# Patient Record
Sex: FEMALE | Race: WHITE | Employment: FULL TIME | URBAN - METROPOLITAN AREA
[De-identification: names, ages, dates, MRNs, and addresses within clinical notes are randomized per-mention and may not be internally consistent; named-entity substitution may affect disease eponyms.]

---

## 2017-01-13 ENCOUNTER — OFFICE VISIT (OUTPATIENT)
Dept: OBGYN CLINIC | Facility: CLINIC | Age: 32
End: 2017-01-13

## 2017-01-13 VITALS
SYSTOLIC BLOOD PRESSURE: 113 MMHG | BODY MASS INDEX: 41 KG/M2 | DIASTOLIC BLOOD PRESSURE: 79 MMHG | HEART RATE: 87 BPM | WEIGHT: 277 LBS

## 2017-01-13 DIAGNOSIS — K60.0 ACUTE ANAL FISSURE: Primary | ICD-10-CM

## 2017-01-13 PROCEDURE — 99213 OFFICE O/P EST LOW 20 MIN: CPT | Performed by: OBSTETRICS & GYNECOLOGY

## 2017-01-13 RX ORDER — CEPHALEXIN 500 MG/1
500 CAPSULE ORAL 4 TIMES DAILY
Qty: 28 CAPSULE | Refills: 0 | Status: SHIPPED | OUTPATIENT
Start: 2017-01-13 | End: 2017-06-02 | Stop reason: ALTCHOICE

## 2017-01-13 NOTE — PROGRESS NOTES
HPI:    Patient ID: Russel Paget is a 27year old female. HPI  Patient had a very hard BM about a week ago and perineal body has been sore and progressively getting worse. H/O Bartholin abscess but symptoms are not the same per patient.     Review of S Meds This Visit:  Signed Prescriptions Disp Refills    cephALEXin (KEFLEX) 500 MG Oral Cap 28 capsule 0      Sig: Take 1 capsule (500 mg total) by mouth 4 (four) times daily.            Imaging & Referrals:  None       CT#[id number]

## 2017-01-16 ENCOUNTER — TELEPHONE (OUTPATIENT)
Dept: OBGYN CLINIC | Facility: CLINIC | Age: 32
End: 2017-01-16

## 2017-01-16 NOTE — TELEPHONE ENCOUNTER
Pt states that she was given note from [facility] for her employer but she will like it updated to say that she can return to work on Wednesday 01/18/17. Please advise.

## 2017-01-20 ENCOUNTER — NURSE ONLY (OUTPATIENT)
Dept: OBGYN CLINIC | Facility: CLINIC | Age: 32
End: 2017-01-20

## 2017-01-20 VITALS — SYSTOLIC BLOOD PRESSURE: 117 MMHG | HEART RATE: 84 BPM | DIASTOLIC BLOOD PRESSURE: 74 MMHG

## 2017-01-20 DIAGNOSIS — Z30.42 ENCOUNTER FOR DEPO-PROVERA CONTRACEPTION: Primary | ICD-10-CM

## 2017-01-20 PROCEDURE — 96372 THER/PROPH/DIAG INJ SC/IM: CPT | Performed by: OBSTETRICS & GYNECOLOGY

## 2017-01-20 RX ORDER — MEDROXYPROGESTERONE ACETATE 150 MG/ML
150 INJECTION, SUSPENSION INTRAMUSCULAR ONCE
Status: COMPLETED | OUTPATIENT
Start: 2017-01-20 | End: 2017-01-20

## 2017-01-20 RX ADMIN — MEDROXYPROGESTERONE ACETATE 150 MG: 150 INJECTION, SUSPENSION INTRAMUSCULAR at 11:42:00

## 2017-04-07 ENCOUNTER — NURSE ONLY (OUTPATIENT)
Dept: OBGYN CLINIC | Facility: CLINIC | Age: 32
End: 2017-04-07

## 2017-04-07 VITALS — SYSTOLIC BLOOD PRESSURE: 100 MMHG | DIASTOLIC BLOOD PRESSURE: 60 MMHG

## 2017-04-07 DIAGNOSIS — Z30.42 ENCOUNTER FOR SURVEILLANCE OF INJECTABLE CONTRACEPTIVE: Primary | ICD-10-CM

## 2017-04-07 PROCEDURE — 81025 URINE PREGNANCY TEST: CPT | Performed by: OBSTETRICS & GYNECOLOGY

## 2017-04-07 PROCEDURE — 96372 THER/PROPH/DIAG INJ SC/IM: CPT | Performed by: OBSTETRICS & GYNECOLOGY

## 2017-04-07 RX ORDER — MEDROXYPROGESTERONE ACETATE 150 MG/ML
150 INJECTION, SUSPENSION INTRAMUSCULAR ONCE
Status: COMPLETED | OUTPATIENT
Start: 2017-04-07 | End: 2017-04-07

## 2017-04-07 RX ADMIN — MEDROXYPROGESTERONE ACETATE 150 MG: 150 INJECTION, SUSPENSION INTRAMUSCULAR at 10:44:00

## 2017-06-02 ENCOUNTER — OFFICE VISIT (OUTPATIENT)
Dept: INTERNAL MEDICINE CLINIC | Facility: CLINIC | Age: 32
End: 2017-06-02

## 2017-06-02 VITALS
TEMPERATURE: 99 F | BODY MASS INDEX: 42.06 KG/M2 | WEIGHT: 284 LBS | HEART RATE: 76 BPM | DIASTOLIC BLOOD PRESSURE: 78 MMHG | SYSTOLIC BLOOD PRESSURE: 118 MMHG | HEIGHT: 69 IN

## 2017-06-02 DIAGNOSIS — L02.416 CUTANEOUS ABSCESS OF LEFT LOWER EXTREMITY: Primary | ICD-10-CM

## 2017-06-02 PROBLEM — L02.91 ABSCESS OF SKIN: Status: ACTIVE | Noted: 2017-06-02

## 2017-06-02 PROCEDURE — 99212 OFFICE O/P EST SF 10 MIN: CPT | Performed by: INTERNAL MEDICINE

## 2017-06-02 PROCEDURE — 99213 OFFICE O/P EST LOW 20 MIN: CPT | Performed by: INTERNAL MEDICINE

## 2017-06-02 RX ORDER — SULFAMETHOXAZOLE AND TRIMETHOPRIM 800; 160 MG/1; MG/1
1 TABLET ORAL 2 TIMES DAILY
Qty: 14 TABLET | Refills: 0 | Status: SHIPPED | OUTPATIENT
Start: 2017-06-02 | End: 2017-06-09

## 2017-06-02 NOTE — PATIENT INSTRUCTIONS
Cutaneous abscess of left lower extremity  (primary encounter diagnosis) she is refusing incision, will start her on abx , warm compresses, if not better or getting worse call us      Abscess (Antibiotic Treatment Only)  An abscess (sometimes called a “boi · Fever of 100.4ºF (38ºC) or higher, or as directed by your healthcare provider  · Pus or fluid coming from the abscess  · Boil returns after getting better  Date Last Reviewed: 9/1/2016  © 1933-9478 The [facility] Jax Ray

## 2017-06-02 NOTE — PROGRESS NOTES
HPI:    Patient ID: Marj Kirk is a 27year old female. HPI She is here today complaining of boil on her left inner thigh. According to her she usually gets this between thighs all the time but this one is bigger, tender to touch and very painfull . Dicyclomine HCl 20 MG Oral Tab Take 1 tablet (20 mg total) by mouth 4 (four) times daily before meals and nightly.  Prn abdominal pain Disp: 120 tablet Rfl: 5   metoprolol Tartrate 25 MG Oral Tab Take 25 mg by mouth as needed (Patient to take if she is fe Tympanic membrane is not erythematous. Left Ear: Tympanic membrane and external ear normal. No drainage or tenderness. No mastoid tenderness. Nose: Nose normal. Right sinus exhibits no maxillary sinus tenderness and no frontal sinus tenderness.  Left si around   Psychiatric: She has a normal mood and affect. Her behavior is normal.   Vitals reviewed.            ASSESSMENT/PLAN:   Cutaneous abscess of left lower extremity  (primary encounter diagnosis) she is refusing incision, will start her on abx, and ta

## 2017-06-23 ENCOUNTER — NURSE ONLY (OUTPATIENT)
Dept: OBGYN CLINIC | Facility: CLINIC | Age: 32
End: 2017-06-23

## 2017-06-23 VITALS — SYSTOLIC BLOOD PRESSURE: 118 MMHG | DIASTOLIC BLOOD PRESSURE: 72 MMHG

## 2017-06-23 DIAGNOSIS — Z30.42 ENCOUNTER FOR DEPO-PROVERA CONTRACEPTION: Primary | ICD-10-CM

## 2017-06-23 PROCEDURE — 96372 THER/PROPH/DIAG INJ SC/IM: CPT | Performed by: OBSTETRICS & GYNECOLOGY

## 2017-06-23 RX ORDER — MEDROXYPROGESTERONE ACETATE 150 MG/ML
150 INJECTION, SUSPENSION INTRAMUSCULAR ONCE
Status: COMPLETED | OUTPATIENT
Start: 2017-06-23 | End: 2017-09-08

## 2017-08-11 ENCOUNTER — MYAURORA ACCOUNT LINK (OUTPATIENT)
Dept: OTHER | Age: 32
End: 2017-08-11

## 2017-08-11 ENCOUNTER — PRIOR ORIGINAL RECORDS (OUTPATIENT)
Dept: OTHER | Age: 32
End: 2017-08-11

## 2017-08-23 ENCOUNTER — CHARTING TRANS (OUTPATIENT)
Dept: OTHER | Age: 32
End: 2017-08-23

## 2017-09-08 ENCOUNTER — NURSE ONLY (OUTPATIENT)
Dept: OBGYN CLINIC | Facility: CLINIC | Age: 32
End: 2017-09-08

## 2017-09-08 VITALS — DIASTOLIC BLOOD PRESSURE: 70 MMHG | SYSTOLIC BLOOD PRESSURE: 104 MMHG

## 2017-09-08 DIAGNOSIS — Z30.013 INITIATION OF DEPO PROVERA: Primary | ICD-10-CM

## 2017-09-08 LAB
CONTROL LINE PRESENT WITH A CLEAR BACKGROUND (YES/NO): YES YES/NO
KIT LOT #: NORMAL NUMERIC

## 2017-09-08 PROCEDURE — 81025 URINE PREGNANCY TEST: CPT | Performed by: OBSTETRICS & GYNECOLOGY

## 2017-09-08 PROCEDURE — 96372 THER/PROPH/DIAG INJ SC/IM: CPT | Performed by: OBSTETRICS & GYNECOLOGY

## 2017-09-08 RX ADMIN — MEDROXYPROGESTERONE ACETATE 150 MG: 150 INJECTION, SUSPENSION INTRAMUSCULAR at 10:18:00

## 2017-12-01 ENCOUNTER — NURSE ONLY (OUTPATIENT)
Dept: OBGYN CLINIC | Facility: CLINIC | Age: 32
End: 2017-12-01

## 2017-12-01 DIAGNOSIS — Z30.42 ENCOUNTER FOR SURVEILLANCE OF INJECTABLE CONTRACEPTIVE: Primary | ICD-10-CM

## 2017-12-01 PROCEDURE — 96372 THER/PROPH/DIAG INJ SC/IM: CPT | Performed by: OBSTETRICS & GYNECOLOGY

## 2017-12-01 RX ORDER — MEDROXYPROGESTERONE ACETATE 150 MG/ML
150 INJECTION, SUSPENSION INTRAMUSCULAR ONCE
Status: COMPLETED | OUTPATIENT
Start: 2017-12-01 | End: 2017-12-01

## 2017-12-01 RX ADMIN — MEDROXYPROGESTERONE ACETATE 150 MG: 150 INJECTION, SUSPENSION INTRAMUSCULAR at 11:20:00

## 2018-01-11 ENCOUNTER — OFFICE VISIT (OUTPATIENT)
Dept: INTERNAL MEDICINE CLINIC | Facility: CLINIC | Age: 33
End: 2018-01-11

## 2018-01-11 VITALS
BODY MASS INDEX: 41.92 KG/M2 | HEIGHT: 69 IN | SYSTOLIC BLOOD PRESSURE: 122 MMHG | HEART RATE: 76 BPM | TEMPERATURE: 99 F | WEIGHT: 283 LBS | DIASTOLIC BLOOD PRESSURE: 80 MMHG

## 2018-01-11 DIAGNOSIS — R68.89 FLU-LIKE SYMPTOMS: Primary | ICD-10-CM

## 2018-01-11 LAB
FLUAV + FLUBV RNA SPEC NAA+PROBE: NEGATIVE

## 2018-01-11 PROCEDURE — 99212 OFFICE O/P EST SF 10 MIN: CPT | Performed by: INTERNAL MEDICINE

## 2018-01-11 PROCEDURE — 99213 OFFICE O/P EST LOW 20 MIN: CPT | Performed by: INTERNAL MEDICINE

## 2018-01-11 RX ORDER — IVERMECTIN 10 MG/G
CREAM TOPICAL
COMMUNITY
Start: 2017-12-29 | End: 2020-09-29 | Stop reason: ALTCHOICE

## 2018-01-12 NOTE — PROGRESS NOTES
HPI:    Patient ID: Terrence Lackey is a 34year old female. HPI  Patient here with complaint of some fatigue body aches scratchy throat employees at work sick with flu patient did not get flu shot.     Review of Systems   Constitutional: Positive for fati this encounter       Imaging & Referrals:  None       LY#[id number]

## 2018-02-16 ENCOUNTER — OFFICE VISIT (OUTPATIENT)
Dept: OBGYN CLINIC | Facility: CLINIC | Age: 33
End: 2018-02-16

## 2018-02-16 VITALS
HEIGHT: 69 IN | BODY MASS INDEX: 42.36 KG/M2 | DIASTOLIC BLOOD PRESSURE: 76 MMHG | SYSTOLIC BLOOD PRESSURE: 116 MMHG | WEIGHT: 286 LBS

## 2018-02-16 DIAGNOSIS — Z01.419 ENCOUNTER FOR GYNECOLOGICAL EXAMINATION WITHOUT ABNORMAL FINDING: ICD-10-CM

## 2018-02-16 DIAGNOSIS — Z30.42 DEPO-PROVERA CONTRACEPTIVE STATUS: Primary | ICD-10-CM

## 2018-02-16 LAB
CONTROL LINE PRESENT WITH A CLEAR BACKGROUND (YES/NO): YES YES/NO
KIT LOT #: NORMAL NUMERIC

## 2018-02-16 PROCEDURE — 99395 PREV VISIT EST AGE 18-39: CPT | Performed by: OBSTETRICS & GYNECOLOGY

## 2018-02-16 PROCEDURE — 81025 URINE PREGNANCY TEST: CPT | Performed by: OBSTETRICS & GYNECOLOGY

## 2018-02-16 PROCEDURE — 96372 THER/PROPH/DIAG INJ SC/IM: CPT | Performed by: OBSTETRICS & GYNECOLOGY

## 2018-02-16 RX ORDER — MEDROXYPROGESTERONE ACETATE 150 MG/ML
150 INJECTION, SUSPENSION INTRAMUSCULAR
Status: DISCONTINUED | OUTPATIENT
Start: 2018-02-16 | End: 2020-02-21

## 2018-02-16 RX ADMIN — MEDROXYPROGESTERONE ACETATE 150 MG: 150 INJECTION, SUSPENSION INTRAMUSCULAR at 15:45:00

## 2018-02-16 NOTE — PROGRESS NOTES
HPI:    Patient ID: Derek Caba is a 34year old female. HPI  Patient here for routine exam.  No C/Os. Pap due next year. Needs Depo-Provera today. Review of Systems   Constitutional: Negative. Respiratory: Negative.     Cardiovascular: Negativ ASSESSMENT/PLAN:   Depo-provera contraceptive status  (primary encounter diagnosis)  Encounter for gynecological examination without abnormal finding   F/U Labs. Encouraged monthly SBE. Discussed diet and exercise. Depo-Provera today.     Orders Placed

## 2018-04-21 ENCOUNTER — HOSPITAL ENCOUNTER (EMERGENCY)
Facility: HOSPITAL | Age: 33
Discharge: HOME | End: 2018-04-21
Attending: EMERGENCY MEDICINE
Payer: COMMERCIAL

## 2018-04-21 VITALS
HEART RATE: 82 BPM | SYSTOLIC BLOOD PRESSURE: 126 MMHG | OXYGEN SATURATION: 97 % | DIASTOLIC BLOOD PRESSURE: 78 MMHG | WEIGHT: 285 LBS | HEIGHT: 69 IN | TEMPERATURE: 99 F | BODY MASS INDEX: 42.21 KG/M2 | RESPIRATION RATE: 18 BRPM

## 2018-04-21 DIAGNOSIS — M54.40 BACK PAIN OF LUMBAR REGION WITH SCIATICA: Primary | ICD-10-CM

## 2018-04-21 PROCEDURE — 81025 URINE PREGNANCY TEST: CPT

## 2018-04-21 PROCEDURE — 96372 THER/PROPH/DIAG INJ SC/IM: CPT

## 2018-04-21 PROCEDURE — 99284 EMERGENCY DEPT VISIT MOD MDM: CPT

## 2018-04-21 RX ORDER — KETOROLAC TROMETHAMINE 30 MG/ML
30 INJECTION, SOLUTION INTRAMUSCULAR; INTRAVENOUS ONCE
Status: COMPLETED | OUTPATIENT
Start: 2018-04-21 | End: 2018-04-21

## 2018-04-21 RX ORDER — DIAZEPAM 5 MG/ML
5 INJECTION, SOLUTION INTRAMUSCULAR; INTRAVENOUS ONCE
Status: COMPLETED | OUTPATIENT
Start: 2018-04-21 | End: 2018-04-21

## 2018-04-21 RX ORDER — NAPROXEN 500 MG/1
500 TABLET ORAL 2 TIMES DAILY PRN
Qty: 20 TABLET | Refills: 0 | Status: SHIPPED | OUTPATIENT
Start: 2018-04-21 | End: 2018-04-28

## 2018-04-21 RX ORDER — HYDROCODONE BITARTRATE AND ACETAMINOPHEN 5; 325 MG/1; MG/1
1-2 TABLET ORAL EVERY 4 HOURS PRN
Qty: 10 TABLET | Refills: 0 | Status: SHIPPED | OUTPATIENT
Start: 2018-04-21 | End: 2018-04-28

## 2018-04-21 RX ORDER — DIAZEPAM 5 MG/1
5 TABLET ORAL 3 TIMES DAILY PRN
Qty: 20 TABLET | Refills: 0 | Status: SHIPPED | OUTPATIENT
Start: 2018-04-21 | End: 2018-04-28

## 2018-04-22 NOTE — ED NOTES
Patient reports 5/10 left lower back pain after moving heavy boxes yesterday. She states that she felt the pain immediately when lifting one box. Patient reports history of L5-S1 bulge. There are no other complaints.

## 2018-04-22 NOTE — ED INITIAL ASSESSMENT (HPI)
Pt c/o sharp and throbbing pain to lower back to radiates down right buttocks, started last night. Pt states she was carrying something heavy last night.

## 2018-05-07 ENCOUNTER — NURSE ONLY (OUTPATIENT)
Dept: OBGYN CLINIC | Facility: CLINIC | Age: 33
End: 2018-05-07

## 2018-05-07 VITALS — DIASTOLIC BLOOD PRESSURE: 82 MMHG | SYSTOLIC BLOOD PRESSURE: 126 MMHG

## 2018-05-07 DIAGNOSIS — Z30.9 ENCOUNTER FOR CONTRACEPTIVE MANAGEMENT, UNSPECIFIED TYPE: Primary | ICD-10-CM

## 2018-05-07 PROCEDURE — 96372 THER/PROPH/DIAG INJ SC/IM: CPT | Performed by: OBSTETRICS & GYNECOLOGY

## 2018-05-07 RX ADMIN — MEDROXYPROGESTERONE ACETATE 150 MG: 150 INJECTION, SUSPENSION INTRAMUSCULAR at 11:22:00

## 2018-07-27 ENCOUNTER — NURSE ONLY (OUTPATIENT)
Dept: OBGYN CLINIC | Facility: CLINIC | Age: 33
End: 2018-07-27
Payer: COMMERCIAL

## 2018-07-27 VITALS — SYSTOLIC BLOOD PRESSURE: 117 MMHG | DIASTOLIC BLOOD PRESSURE: 72 MMHG

## 2018-07-27 DIAGNOSIS — Z30.42 ENCOUNTER FOR SURVEILLANCE OF INJECTABLE CONTRACEPTIVE: Primary | ICD-10-CM

## 2018-07-27 LAB
CONTROL LINE PRESENT WITH A CLEAR BACKGROUND (YES/NO): YES YES/NO
KIT LOT #: NORMAL NUMERIC

## 2018-07-27 PROCEDURE — 81025 URINE PREGNANCY TEST: CPT | Performed by: OBSTETRICS & GYNECOLOGY

## 2018-07-27 PROCEDURE — 96372 THER/PROPH/DIAG INJ SC/IM: CPT | Performed by: OBSTETRICS & GYNECOLOGY

## 2018-07-27 RX ADMIN — MEDROXYPROGESTERONE ACETATE 150 MG: 150 INJECTION, SUSPENSION INTRAMUSCULAR at 11:16:00

## 2018-07-27 NOTE — PROGRESS NOTES
Pt reports to office for Depo-Provera 150 mg injection. Urine hcg negative. Pt tolerates injection well. Pt counseled to return to office 10/12-10/26 for next injection. Pt verbalizes understanding and agrees with plan.

## 2018-09-24 ENCOUNTER — HOSPITAL ENCOUNTER (OUTPATIENT)
Dept: GENERAL RADIOLOGY | Age: 33
Discharge: HOME | End: 2018-09-24
Attending: INTERNAL MEDICINE
Payer: COMMERCIAL

## 2018-09-24 ENCOUNTER — OFFICE VISIT (OUTPATIENT)
Dept: INTERNAL MEDICINE CLINIC | Facility: CLINIC | Age: 33
End: 2018-09-24
Payer: COMMERCIAL

## 2018-09-24 VITALS
TEMPERATURE: 99 F | DIASTOLIC BLOOD PRESSURE: 81 MMHG | RESPIRATION RATE: 18 BRPM | WEIGHT: 286 LBS | OXYGEN SATURATION: 97 % | BODY MASS INDEX: 42.36 KG/M2 | SYSTOLIC BLOOD PRESSURE: 114 MMHG | HEART RATE: 71 BPM | HEIGHT: 69 IN

## 2018-09-24 DIAGNOSIS — M25.561 CHRONIC PAIN OF RIGHT KNEE: ICD-10-CM

## 2018-09-24 DIAGNOSIS — Z00.00 ADULT GENERAL MEDICAL EXAM: ICD-10-CM

## 2018-09-24 DIAGNOSIS — G89.29 CHRONIC PAIN OF RIGHT KNEE: ICD-10-CM

## 2018-09-24 DIAGNOSIS — M54.32 SCIATICA OF LEFT SIDE: Primary | ICD-10-CM

## 2018-09-24 DIAGNOSIS — M54.32 SCIATICA OF LEFT SIDE: ICD-10-CM

## 2018-09-24 LAB
ALBUMIN SERPL BCP-MCNC: 4.2 G/DL (ref 3.5–4.8)
ALBUMIN/GLOB SERPL: 1.2 {RATIO} (ref 1–2)
ALP SERPL-CCNC: 47 U/L (ref 32–100)
ALT SERPL-CCNC: 25 U/L (ref 14–54)
ANION GAP SERPL CALC-SCNC: 10 MMOL/L (ref 0–18)
AST SERPL-CCNC: 26 U/L (ref 15–41)
BILIRUB SERPL-MCNC: 0.4 MG/DL (ref 0.3–1.2)
BUN SERPL-MCNC: 5 MG/DL (ref 8–20)
BUN/CREAT SERPL: 7.5 (ref 10–20)
CALCIUM SERPL-MCNC: 9.2 MG/DL (ref 8.5–10.5)
CHLORIDE SERPL-SCNC: 106 MMOL/L (ref 95–110)
CHOLEST SERPL-MCNC: 203 MG/DL (ref 110–200)
CO2 SERPL-SCNC: 22 MMOL/L (ref 22–32)
CREAT SERPL-MCNC: 0.67 MG/DL (ref 0.5–1.5)
GLOBULIN PLAS-MCNC: 3.6 G/DL (ref 2.5–3.7)
GLUCOSE SERPL-MCNC: 92 MG/DL (ref 70–99)
HDLC SERPL-MCNC: 47 MG/DL
LDLC SERPL CALC-MCNC: 122 MG/DL (ref 0–99)
NONHDLC SERPL-MCNC: 156 MG/DL
OSMOLALITY UR CALC.SUM OF ELEC: 283 MOSM/KG (ref 275–295)
PATIENT FASTING: NO
POTASSIUM SERPL-SCNC: 4.4 MMOL/L (ref 3.3–5.1)
PROT SERPL-MCNC: 7.8 G/DL (ref 5.9–8.4)
SODIUM SERPL-SCNC: 138 MMOL/L (ref 136–144)
TRIGL SERPL-MCNC: 169 MG/DL (ref 1–149)

## 2018-09-24 PROCEDURE — 96372 THER/PROPH/DIAG INJ SC/IM: CPT | Performed by: INTERNAL MEDICINE

## 2018-09-24 PROCEDURE — 72100 X-RAY EXAM L-S SPINE 2/3 VWS: CPT | Performed by: INTERNAL MEDICINE

## 2018-09-24 PROCEDURE — 73560 X-RAY EXAM OF KNEE 1 OR 2: CPT | Performed by: INTERNAL MEDICINE

## 2018-09-24 PROCEDURE — 99214 OFFICE O/P EST MOD 30 MIN: CPT | Performed by: INTERNAL MEDICINE

## 2018-09-24 PROCEDURE — 73502 X-RAY EXAM HIP UNI 2-3 VIEWS: CPT | Performed by: INTERNAL MEDICINE

## 2018-09-24 PROCEDURE — 36415 COLL VENOUS BLD VENIPUNCTURE: CPT | Performed by: INTERNAL MEDICINE

## 2018-09-24 RX ORDER — KETOROLAC TROMETHAMINE 10 MG/1
10 TABLET, FILM COATED ORAL EVERY 8 HOURS
Qty: 15 TABLET | Refills: 0 | Status: SHIPPED | OUTPATIENT
Start: 2018-09-24 | End: 2019-06-28

## 2018-09-24 RX ORDER — ACETAMINOPHEN 500 MG
500 TABLET ORAL EVERY 6 HOURS PRN
COMMUNITY

## 2018-09-24 RX ORDER — KETOROLAC TROMETHAMINE 30 MG/ML
30 INJECTION, SOLUTION INTRAMUSCULAR; INTRAVENOUS ONCE
Status: COMPLETED | OUTPATIENT
Start: 2018-09-24 | End: 2018-09-24

## 2018-09-24 RX ORDER — TIZANIDINE HYDROCHLORIDE 4 MG/1
4 CAPSULE, GELATIN COATED ORAL NIGHTLY
Qty: 5 CAPSULE | Refills: 0 | Status: SHIPPED | OUTPATIENT
Start: 2018-09-24 | End: 2019-06-28

## 2018-09-24 RX ADMIN — KETOROLAC TROMETHAMINE 30 MG: 30 INJECTION, SOLUTION INTRAMUSCULAR; INTRAVENOUS at 12:32:00

## 2018-09-24 NOTE — PATIENT INSTRUCTIONS
ASSESSMENT/PLAN:   Sciatica of left side  (primary encounter diagnosis) Careful with back. Correct lifting with legs and not with back. Turn body completely to lift something from side. Back exercises.  Correct posture when sitting with feet flat on floor a soft center may squeeze through and pinch a nerve. Pressure from bone  As a disk wears out, the vertebrae right above and below the disk start to touch. This can put pressure on a nerve.  Often, abnormal bone (called bone spurs) grows where the vertebrae seconds, then lower it back down. 3. Repeat 10 times, or as instructed. 4. Switch legs and repeat. Date Last Reviewed: 3/10/2016  © 5155-9344 The [employer]. 140[address] [facility], [address] [place] [address]. All rights reserved.  This information is (stenosis) of the openings between the vertebrae. The narrowed openings press on nerve roots as they leave the spinal canal.  · Unstable spine. This is when a vertebra slips forward. It can then press on a nerve root.   Other, less common things can put pre [place] , [place], [address] [place]. All rights reserved. This information is not intended as a substitute for professional medical care. Always follow your healthcare professional's instructions.         Self-Care for Low Back Pain    Most peopl medical care right away if:  · You can't stand or walk. · You have a temperature over 100.4°F (38.0°C)  · You have frequent, painful, or bloody urination. · You have severe abdominal pain. · You have a sharp, stabbing pain. · Your pain is constant.   · prevent a burn, keep a cloth between you and the heating pad. · Don’t use a heating pad for more than 15 minutes at a time. Never sleep on a heating pad. Date Last Reviewed: 9/1/2015  © 5614-0892 The [employer].  Alter Wall [address] push.  Date Last Reviewed: 3/1/2018  © 1702-5141 [employer]. 1407 [facility] [place], [address] [place]. All rights reserved. This information is not intended as a substitute for professional medical care.  Always follow your healthcare profes reserved. This information is not intended as a substitute for professional medical care. Always follow your healthcare professional's instructions.         Back Exercises: Abdominal Lift Brace with Marching    The abdominal lift brace with march strengthen your safety, check with your healthcare provider before starting an exercise program.   Date Last Reviewed: 3/1/2018  © 2644-6969 [employer]. 1407 [facility], [address] [place] [address]. All rights reserved.  This information is not intended as from a wall. Place one hand on it. Here are the other steps to the quad stretch:  · With your other hand, grasp your ankle on the same side. Pull the heel toward your buttocks. Don’t arch your back. · Hold for 30 to 60 seconds. Repeat 2 times.  Switch leg

## 2018-09-24 NOTE — PROGRESS NOTES
HPI:    Patient ID: Los Carrier is a 34year old female. H/O back trauma yrs. Ago and different spot helping pt. out of wheelchair. Pain meds. helped. Had PT X 2-3 months and L 5 bulging disc. Helped. Occ.  Recurs if overdo and ice and stretches and d Exercise level: somewhat active and has been following low salt diet. Weight has been stable.   Wt Readings from Last 3 Encounters:  09/24/18 : 286 lb (129.7 kg)  04/21/18 : 285 lb (129.3 kg)  02/16/18 : 286 lb (129.7 kg)    BP Readings from Last 3 Encou acetaminophen 500 MG Oral Tab Take 500 mg by mouth every 6 (six) hours as needed for Pain. Disp:  Rfl:    TiZANidine HCl 4 MG Oral Cap Take 1 capsule (4 mg total) by mouth nightly.  Disp: 5 capsule Rfl: 0   Ketorolac Tromethamine 10 MG Oral Tab Take 1 table Constitutional: She is oriented to person, place, and time. She appears well-developed and well-nourished. No distress. Neck: Trachea normal and phonation normal. No thyroid mass and no thyromegaly present.    Cardiovascular: Normal rate, regular rhythm, Left ankle: She exhibits normal range of motion, no swelling, no ecchymosis, no deformity, no laceration and normal pulse. No tenderness. Achilles tendon normal. Achilles tendon exhibits no pain, no defect and normal Robertson's test results.         Leighann Cantor • TiZANidine HCl 4 MG Oral Cap 5 capsule 0     Sig: Take 1 capsule (4 mg total) by mouth nightly. • Ketorolac Tromethamine 10 MG Oral Tab 15 tablet 0     Sig: Take 1 tablet (10 mg total) by mouth every 8 (eight) hours.        Imaging & Referrals:  XR KNEE

## 2018-09-25 PROBLEM — L02.91 ABSCESS OF SKIN: Status: RESOLVED | Noted: 2017-06-02 | Resolved: 2018-09-25

## 2018-10-12 ENCOUNTER — NURSE ONLY (OUTPATIENT)
Dept: OBGYN CLINIC | Facility: CLINIC | Age: 33
End: 2018-10-12
Payer: COMMERCIAL

## 2018-10-12 VITALS — DIASTOLIC BLOOD PRESSURE: 72 MMHG | SYSTOLIC BLOOD PRESSURE: 112 MMHG

## 2018-10-12 DIAGNOSIS — Z30.42 DEPO-PROVERA CONTRACEPTIVE STATUS: ICD-10-CM

## 2018-10-12 DIAGNOSIS — Z30.42 ENCOUNTER FOR SURVEILLANCE OF INJECTABLE CONTRACEPTIVE: Primary | ICD-10-CM

## 2018-10-12 PROCEDURE — 96372 THER/PROPH/DIAG INJ SC/IM: CPT | Performed by: OBSTETRICS & GYNECOLOGY

## 2018-10-12 RX ORDER — MEDROXYPROGESTERONE ACETATE 150 MG/ML
150 INJECTION, SUSPENSION INTRAMUSCULAR ONCE
Status: COMPLETED | OUTPATIENT
Start: 2018-10-12 | End: 2018-10-12

## 2018-10-12 RX ADMIN — MEDROXYPROGESTERONE ACETATE 150 MG: 150 INJECTION, SUSPENSION INTRAMUSCULAR at 10:15:00

## 2018-10-12 NOTE — PROGRESS NOTES
Pt here today for depo contraception injection. Pt tolerated inj well. No SE noted. Pt to come back between [id number]. Verbalized understanding.

## 2018-11-03 VITALS
BODY MASS INDEX: 39.99 KG/M2 | HEART RATE: 71 BPM | RESPIRATION RATE: 16 BRPM | HEIGHT: 69 IN | DIASTOLIC BLOOD PRESSURE: 72 MMHG | WEIGHT: 270 LBS | SYSTOLIC BLOOD PRESSURE: 110 MMHG | TEMPERATURE: 98.1 F

## 2019-01-02 ENCOUNTER — WALK IN (OUTPATIENT)
Dept: URGENT CARE | Age: 34
End: 2019-01-02

## 2019-01-02 VITALS
OXYGEN SATURATION: 96 % | HEIGHT: 69 IN | TEMPERATURE: 98.7 F | HEART RATE: 82 BPM | WEIGHT: 260 LBS | BODY MASS INDEX: 38.51 KG/M2 | SYSTOLIC BLOOD PRESSURE: 100 MMHG | DIASTOLIC BLOOD PRESSURE: 78 MMHG | RESPIRATION RATE: 16 BRPM

## 2019-01-02 DIAGNOSIS — J20.9 ACUTE BRONCHITIS, UNSPECIFIED ORGANISM: Primary | ICD-10-CM

## 2019-01-02 PROCEDURE — 99204 OFFICE O/P NEW MOD 45 MIN: CPT | Performed by: NURSE PRACTITIONER

## 2019-01-02 RX ORDER — ALBUTEROL SULFATE 90 UG/1
2 AEROSOL, METERED RESPIRATORY (INHALATION) EVERY 4 HOURS PRN
Qty: 1 INHALER | Refills: 0 | Status: SHIPPED | OUTPATIENT
Start: 2019-01-02 | End: 2019-01-09

## 2019-01-02 RX ORDER — BENZONATATE 100 MG/1
100 CAPSULE ORAL 3 TIMES DAILY PRN
Qty: 30 CAPSULE | Refills: 0 | Status: SHIPPED | OUTPATIENT
Start: 2019-01-02 | End: 2019-01-12

## 2019-01-02 ASSESSMENT — ENCOUNTER SYMPTOMS
SHORTNESS OF BREATH: 1
FEVER: 0
WHEEZING: 1
HEADACHES: 1
RHINORRHEA: 0
EYES NEGATIVE: 1
SORE THROAT: 0
ALLERGIC/IMMUNOLOGIC NEGATIVE: 1
COUGH: 1
GASTROINTESTINAL NEGATIVE: 1
ENDOCRINE NEGATIVE: 1
PSYCHIATRIC NEGATIVE: 1
HEMATOLOGIC/LYMPHATIC NEGATIVE: 1

## 2019-01-11 ENCOUNTER — NURSE ONLY (OUTPATIENT)
Dept: OBGYN CLINIC | Facility: CLINIC | Age: 34
End: 2019-01-11
Payer: COMMERCIAL

## 2019-01-11 VITALS — SYSTOLIC BLOOD PRESSURE: 120 MMHG | DIASTOLIC BLOOD PRESSURE: 82 MMHG

## 2019-01-11 DIAGNOSIS — Z30.42 ENCOUNTER FOR DEPO-PROVERA CONTRACEPTION: Primary | ICD-10-CM

## 2019-01-11 PROCEDURE — 96372 THER/PROPH/DIAG INJ SC/IM: CPT | Performed by: OBSTETRICS & GYNECOLOGY

## 2019-01-11 RX ADMIN — MEDROXYPROGESTERONE ACETATE 150 MG: 150 INJECTION, SUSPENSION INTRAMUSCULAR at 11:07:00

## 2019-02-28 VITALS
DIASTOLIC BLOOD PRESSURE: 60 MMHG | BODY MASS INDEX: 39.99 KG/M2 | HEIGHT: 69 IN | HEART RATE: 98 BPM | SYSTOLIC BLOOD PRESSURE: 100 MMHG | WEIGHT: 270 LBS

## 2019-04-08 ENCOUNTER — NURSE ONLY (OUTPATIENT)
Dept: OBGYN CLINIC | Facility: CLINIC | Age: 34
End: 2019-04-08
Payer: COMMERCIAL

## 2019-04-08 VITALS — SYSTOLIC BLOOD PRESSURE: 110 MMHG | DIASTOLIC BLOOD PRESSURE: 70 MMHG

## 2019-04-08 DIAGNOSIS — Z30.42 ENCOUNTER FOR SURVEILLANCE OF INJECTABLE CONTRACEPTIVE: Primary | ICD-10-CM

## 2019-04-08 PROCEDURE — 96372 THER/PROPH/DIAG INJ SC/IM: CPT | Performed by: OBSTETRICS & GYNECOLOGY

## 2019-04-08 RX ORDER — MEDROXYPROGESTERONE ACETATE 150 MG/ML
150 INJECTION, SUSPENSION INTRAMUSCULAR ONCE
Status: COMPLETED | OUTPATIENT
Start: 2019-04-08 | End: 2019-04-08

## 2019-04-08 RX ADMIN — MEDROXYPROGESTERONE ACETATE 150 MG: 150 INJECTION, SUSPENSION INTRAMUSCULAR at 11:38:00

## 2019-06-28 ENCOUNTER — OFFICE VISIT (OUTPATIENT)
Dept: OBGYN CLINIC | Facility: CLINIC | Age: 34
End: 2019-06-28
Payer: COMMERCIAL

## 2019-06-28 VITALS
WEIGHT: 282 LBS | BODY MASS INDEX: 42 KG/M2 | HEART RATE: 69 BPM | SYSTOLIC BLOOD PRESSURE: 111 MMHG | DIASTOLIC BLOOD PRESSURE: 74 MMHG

## 2019-06-28 DIAGNOSIS — Z01.419 WOMEN'S ANNUAL ROUTINE GYNECOLOGICAL EXAMINATION: Primary | ICD-10-CM

## 2019-06-28 PROCEDURE — 99395 PREV VISIT EST AGE 18-39: CPT | Performed by: OBSTETRICS & GYNECOLOGY

## 2019-06-28 PROCEDURE — 96372 THER/PROPH/DIAG INJ SC/IM: CPT | Performed by: OBSTETRICS & GYNECOLOGY

## 2019-06-28 RX ADMIN — MEDROXYPROGESTERONE ACETATE 150 MG: 150 INJECTION, SUSPENSION INTRAMUSCULAR at 11:33:00

## 2019-06-28 NOTE — PROGRESS NOTES
HPI:    Patient ID: Blu Kaba is a 28year old female. HPI  Well woman visit  New patient to me  For routine annual and to continue on her Depo-Provera. States that she has been on it for 13 years and has no issues or concerns.   Counseled on potent • Renal Disease Paternal Grandfather    • Diabetes Paternal Grandfather    • Hypertension Mother    • Cancer Maternal Grandfather         stomach   • Breast Cancer Maternal Aunt    • Colon Cancer Paternal Aunt       Social History: Social History    [facility] exam was performed with patient supine and chaperone present. External genitalia- normal.  Bartholin's and Washita's glands normal.  Urethral meatus- without lesions, mass, or discharge. Urethra- normal without lesion, cyst, mass, or tenderness.   Vulva- no

## 2019-09-13 ENCOUNTER — NURSE ONLY (OUTPATIENT)
Dept: OBGYN CLINIC | Facility: CLINIC | Age: 34
End: 2019-09-13
Payer: COMMERCIAL

## 2019-09-13 VITALS — SYSTOLIC BLOOD PRESSURE: 110 MMHG | DIASTOLIC BLOOD PRESSURE: 62 MMHG

## 2019-09-13 DIAGNOSIS — Z30.42 ENCOUNTER FOR SURVEILLANCE OF INJECTABLE CONTRACEPTIVE: Primary | ICD-10-CM

## 2019-09-13 PROCEDURE — 96372 THER/PROPH/DIAG INJ SC/IM: CPT | Performed by: OBSTETRICS & GYNECOLOGY

## 2019-09-13 RX ORDER — MEDROXYPROGESTERONE ACETATE 150 MG/ML
150 INJECTION, SUSPENSION INTRAMUSCULAR ONCE
Status: COMPLETED | OUTPATIENT
Start: 2019-09-13 | End: 2019-09-13

## 2019-09-13 RX ADMIN — MEDROXYPROGESTERONE ACETATE 150 MG: 150 INJECTION, SUSPENSION INTRAMUSCULAR at 11:35:00

## 2019-09-13 NOTE — PROGRESS NOTES
Patient had Depo provera on left Arm. Patient tolerated injection well. No adverse reaction. Patient was given dates when to come back for next depo. Patient verbally understood.

## 2019-09-18 ENCOUNTER — HOSPITAL ENCOUNTER (EMERGENCY)
Facility: HOSPITAL | Age: 34
Discharge: HOME | End: 2019-09-19
Attending: EMERGENCY MEDICINE
Payer: COMMERCIAL

## 2019-09-18 ENCOUNTER — APPOINTMENT (OUTPATIENT)
Dept: ULTRASOUND IMAGING | Facility: HOSPITAL | Age: 34
End: 2019-09-18
Attending: EMERGENCY MEDICINE
Payer: COMMERCIAL

## 2019-09-18 ENCOUNTER — APPOINTMENT (OUTPATIENT)
Dept: CT IMAGING | Facility: HOSPITAL | Age: 34
End: 2019-09-18
Attending: EMERGENCY MEDICINE
Payer: COMMERCIAL

## 2019-09-18 DIAGNOSIS — N83.202 CYST OF LEFT OVARY: Primary | ICD-10-CM

## 2019-09-18 DIAGNOSIS — K59.00 CONSTIPATION, UNSPECIFIED CONSTIPATION TYPE: ICD-10-CM

## 2019-09-18 LAB
ALBUMIN SERPL-MCNC: 4.1 G/DL (ref 3.4–5)
ALP LIVER SERPL-CCNC: 72 U/L (ref 37–98)
ALT SERPL-CCNC: 23 U/L (ref 13–56)
ANION GAP SERPL CALC-SCNC: 6 MMOL/L (ref 0–18)
AST SERPL-CCNC: 14 U/L (ref 15–37)
B-HCG UR QL: NEGATIVE
BASOPHILS # BLD AUTO: 0.04 X10(3) UL (ref 0–0.2)
BASOPHILS NFR BLD AUTO: 0.4 %
BILIRUB DIRECT SERPL-MCNC: 0.1 MG/DL (ref 0–0.2)
BILIRUB SERPL-MCNC: 0.3 MG/DL (ref 0.1–2)
BILIRUB UR QL: NEGATIVE
BUN BLD-MCNC: 10 MG/DL (ref 7–18)
BUN/CREAT SERPL: 13.3 (ref 10–20)
CALCIUM BLD-MCNC: 9 MG/DL (ref 8.5–10.1)
CHLORIDE SERPL-SCNC: 108 MMOL/L (ref 98–112)
CLARITY UR: CLEAR
CO2 SERPL-SCNC: 26 MMOL/L (ref 21–32)
COLOR UR: YELLOW
CREAT BLD-MCNC: 0.75 MG/DL (ref 0.55–1.02)
DEPRECATED RDW RBC AUTO: 47 FL (ref 35.1–46.3)
EOSINOPHIL # BLD AUTO: 0.11 X10(3) UL (ref 0–0.7)
EOSINOPHIL NFR BLD AUTO: 1 %
ERYTHROCYTE [DISTWIDTH] IN BLOOD BY AUTOMATED COUNT: 14.4 % (ref 11–15)
GLUCOSE BLD-MCNC: 97 MG/DL (ref 70–99)
GLUCOSE UR-MCNC: NEGATIVE MG/DL
HCT VFR BLD AUTO: 43.4 % (ref 35–48)
HGB BLD-MCNC: 13.8 G/DL (ref 12–16)
IMM GRANULOCYTES # BLD AUTO: 0.03 X10(3) UL (ref 0–1)
IMM GRANULOCYTES NFR BLD: 0.3 %
KETONES UR-MCNC: NEGATIVE MG/DL
LIPASE SERPL-CCNC: 101 U/L (ref 73–393)
LYMPHOCYTES # BLD AUTO: 2.87 X10(3) UL (ref 1–4)
LYMPHOCYTES NFR BLD AUTO: 27 %
M PROTEIN MFR SERPL ELPH: 8.5 G/DL (ref 6.4–8.2)
MCH RBC QN AUTO: 28.6 PG (ref 26–34)
MCHC RBC AUTO-ENTMCNC: 31.8 G/DL (ref 31–37)
MCV RBC AUTO: 90 FL (ref 80–100)
MONOCYTES # BLD AUTO: 0.8 X10(3) UL (ref 0.1–1)
MONOCYTES NFR BLD AUTO: 7.5 %
NEUTROPHILS # BLD AUTO: 6.77 X10 (3) UL (ref 1.5–7.7)
NEUTROPHILS # BLD AUTO: 6.77 X10(3) UL (ref 1.5–7.7)
NEUTROPHILS NFR BLD AUTO: 63.8 %
NITRITE UR QL STRIP.AUTO: NEGATIVE
OSMOLALITY SERPL CALC.SUM OF ELEC: 289 MOSM/KG (ref 275–295)
PH UR: 5 [PH] (ref 5–8)
PLATELET # BLD AUTO: 309 10(3)UL (ref 150–450)
POTASSIUM SERPL-SCNC: 3.9 MMOL/L (ref 3.5–5.1)
PROT UR-MCNC: NEGATIVE MG/DL
RBC # BLD AUTO: 4.82 X10(6)UL (ref 3.8–5.3)
RBC #/AREA URNS AUTO: 2 /HPF
SODIUM SERPL-SCNC: 140 MMOL/L (ref 136–145)
SP GR UR STRIP: 1.02 (ref 1–1.03)
UROBILINOGEN UR STRIP-ACNC: <2
WBC # BLD AUTO: 10.6 X10(3) UL (ref 4–11)
WBC #/AREA URNS AUTO: 3 /HPF

## 2019-09-18 PROCEDURE — 81025 URINE PREGNANCY TEST: CPT

## 2019-09-18 PROCEDURE — 80076 HEPATIC FUNCTION PANEL: CPT | Performed by: EMERGENCY MEDICINE

## 2019-09-18 PROCEDURE — 83690 ASSAY OF LIPASE: CPT | Performed by: EMERGENCY MEDICINE

## 2019-09-18 PROCEDURE — 81001 URINALYSIS AUTO W/SCOPE: CPT

## 2019-09-18 PROCEDURE — 76856 US EXAM PELVIC COMPLETE: CPT | Performed by: EMERGENCY MEDICINE

## 2019-09-18 PROCEDURE — 80048 BASIC METABOLIC PNL TOTAL CA: CPT | Performed by: EMERGENCY MEDICINE

## 2019-09-18 PROCEDURE — 81001 URINALYSIS AUTO W/SCOPE: CPT | Performed by: EMERGENCY MEDICINE

## 2019-09-18 PROCEDURE — 74177 CT ABD & PELVIS W/CONTRAST: CPT | Performed by: EMERGENCY MEDICINE

## 2019-09-18 PROCEDURE — 80048 BASIC METABOLIC PNL TOTAL CA: CPT

## 2019-09-18 PROCEDURE — 85025 COMPLETE CBC W/AUTO DIFF WBC: CPT

## 2019-09-18 PROCEDURE — 76830 TRANSVAGINAL US NON-OB: CPT | Performed by: EMERGENCY MEDICINE

## 2019-09-18 PROCEDURE — 85025 COMPLETE CBC W/AUTO DIFF WBC: CPT | Performed by: EMERGENCY MEDICINE

## 2019-09-18 PROCEDURE — 96374 THER/PROPH/DIAG INJ IV PUSH: CPT

## 2019-09-18 PROCEDURE — 99284 EMERGENCY DEPT VISIT MOD MDM: CPT

## 2019-09-18 PROCEDURE — 93975 VASCULAR STUDY: CPT | Performed by: EMERGENCY MEDICINE

## 2019-09-18 RX ORDER — MORPHINE SULFATE 4 MG/ML
4 INJECTION, SOLUTION INTRAMUSCULAR; INTRAVENOUS ONCE
Status: COMPLETED | OUTPATIENT
Start: 2019-09-18 | End: 2019-09-18

## 2019-09-19 ENCOUNTER — TELEPHONE (OUTPATIENT)
Dept: OBGYN CLINIC | Facility: CLINIC | Age: 34
End: 2019-09-19

## 2019-09-19 VITALS
WEIGHT: 270 LBS | HEIGHT: 69 IN | RESPIRATION RATE: 18 BRPM | HEART RATE: 68 BPM | SYSTOLIC BLOOD PRESSURE: 128 MMHG | TEMPERATURE: 98 F | BODY MASS INDEX: 39.99 KG/M2 | DIASTOLIC BLOOD PRESSURE: 70 MMHG | OXYGEN SATURATION: 98 %

## 2019-09-19 RX ORDER — POLYETHYLENE GLYCOL 3350 17 G/17G
17 POWDER, FOR SOLUTION ORAL DAILY PRN
Qty: 12 EACH | Refills: 0 | Status: SHIPPED | OUTPATIENT
Start: 2019-09-19 | End: 2019-10-19

## 2019-09-19 NOTE — TELEPHONE ENCOUNTER
RN routing to [facility] for consideration as pt last seen by him on 6/28/19 for annual .    Please advise.

## 2019-09-19 NOTE — TELEPHONE ENCOUNTER
Pt was seen in the ER for abdominal pain yesterday, was told she had ruptured cyst. Pt requesting a note to excuse her from work today and tomorrow.  Please advise

## 2019-09-19 NOTE — TELEPHONE ENCOUNTER
MD Yariel Gramajo Naima, RN; JASWINDER Doty [facility] Ob/Gyne Clinical Staff   Caller: Unspecified (Today,  9:19 AM)             Yes, she may have a note to take off work today. If her pain persists or gets more severe, she can come in to see me.

## 2019-09-19 NOTE — TELEPHONE ENCOUNTER
Letter drafted and sent to patient via [facility]. RN placed call to patient and advised that letter sent and patient can return call to office if pain persists or worsens. Pt verbalizes understanding and agrees with plan.

## 2019-09-19 NOTE — ED PROVIDER NOTES
Patient Seen in: [facility] AND [facility] Emergency Department      History   Patient presents with:  Abdomen/Flank Pain (GI/)    Stated Complaint: abdominal pain     HPI    14-year-old female with history of Bartholin cyst presenting for evaluation of lower 122.5 kg   SpO2 98%   BMI 39.87 kg/m²         Physical Exam   Constitutional: She appears well-developed and well-nourished. HENT:   Head: Normocephalic and atraumatic. Eyes: EOM are normal.   Neck: Normal range of motion.    Cardiovascular: Normal rate (er)    Result Date: 9/18/2019  CONCLUSION:  1. Moderate stool throughout the colon consistent with constipation/fecal retention. 2. Dominant left adnexal cyst measures 3.6 x 2.4 cm, presumed physiologic.   Pelvic ultrasound would be more sensitive in evalu the patient's pain. Recommended following up with gynecology to discuss possible treatments. In addition ED return precautions given regarding torsion. Patient very agreeable with this plan.         Disposition and Plan     Clinical Impression:  Cyst of

## 2019-10-03 ENCOUNTER — TELEPHONE (OUTPATIENT)
Dept: OBGYN CLINIC | Facility: CLINIC | Age: 34
End: 2019-10-03

## 2019-10-03 ENCOUNTER — LAB ENCOUNTER (OUTPATIENT)
Dept: LAB | Facility: HOSPITAL | Age: 34
End: 2019-10-03
Attending: OBSTETRICS & GYNECOLOGY
Payer: COMMERCIAL

## 2019-10-03 DIAGNOSIS — N92.6 IRREGULAR MENSES: Primary | ICD-10-CM

## 2019-10-03 DIAGNOSIS — N92.6 IRREGULAR MENSES: ICD-10-CM

## 2019-10-03 PROCEDURE — 85025 COMPLETE CBC W/AUTO DIFF WBC: CPT

## 2019-10-03 PROCEDURE — 36415 COLL VENOUS BLD VENIPUNCTURE: CPT

## 2019-10-03 NOTE — TELEPHONE ENCOUNTER
Pt had depo shot received 9/13. Pt is now getting her menses  Normal she don't . Pt also had ovarian cyst pop , and now has period again ,.  Has some questions ,

## 2019-10-03 NOTE — TELEPHONE ENCOUNTER
RN returned call to patient who endorses has been amenorrheic on depo but had menses on  8/30-9/6, had depo injection on 9/13. Was seen in ED on 9/18 for ruptured left ovarian cyst.  Has had bleeding since 9/27 that is period like, bright red to pink.   No

## 2019-10-03 NOTE — TELEPHONE ENCOUNTER
These episodes can be normal while on Depo-Provera but I recommend going to the lab for a CBC to see if her fatigue is due to anemia. Diagnosis can be irregular menses.

## 2019-11-29 ENCOUNTER — NURSE ONLY (OUTPATIENT)
Dept: OBGYN CLINIC | Facility: CLINIC | Age: 34
End: 2019-11-29
Payer: COMMERCIAL

## 2019-11-29 VITALS — DIASTOLIC BLOOD PRESSURE: 80 MMHG | SYSTOLIC BLOOD PRESSURE: 120 MMHG

## 2019-11-29 PROCEDURE — 96372 THER/PROPH/DIAG INJ SC/IM: CPT | Performed by: OBSTETRICS & GYNECOLOGY

## 2019-11-29 RX ADMIN — MEDROXYPROGESTERONE ACETATE 150 MG: 150 INJECTION, SUSPENSION INTRAMUSCULAR at 11:30:00

## 2019-11-29 NOTE — PROGRESS NOTES
Depo provera 150 mg IM administered to the pt in the right deltoid. Pt tolerated well. No adverse effects. Pt tolerated well.

## 2019-12-05 ENCOUNTER — WALK IN (OUTPATIENT)
Dept: URGENT CARE | Age: 34
End: 2019-12-05

## 2019-12-05 VITALS
HEIGHT: 69 IN | OXYGEN SATURATION: 96 % | SYSTOLIC BLOOD PRESSURE: 110 MMHG | TEMPERATURE: 99 F | DIASTOLIC BLOOD PRESSURE: 80 MMHG | HEART RATE: 89 BPM | WEIGHT: 270 LBS | BODY MASS INDEX: 39.99 KG/M2 | RESPIRATION RATE: 16 BRPM

## 2019-12-05 DIAGNOSIS — J02.0 STREP PHARYNGITIS: Primary | ICD-10-CM

## 2019-12-05 LAB
INTERNAL PROCEDURAL CONTROLS ACCEPTABLE: YES
S PYO AG THROAT QL IA.RAPID: POSITIVE

## 2019-12-05 PROCEDURE — 99214 OFFICE O/P EST MOD 30 MIN: CPT | Performed by: NURSE PRACTITIONER

## 2019-12-05 PROCEDURE — 87880 STREP A ASSAY W/OPTIC: CPT | Performed by: NURSE PRACTITIONER

## 2019-12-05 RX ORDER — MEDROXYPROGESTERONE ACETATE 150 MG/ML
INJECTION, SUSPENSION INTRAMUSCULAR
COMMUNITY

## 2019-12-05 RX ORDER — AMOXICILLIN 500 MG/1
500 CAPSULE ORAL 2 TIMES DAILY
Qty: 20 CAPSULE | Refills: 0 | Status: SHIPPED | OUTPATIENT
Start: 2019-12-05 | End: 2019-12-15

## 2019-12-05 ASSESSMENT — ENCOUNTER SYMPTOMS
HEMATOLOGIC/LYMPHATIC NEGATIVE: 1
FATIGUE: 1
EYES NEGATIVE: 1
HEADACHES: 1
SINUS PRESSURE: 1
FEVER: 1
COUGH: 1
PSYCHIATRIC NEGATIVE: 1
SORE THROAT: 1
SINUS PAIN: 1
CHILLS: 1
ALLERGIC/IMMUNOLOGIC NEGATIVE: 1
ENDOCRINE NEGATIVE: 1
GASTROINTESTINAL NEGATIVE: 1
SHORTNESS OF BREATH: 0

## 2019-12-17 NOTE — ED PROVIDER NOTES
Patient Seen in: [facility] AND [facility] Emergency Department    History   Patient presents with:  Back Pain (musculoskeletal)    Stated Complaint: lower back pain    HPI    30-year-old female with history of obesity and IBS presents with complaints of low radha Patient was seen on 11/20.  He was given Monodonox and tessalon perles on 11/20 and Levaquin on 12/3.  Reports last dose was last Thursday.   States that his symptoms are 80% better, but still having cough/congestion/wheezing.   Patient is only taking honey at this time, no other medications.     Patient is looking for inhaler to help with symptoms   Please advise on further recommendations or office visit is needed.  Thank you.      138/73  Pulse: 81  Resp: 20  Temp: 98.5 °F (36.9 °C)  Temp src: Temporal  SpO2: 96 %  O2 Device: None (Room air)    Current:/78   Pulse 82   Temp 98.5 °F (36.9 °C) (Temporal)   Resp 18   Ht 175.3 cm (5' 9\")   Wt 129.3 kg   SpO2 97%   BMI 42.09 kg/m² (two) times daily as needed. Qty: 20 tablet Refills: 0    diazepam 5 MG Oral Tab  Take 1 tablet (5 mg total) by mouth 3 (three) times daily as needed (pain/spasm).   Qty: 20 tablet Refills: 0    HYDROcodone-acetaminophen 5-325 MG Oral Tab  Take 1-2 tablets

## 2019-12-23 NOTE — TELEPHONE ENCOUNTER
"Anesthesia Transfer of Care Note    Patient: Ede Parham    Procedure(s) Performed: Procedure(s) (LRB):  COLONOSCOPY/EMR (N/A)    Patient location: [facility]    Anesthesia Type: general    Transport from OR: Transported from OR on 2-3 L/min O2 by NC with adequate spontaneous ventilation    Post pain: adequate analgesia    Post assessment: no apparent anesthetic complications and tolerated procedure well    Post vital signs: stable    Level of consciousness: awake, alert and oriented    Nausea/Vomiting: no nausea/vomiting    Complications: none    Transfer of care protocol was followed      Last vitals:   Visit Vitals  /84   Pulse 89   Temp 36.8 °C (98.2 °F) (Temporal)   Resp 18   Ht 5' 7" (1.702 m)   Wt 74.8 kg (165 lb)   SpO2 99%   BMI 25.84 kg/m²     " Need you re approval

## 2020-02-21 ENCOUNTER — NURSE ONLY (OUTPATIENT)
Dept: OBGYN CLINIC | Facility: CLINIC | Age: 35
End: 2020-02-21
Payer: COMMERCIAL

## 2020-02-21 VITALS — DIASTOLIC BLOOD PRESSURE: 68 MMHG | SYSTOLIC BLOOD PRESSURE: 116 MMHG

## 2020-02-21 DIAGNOSIS — Z30.42 ENCOUNTER FOR SURVEILLANCE OF INJECTABLE CONTRACEPTIVE: Primary | ICD-10-CM

## 2020-02-21 PROCEDURE — 96372 THER/PROPH/DIAG INJ SC/IM: CPT | Performed by: OBSTETRICS & GYNECOLOGY

## 2020-02-21 RX ORDER — MEDROXYPROGESTERONE ACETATE 150 MG/ML
150 INJECTION, SUSPENSION INTRAMUSCULAR
Status: SHIPPED | OUTPATIENT
Start: 2020-02-21 | End: 2020-11-17

## 2020-02-21 RX ADMIN — MEDROXYPROGESTERONE ACETATE 150 MG: 150 INJECTION, SUSPENSION INTRAMUSCULAR at 11:32:00

## 2020-02-21 NOTE — PROGRESS NOTES
Pt of   here for on-time Depo 150 injection. Vitals WNL. Injection well tolerated. No adverse reaction noted.       Last annual: 06/28/2019  Last depo injection: 11/29/19      Pt advised to return for next injection: May 9- May 23

## 2020-03-18 ENCOUNTER — NURSE TRIAGE (OUTPATIENT)
Dept: INTERNAL MEDICINE CLINIC | Facility: CLINIC | Age: 35
End: 2020-03-18

## 2020-03-18 DIAGNOSIS — R06.7 SNEEZE: ICD-10-CM

## 2020-03-18 PROCEDURE — 99442 PHONE E/M BY PHYS 11-20 MIN: CPT | Performed by: INTERNAL MEDICINE

## 2020-03-18 NOTE — TELEPHONE ENCOUNTER
Virtual/Telephone Check-In    Kitty Hansel verbally consents to a Virtual/Telephone Check-In service on 03/18/20. Patient understands and accepts financial responsibility for any deductible, co-insurance and/or co-pays associated with this service.     Du

## 2020-03-18 NOTE — TELEPHONE ENCOUNTER
Action Requested: Summary for Provider     []  Critical Lab, Recommendations Needed  [] Need Additional Advice  []   FYI    []   Need Orders  [] Need Medications Sent to Pharmacy  []  Other     SUMMARY: Pt asking to be tested for COVID-19 was sent home fro

## 2020-03-20 RX ORDER — OSELTAMIVIR PHOSPHATE 75 MG/1
75 CAPSULE ORAL 2 TIMES DAILY
Qty: 10 CAPSULE | Refills: 0 | Status: SHIPPED | OUTPATIENT
Start: 2020-03-20 | End: 2020-03-25

## 2020-03-20 NOTE — TELEPHONE ENCOUNTER
Patient called to let Dr. Guillermina Johnson know that her dad had a respiratory swab recently and was told he has influenza A and not COVID-19. Patient states her father's physician recommended  she contact her PCP to get a script for Tamiflu.      Dr. Sarmiento Slider:   Berto Bland

## 2020-05-14 ENCOUNTER — TELEPHONE (OUTPATIENT)
Dept: OBGYN CLINIC | Facility: CLINIC | Age: 35
End: 2020-05-14

## 2020-05-23 ENCOUNTER — PATIENT MESSAGE (OUTPATIENT)
Dept: INTERNAL MEDICINE CLINIC | Facility: CLINIC | Age: 35
End: 2020-05-23

## 2020-05-24 ENCOUNTER — TELEPHONE (OUTPATIENT)
Dept: INTERNAL MEDICINE CLINIC | Facility: CLINIC | Age: 35
End: 2020-05-24

## 2020-05-25 NOTE — TELEPHONE ENCOUNTER
From: Mecca Munroe  To: Boone Figueroa. Andres Calderon MD  Sent: 5/23/2020 5:50 PM CDT  Subject: Other    Hello Dr Evelyn Elizondo been having plantar fasciitis pain like symptoms on my right foot since November.  The pain was manageable but these past two weeks have been

## 2020-05-25 NOTE — TELEPHONE ENCOUNTER
Couple of options for patient  1 to see podiatry  2 continue with rest, ice, stretching and take anti-inflammatories like Motrin 400 mg 2 times a day with meals.     Please let patient know    If there is no improvement, please have patient contact Dr. Babs Vieira

## 2020-05-26 NOTE — TELEPHONE ENCOUNTER
Pt has viewed my chart message    From  Nick Wilson RN To  Rich Llanes and Delivered  5/24/2020 10:08 PM   Last Read in [address]  5/25/2020 12:34 AM by Alcus Soulier Hi Iris,

## 2020-05-28 ENCOUNTER — PATIENT MESSAGE (OUTPATIENT)
Dept: INTERNAL MEDICINE CLINIC | Facility: CLINIC | Age: 35
End: 2020-05-28

## 2020-05-28 NOTE — TELEPHONE ENCOUNTER
Need to come in and be assessed. Cannot give a note without assessing her physically. Can see anyone one of us?

## 2020-05-28 NOTE — TELEPHONE ENCOUNTER
From: Denver Spear  To: Cristian Traore MD  Sent: 5/28/2020 12:51 PM CDT  Subject: Other    Hello, I'm taking Dr. Corey Dillard (on call doctor for Dr Oneyda Traore) advise and taking rest, still icing and stretching.  Just waiting to see if pain level will ease when

## 2020-05-31 PROBLEM — J20.9 ACUTE BRONCHITIS: Status: ACTIVE | Noted: 2019-01-02

## 2020-06-01 ENCOUNTER — OFFICE VISIT (OUTPATIENT)
Dept: INTERNAL MEDICINE CLINIC | Facility: CLINIC | Age: 35
End: 2020-06-01
Payer: COMMERCIAL

## 2020-06-01 VITALS
WEIGHT: 287.38 LBS | HEIGHT: 69 IN | DIASTOLIC BLOOD PRESSURE: 79 MMHG | RESPIRATION RATE: 18 BRPM | TEMPERATURE: 98 F | HEART RATE: 66 BPM | SYSTOLIC BLOOD PRESSURE: 122 MMHG | OXYGEN SATURATION: 97 % | BODY MASS INDEX: 42.56 KG/M2

## 2020-06-01 DIAGNOSIS — M72.2 PLANTAR FASCIITIS, RIGHT: ICD-10-CM

## 2020-06-01 DIAGNOSIS — Z00.00 ADULT GENERAL MEDICAL EXAM: Primary | ICD-10-CM

## 2020-06-01 PROCEDURE — 99214 OFFICE O/P EST MOD 30 MIN: CPT | Performed by: INTERNAL MEDICINE

## 2020-06-01 PROCEDURE — 36415 COLL VENOUS BLD VENIPUNCTURE: CPT | Performed by: INTERNAL MEDICINE

## 2020-06-01 NOTE — PATIENT INSTRUCTIONS
ASSESSMENT/PLAN:   Adult general medical exam  (primary encounter diagnosis) RTC for physical.     Plantar fasciitis, right Cup of ice and rub across for 10 minutes 2 times a day and especially at end of day. Don't leave on. Stretching exercises.  No end of the day there may be a dull aching. Treatment requires short-term rest and controlling swelling. It may take up to 9 months before all symptoms go away. Rarely, a steroid injection into the foot, or surgery, may be needed.   Home care  · If you are o your healthcare provider right away if any of these occur:  · Foot swelling  · Redness or warmth with increasing pain  Hien last reviewed this educational content on 5/1/2018  © 3721-4079 [employer].  [address] 1 fascia is partially cut to release tension. As you heal, fibrous tissue fills the space between the heel bone and the plantar fascia. Hien last reviewed this educational content on 1/1/2018  © 8802-4383 [employer].  [facility] include:  · Resting the foot. This involves limiting movements that make your foot hurt. You may also need to avoid certain sports and types of work for a time. · Using cold packs.  Put an ice pack (wrapped in a thin towel) on the heel and foot to help red these:  · Fever of 100.4°F (38°C) or higher, or as directed by your provider  · Chills  · Symptoms that don’t get better with treatment, or get worse  · New symptoms, such as numbness, tingling, or weakness in the foot  Hien last reviewed this educatio gain, flat foot, and having high arches. Wearing high heels, loose shoes, or shoes with poor arch support adds to the risk.    Foot pain is usually worse in the morning. But it improves with walking. By the end of the day there may be a dull aching.  Treatm care   Follow up with your healthcare provider, physical therapist, or foot specialist as advised. When to seek medical advice  Call your healthcare provider right away if any of these occur:  · The pain worsens.   · There is no relief after a few weeks of prescribed by your healthcare provider to stretch the plantar fascia and the muscles in the lower leg. Hien last reviewed this educational content on 10/1/2017  © 0695-2489 [employer]. 1407 [facility], [address] [place] [address].  All rig

## 2020-06-01 NOTE — PROGRESS NOTES
HPI:    Patient ID: James Allen is a 27year old female. Exercise level: somewhat active and has been following low salt diet. Weight has been stable.     Wt Readings from Last 3 Encounters:  06/01/20 : 287 lb 6.4 oz (130.4 kg)  09/18/19 : 270 lb (122 Constitutional: Negative for activity change, appetite change, chills, diaphoresis, fatigue, fever and unexpected weight change. Respiratory: Negative for apnea, cough, choking, chest tightness, shortness of breath, wheezing and stridor.     Gayle Castellanos • Hypertension Father    • Diabetes Paternal Grandmother    • Renal Disease Paternal Grandfather    • Diabetes Paternal Grandfather    • Hypertension Mother    • Cancer Maternal Grandfather         stomach   • Breast Cancer Maternal Aunt    • Colon Cancer Pulmonary/Chest: Effort normal and breath sounds normal. No accessory muscle usage. No apnea, no tachypnea and no bradypnea. She is not intubated. No respiratory distress. She has no decreased breath sounds. She has no wheezes. She has no rhonchi.  She has RTC for physical and follow up.

## 2020-06-12 ENCOUNTER — PATIENT MESSAGE (OUTPATIENT)
Dept: INTERNAL MEDICINE CLINIC | Facility: CLINIC | Age: 35
End: 2020-06-12

## 2020-06-12 NOTE — TELEPHONE ENCOUNTER
From: Negro Vela  To: Bal Arreaga. Josh Pandey MD  Sent: 6/12/2020 5:47 PM CDT  Subject: Other    Hi Dr. Josh Pandey, the Diclofenac Sodium prescription really worked well for my swelling and it took some pain away too.  But since yesterday it seems like the swellin

## 2020-07-04 PROBLEM — R31.29 MICROHEMATURIA: Status: ACTIVE | Noted: 2020-07-04

## 2020-07-04 PROBLEM — Z00.00 ADULT GENERAL MEDICAL EXAM: Status: ACTIVE | Noted: 2020-07-04

## 2020-07-04 PROBLEM — K58.9 IBS (IRRITABLE BOWEL SYNDROME): Status: ACTIVE | Noted: 2020-07-04

## 2020-07-04 PROBLEM — I49.3 PVC (PREMATURE VENTRICULAR CONTRACTION): Status: ACTIVE | Noted: 2020-07-04

## 2020-07-04 PROBLEM — J20.9 ACUTE BRONCHITIS: Status: RESOLVED | Noted: 2019-01-02 | Resolved: 2020-07-04

## 2020-07-06 ENCOUNTER — APPOINTMENT (OUTPATIENT)
Dept: LAB | Age: 35
End: 2020-07-06
Attending: INTERNAL MEDICINE
Payer: COMMERCIAL

## 2020-07-06 ENCOUNTER — OFFICE VISIT (OUTPATIENT)
Dept: INTERNAL MEDICINE CLINIC | Facility: CLINIC | Age: 35
End: 2020-07-06
Payer: COMMERCIAL

## 2020-07-06 VITALS
SYSTOLIC BLOOD PRESSURE: 112 MMHG | DIASTOLIC BLOOD PRESSURE: 73 MMHG | HEART RATE: 76 BPM | RESPIRATION RATE: 18 BRPM | HEIGHT: 69 IN | WEIGHT: 292.38 LBS | TEMPERATURE: 98 F | BODY MASS INDEX: 43.3 KG/M2 | OXYGEN SATURATION: 97 %

## 2020-07-06 DIAGNOSIS — R14.0 ABDOMINAL DISTENSION: ICD-10-CM

## 2020-07-06 DIAGNOSIS — I49.3 PVC (PREMATURE VENTRICULAR CONTRACTION): Primary | ICD-10-CM

## 2020-07-06 DIAGNOSIS — R63.5 WEIGHT GAIN: ICD-10-CM

## 2020-07-06 DIAGNOSIS — Z00.00 ADULT GENERAL MEDICAL EXAM: ICD-10-CM

## 2020-07-06 LAB
APPEARANCE: CLEAR
BILIRUB UR QL: NEGATIVE
BILIRUBIN: NEGATIVE
CLARITY UR: CLEAR
COLOR UR: YELLOW
GLUCOSE (URINE DIPSTICK): NEGATIVE MG/DL
GLUCOSE UR-MCNC: NEGATIVE MG/DL
IGA SERPL-MCNC: 356 MG/DL (ref 70–312)
KETONES (URINE DIPSTICK): NEGATIVE MG/DL
KETONES UR-MCNC: NEGATIVE MG/DL
LEUKOCYTE ESTERASE UR QL STRIP.AUTO: NEGATIVE
LEUKOCYTES: NEGATIVE
MULTISTIX LOT#: NORMAL NUMERIC
NITRITE UR QL STRIP.AUTO: NEGATIVE
NITRITE, URINE: NEGATIVE
PH UR: 5 [PH] (ref 5–8)
PH, URINE: 5.5 (ref 4.5–8)
PROT UR-MCNC: NEGATIVE MG/DL
PROTEIN (URINE DIPSTICK): NEGATIVE MG/DL
RBC #/AREA URNS AUTO: 3 /HPF
SP GR UR STRIP: 1.02 (ref 1–1.03)
SPECIFIC GRAVITY: 1.02 (ref 1–1.03)
TSI SER-ACNC: 1.13 MIU/ML (ref 0.36–3.74)
URINE-COLOR: YELLOW
UROBILINOGEN UR STRIP-ACNC: <2
UROBILINOGEN,SEMI-QN: 0.2 MG/DL (ref 0–1.9)
WBC #/AREA URNS AUTO: 2 /HPF

## 2020-07-06 PROCEDURE — 82784 ASSAY IGA/IGD/IGG/IGM EACH: CPT | Performed by: INTERNAL MEDICINE

## 2020-07-06 PROCEDURE — 99395 PREV VISIT EST AGE 18-39: CPT | Performed by: INTERNAL MEDICINE

## 2020-07-06 PROCEDURE — 81003 URINALYSIS AUTO W/O SCOPE: CPT | Performed by: INTERNAL MEDICINE

## 2020-07-06 PROCEDURE — 83516 IMMUNOASSAY NONANTIBODY: CPT | Performed by: INTERNAL MEDICINE

## 2020-07-06 PROCEDURE — 84443 ASSAY THYROID STIM HORMONE: CPT | Performed by: INTERNAL MEDICINE

## 2020-07-06 PROCEDURE — 36415 COLL VENOUS BLD VENIPUNCTURE: CPT | Performed by: INTERNAL MEDICINE

## 2020-07-06 PROCEDURE — 99213 OFFICE O/P EST LOW 20 MIN: CPT | Performed by: INTERNAL MEDICINE

## 2020-07-06 NOTE — PATIENT INSTRUCTIONS
ASSESSMENT/PLAN:   Pvc (premature ventricular contraction)  (primary encounter diagnosis) Resolved. Adult general medical exam Check urine. Weight gain Check blood. Bariatrics. Abdominal distension H/O celiac. Check blood. Low gluten diet.

## 2020-07-06 NOTE — PROGRESS NOTES
HPI:    Patient ID: Deb Cam is a 27year old female. Deb Cam is a 27year old female who presents for a complete physical exam.   HPI:   Patient presents with:  Physical: annual exam       Patient's last menstrual period was 05/30/2020.    Sy Refill   • acetaminophen 500 MG Oral Tab Take 500 mg by mouth every 6 (six) hours as needed for Pain. • SOOLANTRA 1 % External Cream      • ibuprofen (MOTRIN) 600 MG Oral Tab Take  by mouth.      • MedroxyPROGESTERone Acetate (DEPO-PROVERA IM)         P Exercise level: somewhat active and has been following low salt diet. Weight has been up.     Wt Readings from Last 3 Encounters:  07/06/20 : 292 lb 6.4 oz (132.6 kg)  06/01/20 : 287 lb 6.4 oz (130.4 kg)  09/18/19 : 270 lb (122.5 kg)    BP Readings fro weekend. Saw GI in the past.    Endocrine: Negative for cold intolerance, heat intolerance, polydipsia, polyphagia and polyuria.    Genitourinary: Negative for decreased urine volume, difficulty urinating, dysuria, flank pain, frequency, hematuria, menstrua Diabetes Father    • Hypertension Father    • Diabetes Paternal Grandmother    • Renal Disease Paternal Grandfather    • Diabetes Paternal Grandfather    • Hypertension Mother    • Cancer Maternal Grandfather         stomach   • Breast Cancer Maternal Aunt not perforated, not erythematous, not retracted and not bulging. Tympanic membrane mobility is normal.  No middle ear effusion. No hemotympanum. No decreased hearing is noted.    Nose: Nose normal. No mucosal edema, rhinorrhea, nose lacerations, sinus tende 2+ on the right side and 2+ on the left side. Radial pulses are 2+ on the right side and 2+ on the left side. Dorsalis pedis pulses are 2+ on the right side and 2+ on the left side.         Posterior tibial pulses are 2+ on the right side and 2 (premature ventricular contraction)  (primary encounter diagnosis) Resolved. Adult general medical exam Check urine. Weight gain Check blood. Bariatrics. Abdominal distension H/O celiac. Check blood. Low gluten diet.      Orders Placed This [place]

## 2020-07-07 LAB — TTG IGA SER-ACNC: 1 U/ML (ref ?–7)

## 2020-07-13 ENCOUNTER — PATIENT MESSAGE (OUTPATIENT)
Dept: INTERNAL MEDICINE CLINIC | Facility: CLINIC | Age: 35
End: 2020-07-13

## 2020-07-13 NOTE — TELEPHONE ENCOUNTER
From: Yamileth Vyas  To: Becca Berrios MD  Sent: 7/13/2020 8:27 AM CDT  Subject: Test Results Question    I have a question about URINALYSIS, ROUTINE resulted on 7/6/20, 9:49 PM.    Good morning Dr Sera Berrios, I actually did start my cycle on Friday July 1

## 2020-07-21 ENCOUNTER — OFFICE VISIT (OUTPATIENT)
Dept: OBGYN CLINIC | Facility: CLINIC | Age: 35
End: 2020-07-21
Payer: COMMERCIAL

## 2020-07-21 VITALS
BODY MASS INDEX: 43.4 KG/M2 | DIASTOLIC BLOOD PRESSURE: 60 MMHG | WEIGHT: 293 LBS | SYSTOLIC BLOOD PRESSURE: 112 MMHG | HEIGHT: 69 IN

## 2020-07-21 DIAGNOSIS — Z01.419 ENCOUNTER FOR WELL WOMAN EXAM WITH ROUTINE GYNECOLOGICAL EXAM: Primary | ICD-10-CM

## 2020-07-21 DIAGNOSIS — Z01.419 ENCOUNTER FOR GYNECOLOGICAL EXAMINATION WITHOUT ABNORMAL FINDING: ICD-10-CM

## 2020-07-21 PROCEDURE — 3074F SYST BP LT 130 MM HG: CPT | Performed by: OBSTETRICS & GYNECOLOGY

## 2020-07-21 PROCEDURE — 99395 PREV VISIT EST AGE 18-39: CPT | Performed by: OBSTETRICS & GYNECOLOGY

## 2020-07-21 PROCEDURE — 3008F BODY MASS INDEX DOCD: CPT | Performed by: OBSTETRICS & GYNECOLOGY

## 2020-07-21 PROCEDURE — 3078F DIAST BP <80 MM HG: CPT | Performed by: OBSTETRICS & GYNECOLOGY

## 2020-07-21 NOTE — PROGRESS NOTES
HPI:    Patient ID: Callum Guillermo is a 27year old female. Patient here for routine exam.  Patient stopped her Depo-Provera injections in May. Partner using condoms.   Desires other method of contraception that will not cause as much weight gain as the adnexal masses. NT. Musculoskeletal: Normal range of motion. Lymphadenopathy:     She has no cervical adenopathy. Neurological: She is alert and oriented to person, place, and time. Skin: Skin is warm and dry.    Psychiatric: She has a normal moo

## 2020-07-22 LAB — HPV I/H RISK 1 DNA SPEC QL NAA+PROBE: NEGATIVE

## 2020-09-03 ENCOUNTER — LAB ENCOUNTER (OUTPATIENT)
Dept: LAB | Facility: HOSPITAL | Age: 35
End: 2020-09-03
Attending: INTERNAL MEDICINE
Payer: COMMERCIAL

## 2020-09-03 ENCOUNTER — OFFICE VISIT (OUTPATIENT)
Dept: SURGERY | Facility: CLINIC | Age: 35
End: 2020-09-03
Payer: COMMERCIAL

## 2020-09-03 ENCOUNTER — PATIENT MESSAGE (OUTPATIENT)
Dept: INTERNAL MEDICINE CLINIC | Facility: CLINIC | Age: 35
End: 2020-09-03

## 2020-09-03 VITALS
DIASTOLIC BLOOD PRESSURE: 78 MMHG | HEIGHT: 69 IN | HEART RATE: 69 BPM | SYSTOLIC BLOOD PRESSURE: 124 MMHG | BODY MASS INDEX: 43.4 KG/M2 | WEIGHT: 293 LBS | OXYGEN SATURATION: 97 %

## 2020-09-03 DIAGNOSIS — R60.0 LOWER EXTREMITY EDEMA: ICD-10-CM

## 2020-09-03 DIAGNOSIS — E78.00 HYPERCHOLESTEROLEMIA WITHOUT HYPERTRIGLYCERIDEMIA: Primary | ICD-10-CM

## 2020-09-03 DIAGNOSIS — R31.21 ASYMPTOMATIC MICROSCOPIC HEMATURIA: ICD-10-CM

## 2020-09-03 DIAGNOSIS — F43.9 STRESS: ICD-10-CM

## 2020-09-03 DIAGNOSIS — E66.01 MORBID OBESITY WITH BMI OF 40.0-44.9, ADULT (HCC): ICD-10-CM

## 2020-09-03 DIAGNOSIS — R31.21 ASYMPTOMATIC MICROSCOPIC HEMATURIA: Primary | ICD-10-CM

## 2020-09-03 DIAGNOSIS — E78.00 HYPERCHOLESTEROLEMIA WITHOUT HYPERTRIGLYCERIDEMIA: ICD-10-CM

## 2020-09-03 LAB
BILIRUB UR QL: NEGATIVE
COLOR UR: YELLOW
GLUCOSE UR-MCNC: NEGATIVE MG/DL
KETONES UR-MCNC: NEGATIVE MG/DL
LEUKOCYTE ESTERASE UR QL STRIP.AUTO: NEGATIVE
NITRITE UR QL STRIP.AUTO: NEGATIVE
PH UR: 5 [PH] (ref 5–8)
PROT UR-MCNC: 30 MG/DL
RBC #/AREA URNS AUTO: 700 /HPF
SP GR UR STRIP: 1.03 (ref 1–1.03)
UROBILINOGEN UR STRIP-ACNC: <2
WBC #/AREA URNS AUTO: 12 /HPF

## 2020-09-03 PROCEDURE — 93005 ELECTROCARDIOGRAM TRACING: CPT

## 2020-09-03 PROCEDURE — 93010 ELECTROCARDIOGRAM REPORT: CPT | Performed by: INTERNAL MEDICINE

## 2020-09-03 PROCEDURE — 3078F DIAST BP <80 MM HG: CPT | Performed by: INTERNAL MEDICINE

## 2020-09-03 PROCEDURE — 99204 OFFICE O/P NEW MOD 45 MIN: CPT | Performed by: INTERNAL MEDICINE

## 2020-09-03 PROCEDURE — 3008F BODY MASS INDEX DOCD: CPT | Performed by: INTERNAL MEDICINE

## 2020-09-03 PROCEDURE — 3074F SYST BP LT 130 MM HG: CPT | Performed by: INTERNAL MEDICINE

## 2020-09-03 PROCEDURE — 81001 URINALYSIS AUTO W/SCOPE: CPT

## 2020-09-03 RX ORDER — HYDROCHLOROTHIAZIDE 12.5 MG/1
12.5 CAPSULE, GELATIN COATED ORAL DAILY
Qty: 30 CAPSULE | Refills: 1 | Status: SHIPPED | OUTPATIENT
Start: 2020-09-03 | End: 2020-10-13

## 2020-09-03 RX ORDER — TOPIRAMATE 25 MG/1
25 TABLET ORAL EVERY EVENING
Qty: 30 TABLET | Refills: 2 | Status: SHIPPED | OUTPATIENT
Start: 2020-09-03 | End: 2020-10-12

## 2020-09-03 RX ORDER — PHENTERMINE HYDROCHLORIDE 30 MG/1
30 CAPSULE ORAL EVERY MORNING
Qty: 30 CAPSULE | Refills: 1 | Status: SHIPPED | OUTPATIENT
Start: 2020-09-03 | End: 2020-10-13

## 2020-09-03 NOTE — TELEPHONE ENCOUNTER
From: Meryl Maat  To: Brian Blount. Katelin Mixon MD  Sent: 9/3/2020 12:42 PM CDT  Subject: Other    Good afternoon Dr Katelin Mixon, just FYI I did the urine test today but when I got home after it I noticed that I got my menstrual. It wasn't due until one more week.

## 2020-09-03 NOTE — PROGRESS NOTES
[facility] Consultation Note       Patient:  Derek Caba  :      11/3/1985  MRN:      [id number]    Referring Provider: Dr. Barbara De Leon       Chief Complaint:  Patient presents with:  Consult  Weight Management      SUBJECTIVE     Histo Oral Tab Take 1 tablet (25 mg total) by mouth every evening. 30 tablet 2   • acetaminophen 500 MG Oral Tab Take 500 mg by mouth every 6 (six) hours as needed for Pain. • ibuprofen (MOTRIN) 600 MG Oral Tab Take  by mouth.      • SOOLANTRA 1 % External Cr Transfusions: Not Asked        Caffeine Concern: Yes          Soda        Occupational Exposure: Not Asked        Hobby Hazards: Not Asked        Sleep Concern: Not Asked        Stress Concern: Not Asked        Weight Concern: Not Asked        Special Diet cues and Eat slowly and take 20 to 30 minutes to complete each meal      ROS:  Constitutional: positive for fatigue  Respiratory: positive for dyspnea on exertion  Cardiovascular: negative  Gastrointestinal: positive for reflux symptoms  Musculoskeletal:po 48-64 ounces of non-caloric beverages per day. No fruit juices or regular soda. 3. Increase activity-upper body exercises, walk 10 minutes per day. 4. Increase fruit and vegetable servings to 5-6 per day.       PHENTERMINE: Since the patient would like to

## 2020-09-23 ENCOUNTER — LAB ENCOUNTER (OUTPATIENT)
Dept: LAB | Facility: HOSPITAL | Age: 35
End: 2020-09-23
Attending: INTERNAL MEDICINE
Payer: COMMERCIAL

## 2020-09-23 DIAGNOSIS — R31.21 ASYMPTOMATIC MICROSCOPIC HEMATURIA: ICD-10-CM

## 2020-09-23 LAB
BACTERIA UR QL AUTO: NEGATIVE /HPF
BILIRUB UR QL: NEGATIVE
CLARITY UR: CLEAR
COLOR UR: YELLOW
GLUCOSE UR-MCNC: NEGATIVE MG/DL
KETONES UR-MCNC: NEGATIVE MG/DL
LEUKOCYTE ESTERASE UR QL STRIP.AUTO: NEGATIVE
NITRITE UR QL STRIP.AUTO: NEGATIVE
PH UR: 6 [PH] (ref 5–8)
PROT UR-MCNC: NEGATIVE MG/DL
RBC #/AREA URNS AUTO: <1 /HPF
SP GR UR STRIP: 1.01 (ref 1–1.03)
UROBILINOGEN UR STRIP-ACNC: <2
WBC #/AREA URNS AUTO: <1 /HPF

## 2020-09-23 PROCEDURE — 81001 URINALYSIS AUTO W/SCOPE: CPT

## 2020-09-29 ENCOUNTER — TELEPHONE (OUTPATIENT)
Dept: INTERNAL MEDICINE CLINIC | Facility: CLINIC | Age: 35
End: 2020-09-29

## 2020-09-29 ENCOUNTER — OFFICE VISIT (OUTPATIENT)
Dept: INTERNAL MEDICINE CLINIC | Facility: CLINIC | Age: 35
End: 2020-09-29
Payer: COMMERCIAL

## 2020-09-29 VITALS
BODY MASS INDEX: 43.1 KG/M2 | OXYGEN SATURATION: 99 % | WEIGHT: 291 LBS | HEART RATE: 78 BPM | TEMPERATURE: 98 F | DIASTOLIC BLOOD PRESSURE: 70 MMHG | SYSTOLIC BLOOD PRESSURE: 118 MMHG | HEIGHT: 69 IN | RESPIRATION RATE: 17 BRPM

## 2020-09-29 DIAGNOSIS — R30.0 DYSURIA: Primary | ICD-10-CM

## 2020-09-29 LAB
APPEARANCE: CLEAR
BILIRUB UR QL: NEGATIVE
CLARITY UR: CLEAR
COLOR UR: YELLOW
GLUCOSE UR-MCNC: NEGATIVE MG/DL
KETONES UR-MCNC: NEGATIVE MG/DL
MULTISTIX LOT#: 1044 NUMERIC
NITRITE UR QL STRIP.AUTO: NEGATIVE
PH UR: 7 [PH] (ref 5–8)
PH, URINE: 7 (ref 4.5–8)
PROT UR-MCNC: NEGATIVE MG/DL
RBC #/AREA URNS AUTO: 0 /HPF
SP GR UR STRIP: 1 (ref 1–1.03)
SPECIFIC GRAVITY: 1.01 (ref 1–1.03)
URINE-COLOR: YELLOW
UROBILINOGEN UR STRIP-ACNC: <2
UROBILINOGEN,SEMI-QN: 0.2 MG/DL (ref 0–1.9)
WBC #/AREA URNS AUTO: 5 /HPF

## 2020-09-29 PROCEDURE — 3074F SYST BP LT 130 MM HG: CPT | Performed by: NURSE PRACTITIONER

## 2020-09-29 PROCEDURE — 99213 OFFICE O/P EST LOW 20 MIN: CPT | Performed by: NURSE PRACTITIONER

## 2020-09-29 PROCEDURE — 3008F BODY MASS INDEX DOCD: CPT | Performed by: NURSE PRACTITIONER

## 2020-09-29 PROCEDURE — 3078F DIAST BP <80 MM HG: CPT | Performed by: NURSE PRACTITIONER

## 2020-09-29 PROCEDURE — 81003 URINALYSIS AUTO W/O SCOPE: CPT | Performed by: NURSE PRACTITIONER

## 2020-09-29 RX ORDER — SULFAMETHOXAZOLE AND TRIMETHOPRIM 800; 160 MG/1; MG/1
1 TABLET ORAL 2 TIMES DAILY
Qty: 10 TABLET | Refills: 0 | Status: SHIPPED | OUTPATIENT
Start: 2020-09-29 | End: 2020-10-04

## 2020-09-29 NOTE — TELEPHONE ENCOUNTER
Can se come in and be evaluated and check urine. Urine done in September is not can show anything if she is having symptoms now. There are other things that can cause UTI symptoms.

## 2020-09-29 NOTE — PROGRESS NOTES
HPI:    Patient ID: Sherryn Dance is a 27year old female. Dysuria, urinary frequency, and suprapubic pain x 3 days. No aggravating or relieving factors. Tx: Increased fluids, cranberry juice and cranberry pills with no resolve.  Denies fever, chills, fl Abdominal: Normal appearance. There is no hepatosplenomegaly. There is abdominal tenderness in the suprapubic area. There is no guarding and no CVA tenderness.               ASSESSMENT/PLAN:   Dysuria  (primary encounter diagnosis)   Urine positive for bl

## 2020-09-29 NOTE — TELEPHONE ENCOUNTER
Advised patient of Dr. Elsy Garibay note. Patient verbalized understanding. No appointment available with .   Appointment scheduled for today at 5pm with CHAS Elaine at the [facility] [place].

## 2020-09-29 NOTE — TELEPHONE ENCOUNTER
Action Requested: Summary for Provider     []  Critical Lab, Recommendations Needed  [] Need Additional Advice  []   FYI    []   Need Orders  [] Need Medications Sent to Pharmacy  []  Other     SUMMARY: pt seeking recommendations for urinary symptoms.

## 2020-09-29 NOTE — TELEPHONE ENCOUNTER
----- Message from Emily Ashford sent at 9/29/2020  2:30 AM CDT -----  Regarding: Other  Contact: [phone]  Leyla Tan, these past two days I have experience UTI symptoms and now it became a little uncomfortable and painful.  I feel like I have to pe

## 2020-09-29 NOTE — PATIENT INSTRUCTIONS
ASSESSMENT/PLAN:   Dysuria  (primary encounter diagnosis)  Start antibiotics ASAP and take as directed with food til done. Take probiotics while on antibiotics if can to prevent yeast infections. Stop cholesterol medicines while on antibiotics.  Increase

## 2020-10-01 RX ORDER — NITROFURANTOIN 25; 75 MG/1; MG/1
100 CAPSULE ORAL 2 TIMES DAILY
Qty: 20 CAPSULE | Refills: 0 | Status: SHIPPED | OUTPATIENT
Start: 2020-10-01 | End: 2020-10-11

## 2020-10-01 NOTE — PROGRESS NOTES
Spoke to pt, reg. UA and culture results. Advised to stop current antibiotics and start antibiotics sent to pharm. Pt agreed no further questions at this time.

## 2020-10-05 ENCOUNTER — TELEPHONE (OUTPATIENT)
Dept: OBGYN CLINIC | Facility: CLINIC | Age: 35
End: 2020-10-05

## 2020-10-05 ENCOUNTER — LAB ENCOUNTER (OUTPATIENT)
Dept: LAB | Facility: HOSPITAL | Age: 35
End: 2020-10-05
Attending: OBSTETRICS & GYNECOLOGY
Payer: COMMERCIAL

## 2020-10-05 DIAGNOSIS — Z30.42 DEPO-PROVERA CONTRACEPTIVE STATUS: ICD-10-CM

## 2020-10-05 DIAGNOSIS — Z30.42 DEPO-PROVERA CONTRACEPTIVE STATUS: Primary | ICD-10-CM

## 2020-10-05 PROCEDURE — 84702 CHORIONIC GONADOTROPIN TEST: CPT

## 2020-10-05 PROCEDURE — 36415 COLL VENOUS BLD VENIPUNCTURE: CPT

## 2020-10-05 NOTE — TELEPHONE ENCOUNTER
Patient calling wanting the Depo shot. She was seen for her annual in July of 2020.      Please contact

## 2020-10-05 NOTE — TELEPHONE ENCOUNTER
Pt Name and  verified. Pt thought about her contraception options that she discussed with MLM at her annual visit. Pt wants to continue using the depo as she has been.  Advised that she needs to complete a blood pregnancy lab test and order placed per p

## 2020-10-06 ENCOUNTER — NURSE ONLY (OUTPATIENT)
Dept: OBGYN CLINIC | Facility: CLINIC | Age: 35
End: 2020-10-06
Payer: COMMERCIAL

## 2020-10-06 PROCEDURE — 96372 THER/PROPH/DIAG INJ SC/IM: CPT | Performed by: OBSTETRICS & GYNECOLOGY

## 2020-10-06 RX ADMIN — MEDROXYPROGESTERONE ACETATE 150 MG: 150 INJECTION, SUSPENSION INTRAMUSCULAR at 14:54:00

## 2020-10-06 NOTE — PROGRESS NOTES
Depo provera 150 mg IM administered to the pt in the right deltoid. Pt tolerated well. No adverse effects.

## 2020-10-10 ENCOUNTER — TELEPHONE (OUTPATIENT)
Dept: INTERNAL MEDICINE CLINIC | Facility: CLINIC | Age: 35
End: 2020-10-10

## 2020-10-10 RX ORDER — FLUCONAZOLE 150 MG/1
150 TABLET ORAL ONCE
Qty: 1 TABLET | Refills: 0 | Status: SHIPPED | OUTPATIENT
Start: 2020-10-10 | End: 2020-10-10

## 2020-10-10 NOTE — TELEPHONE ENCOUNTER
Patient called stating that she has yeast infection after antibiotics. Send fluconazole to the pharmacy.

## 2020-10-11 RX ORDER — FLUCONAZOLE 150 MG/1
150 TABLET ORAL ONCE
Qty: 1 TABLET | Refills: 0 | Status: SHIPPED | OUTPATIENT
Start: 2020-10-11 | End: 2020-10-11

## 2020-10-12 ENCOUNTER — OFFICE VISIT (OUTPATIENT)
Dept: INTERNAL MEDICINE CLINIC | Facility: CLINIC | Age: 35
End: 2020-10-12
Payer: COMMERCIAL

## 2020-10-12 VITALS
HEIGHT: 69 IN | DIASTOLIC BLOOD PRESSURE: 72 MMHG | WEIGHT: 282 LBS | TEMPERATURE: 98 F | BODY MASS INDEX: 41.77 KG/M2 | RESPIRATION RATE: 18 BRPM | OXYGEN SATURATION: 98 % | HEART RATE: 70 BPM | SYSTOLIC BLOOD PRESSURE: 106 MMHG

## 2020-10-12 DIAGNOSIS — N76.0 ACUTE VAGINITIS: ICD-10-CM

## 2020-10-12 DIAGNOSIS — E78.00 HYPERCHOLESTEROLEMIA WITHOUT HYPERTRIGLYCERIDEMIA: Primary | ICD-10-CM

## 2020-10-12 PROCEDURE — 3074F SYST BP LT 130 MM HG: CPT | Performed by: INTERNAL MEDICINE

## 2020-10-12 PROCEDURE — 99214 OFFICE O/P EST MOD 30 MIN: CPT | Performed by: INTERNAL MEDICINE

## 2020-10-12 PROCEDURE — 36415 COLL VENOUS BLD VENIPUNCTURE: CPT | Performed by: INTERNAL MEDICINE

## 2020-10-12 PROCEDURE — 3078F DIAST BP <80 MM HG: CPT | Performed by: INTERNAL MEDICINE

## 2020-10-12 PROCEDURE — 3008F BODY MASS INDEX DOCD: CPT | Performed by: INTERNAL MEDICINE

## 2020-10-12 RX ORDER — MEDROXYPROGESTERONE ACETATE 150 MG/ML
150 INJECTION, SUSPENSION INTRAMUSCULAR
COMMUNITY
End: 2020-10-12 | Stop reason: ALTCHOICE

## 2020-10-12 NOTE — PROGRESS NOTES
HPI:    Patient ID: Leodan Pelaez is a 27year old female. Exercise level: trying to do more (walking for 2 miles QOD) and has been following low salt diet. Weight has been down. Sees bariatrics. LNMP:10-2-20. Back on depo. on 10-6-20.      Wt Readi She has not been passing clots. She has not been passing tissue. Exacerbated by: ABx. for UTI. Treatments tried: Diflucan X 1 2 days ago. She is sexually active. No, her partner does not have an STD. She uses nothing for contraception.  Her menstrual hist Bartholin's cyst 2013, 2012    Surgical. 2012-Bartholin's abscess, drug therapy   • History of Papanicolaou smear of cervix 11/12/2013   • Hypercholesterolemia without hypertriglyceridemia    • IBS (irritable bowel syndrome)    • Lipid screening 04/11/2013 appearance. She does not appear ill. No distress. She is not intubated. HENT:   Mouth/Throat: Uvula is midline, oropharynx is clear and moist and mucous membranes are normal. No oropharyngeal exudate. Eyes: Conjunctivae are normal. No scleral icterus. vagina. No foreign body in the vagina. No signs of injury in the vagina. Musculoskeletal:         General: No edema. Lymphadenopathy:        Head (right side): No submandibular, no preauricular and no posterior auricular adenopathy present.

## 2020-10-12 NOTE — TELEPHONE ENCOUNTER
Name and  verified. Pt informed. Verbalized good understanding of all with intent to comply. Patient states that she picked up the medication on Saturday and took it on Saturday evening.   It was sent in by Dr. Jaime Grande.  States that the discharge has

## 2020-10-13 ENCOUNTER — OFFICE VISIT (OUTPATIENT)
Dept: SURGERY | Facility: CLINIC | Age: 35
End: 2020-10-13
Payer: COMMERCIAL

## 2020-10-13 ENCOUNTER — TELEPHONE (OUTPATIENT)
Dept: INTERNAL MEDICINE CLINIC | Facility: CLINIC | Age: 35
End: 2020-10-13

## 2020-10-13 VITALS
DIASTOLIC BLOOD PRESSURE: 67 MMHG | HEART RATE: 64 BPM | OXYGEN SATURATION: 98 % | WEIGHT: 282 LBS | BODY MASS INDEX: 41.77 KG/M2 | SYSTOLIC BLOOD PRESSURE: 103 MMHG | HEIGHT: 69 IN

## 2020-10-13 DIAGNOSIS — E78.00 HYPERCHOLESTEROLEMIA WITHOUT HYPERTRIGLYCERIDEMIA: Primary | ICD-10-CM

## 2020-10-13 DIAGNOSIS — E66.01 MORBID OBESITY WITH BMI OF 40.0-44.9, ADULT (HCC): ICD-10-CM

## 2020-10-13 DIAGNOSIS — R60.0 LOWER EXTREMITY EDEMA: ICD-10-CM

## 2020-10-13 DIAGNOSIS — Z51.81 ENCOUNTER FOR THERAPEUTIC DRUG MONITORING: ICD-10-CM

## 2020-10-13 PROCEDURE — 3074F SYST BP LT 130 MM HG: CPT | Performed by: INTERNAL MEDICINE

## 2020-10-13 PROCEDURE — 3008F BODY MASS INDEX DOCD: CPT | Performed by: INTERNAL MEDICINE

## 2020-10-13 PROCEDURE — 99214 OFFICE O/P EST MOD 30 MIN: CPT | Performed by: INTERNAL MEDICINE

## 2020-10-13 PROCEDURE — 3078F DIAST BP <80 MM HG: CPT | Performed by: INTERNAL MEDICINE

## 2020-10-13 RX ORDER — HYDROCHLOROTHIAZIDE 12.5 MG/1
12.5 CAPSULE, GELATIN COATED ORAL DAILY
Qty: 30 CAPSULE | Refills: 1 | Status: SHIPPED | OUTPATIENT
Start: 2020-10-13

## 2020-10-13 RX ORDER — PHENTERMINE HYDROCHLORIDE 30 MG/1
30 CAPSULE ORAL EVERY MORNING
Qty: 30 CAPSULE | Refills: 1 | Status: SHIPPED | OUTPATIENT
Start: 2020-10-13 | End: 2021-01-19

## 2020-10-13 NOTE — TELEPHONE ENCOUNTER
I didn't notice and either Efrain Passy. I called the patient she is coming on Thursday to [facility], I didn't want to send her to another lab because the charge for venipuncture is there already. I just told her to come fasting on Thursday and ask for me.

## 2020-10-13 NOTE — TELEPHONE ENCOUNTER
Eduin from [facility] would like to speak with Dr. Christal Kam MA because they couldn't work on the blood work they received yesterday. Please call her back. Thank you.

## 2020-10-13 NOTE — TELEPHONE ENCOUNTER
Spoke to patient letting her know they couldn't process the blood work. To please come on Thursday around 10:00 to 10:30 for me to draw her blood and ask for me.

## 2020-10-13 NOTE — PROGRESS NOTES
[address]  Dept: [phone]       Patient:  Rand Fuentes  :      11/3/1985  MRN:      [id number]    Chief Complaint:  Patient present mouth. Allergies:  Patient has no known allergies.      Social History:  Social History    Socioeconomic History      Marital status:       Spouse name: Not on file      Number of children: Not on file      Years of education: Not on file Seat Belt: Not Asked        Self-Exams: Not Asked    Social History Narrative      Not on file    Surgical History:    Past Surgical History:   Procedure Laterality Date   • EGD  8/11   • OTHER SURGICAL HISTORY      Bartholin's cyst surgery   • OTHER SURGI negative  Cardiovascular: negative  Gastrointestinal: negative  Musculoskeletal:negative  Neurological: negative  Behavioral/Psych: negative  Endocrine: negative  All other systems were reviewed and are negative    Physical Exam:   General appearance: aler per day. 4. Increase fruit and vegetable servings to 5-6 per day.       Tolerating Phentermine at 30 mg  Continue at current dose  ekg done    Did not tolerate topiramate    hctz PRN    Diagnoses and all orders for this visit:    Hypercholesterolemia witho

## 2020-12-23 ENCOUNTER — NURSE ONLY (OUTPATIENT)
Dept: OBGYN CLINIC | Facility: CLINIC | Age: 35
End: 2020-12-23
Payer: COMMERCIAL

## 2020-12-23 VITALS — DIASTOLIC BLOOD PRESSURE: 78 MMHG | SYSTOLIC BLOOD PRESSURE: 110 MMHG

## 2020-12-23 DIAGNOSIS — Z30.42 ENCOUNTER FOR SURVEILLANCE OF INJECTABLE CONTRACEPTIVE: Primary | ICD-10-CM

## 2020-12-23 PROCEDURE — 96372 THER/PROPH/DIAG INJ SC/IM: CPT | Performed by: OBSTETRICS & GYNECOLOGY

## 2020-12-23 RX ADMIN — MEDROXYPROGESTERONE ACETATE 150 MG: 150 INJECTION, SUSPENSION INTRAMUSCULAR at 09:30:00

## 2020-12-29 RX ORDER — MEDROXYPROGESTERONE ACETATE 150 MG/ML
150 INJECTION, SUSPENSION INTRAMUSCULAR ONCE
Status: COMPLETED | OUTPATIENT
Start: 2020-12-29 | End: 2020-12-23

## 2021-01-19 ENCOUNTER — OFFICE VISIT (OUTPATIENT)
Dept: SURGERY | Facility: CLINIC | Age: 36
End: 2021-01-19
Payer: COMMERCIAL

## 2021-01-19 VITALS
BODY MASS INDEX: 41.62 KG/M2 | OXYGEN SATURATION: 96 % | SYSTOLIC BLOOD PRESSURE: 111 MMHG | WEIGHT: 281 LBS | DIASTOLIC BLOOD PRESSURE: 71 MMHG | HEIGHT: 69 IN | HEART RATE: 74 BPM

## 2021-01-19 DIAGNOSIS — E66.01 MORBID OBESITY WITH BMI OF 40.0-44.9, ADULT (HCC): ICD-10-CM

## 2021-01-19 DIAGNOSIS — F43.9 STRESS: ICD-10-CM

## 2021-01-19 DIAGNOSIS — Z51.81 ENCOUNTER FOR THERAPEUTIC DRUG MONITORING: ICD-10-CM

## 2021-01-19 DIAGNOSIS — E78.00 HYPERCHOLESTEROLEMIA WITHOUT HYPERTRIGLYCERIDEMIA: Primary | ICD-10-CM

## 2021-01-19 PROCEDURE — 99214 OFFICE O/P EST MOD 30 MIN: CPT | Performed by: INTERNAL MEDICINE

## 2021-01-19 PROCEDURE — 3074F SYST BP LT 130 MM HG: CPT | Performed by: INTERNAL MEDICINE

## 2021-01-19 PROCEDURE — 3078F DIAST BP <80 MM HG: CPT | Performed by: INTERNAL MEDICINE

## 2021-01-19 PROCEDURE — 3008F BODY MASS INDEX DOCD: CPT | Performed by: INTERNAL MEDICINE

## 2021-01-19 RX ORDER — PHENTERMINE HYDROCHLORIDE 30 MG/1
30 CAPSULE ORAL EVERY MORNING
Qty: 30 CAPSULE | Refills: 2 | Status: SHIPPED | OUTPATIENT
Start: 2021-01-19 | End: 2021-04-20

## 2021-01-19 NOTE — PROGRESS NOTES
[address]  Dept: [phone]       Patient:  Eliezer Chahal  :      11/3/1985  MRN:      [id number]    Chief Complaint:  Patient present Patient has no known allergies.      Social History:  Social History    Socioeconomic History      Marital status:       Spouse name: Not on file      Number of children: Not on file      Years of education: Not on file      Highest education level: Self-Exams: Not Asked    Social History Narrative      Not on file    Surgical History:    Past Surgical History:   Procedure Laterality Date   • EGD  8/11   • OTHER SURGICAL HISTORY      Bartholin's cyst surgery   • OTHER SURGICAL HISTORY  8/1/2011    E negative  Gastrointestinal: negative  Musculoskeletal:negative  Neurological: negative  Behavioral/Psych: negative  Endocrine: negative  All other systems were reviewed and are negative    Physical Exam:   General appearance: alert, appears stated age,  Increase fruit and vegetable servings to 5-6 per day.       Tolerating Phentermine at 30 mg  Continue at current dose  ekg done    Did not tolerate topiramate    hctz PRN    Diagnoses and all orders for this visit:    Hypercholesterolemia without hypertrigl

## 2021-02-24 ENCOUNTER — APPOINTMENT (OUTPATIENT)
Dept: GENERAL RADIOLOGY | Facility: HOSPITAL | Age: 36
End: 2021-02-24
Attending: EMERGENCY MEDICINE
Payer: COMMERCIAL

## 2021-02-24 ENCOUNTER — HOSPITAL ENCOUNTER (EMERGENCY)
Facility: HOSPITAL | Age: 36
Discharge: HOME | End: 2021-02-24
Attending: EMERGENCY MEDICINE
Payer: COMMERCIAL

## 2021-02-24 ENCOUNTER — NURSE TRIAGE (OUTPATIENT)
Dept: INTERNAL MEDICINE CLINIC | Facility: CLINIC | Age: 36
End: 2021-02-24

## 2021-02-24 VITALS
RESPIRATION RATE: 20 BRPM | OXYGEN SATURATION: 95 % | SYSTOLIC BLOOD PRESSURE: 110 MMHG | TEMPERATURE: 100 F | HEART RATE: 78 BPM | BODY MASS INDEX: 41.47 KG/M2 | WEIGHT: 280 LBS | HEIGHT: 69 IN | DIASTOLIC BLOOD PRESSURE: 69 MMHG

## 2021-02-24 DIAGNOSIS — R00.2 PALPITATIONS: Primary | ICD-10-CM

## 2021-02-24 DIAGNOSIS — R07.89 CHEST PAIN, ATYPICAL: ICD-10-CM

## 2021-02-24 LAB
ANION GAP SERPL CALC-SCNC: 6 MMOL/L (ref 0–18)
B-HCG UR QL: NEGATIVE
BASOPHILS # BLD AUTO: 0.05 X10(3) UL (ref 0–0.2)
BASOPHILS NFR BLD AUTO: 0.6 %
BILIRUB UR QL: NEGATIVE
BUN BLD-MCNC: 8 MG/DL (ref 7–18)
BUN/CREAT SERPL: 11.4 (ref 10–20)
CALCIUM BLD-MCNC: 9.1 MG/DL (ref 8.5–10.1)
CHLORIDE SERPL-SCNC: 109 MMOL/L (ref 98–112)
CLARITY UR: CLEAR
CO2 SERPL-SCNC: 26 MMOL/L (ref 21–32)
COLOR UR: YELLOW
CREAT BLD-MCNC: 0.7 MG/DL
D DIMER PPP FEU-MCNC: 0.28 UG/ML FEU (ref ?–0.5)
DEPRECATED RDW RBC AUTO: 46.8 FL (ref 35.1–46.3)
EOSINOPHIL # BLD AUTO: 0.11 X10(3) UL (ref 0–0.7)
EOSINOPHIL NFR BLD AUTO: 1.4 %
ERYTHROCYTE [DISTWIDTH] IN BLOOD BY AUTOMATED COUNT: 14.4 % (ref 11–15)
GLUCOSE BLD-MCNC: 111 MG/DL (ref 70–99)
GLUCOSE UR-MCNC: NEGATIVE MG/DL
HCT VFR BLD AUTO: 41.6 %
HGB BLD-MCNC: 12.9 G/DL
HGB UR QL STRIP.AUTO: NEGATIVE
IMM GRANULOCYTES # BLD AUTO: 0.06 X10(3) UL (ref 0–1)
IMM GRANULOCYTES NFR BLD: 0.7 %
KETONES UR-MCNC: NEGATIVE MG/DL
LYMPHOCYTES # BLD AUTO: 2.46 X10(3) UL (ref 1–4)
LYMPHOCYTES NFR BLD AUTO: 30.4 %
MCH RBC QN AUTO: 27.3 PG (ref 26–34)
MCHC RBC AUTO-ENTMCNC: 31 G/DL (ref 31–37)
MCV RBC AUTO: 87.9 FL
MONOCYTES # BLD AUTO: 0.76 X10(3) UL (ref 0.1–1)
MONOCYTES NFR BLD AUTO: 9.4 %
NEUTROPHILS # BLD AUTO: 4.66 X10 (3) UL (ref 1.5–7.7)
NEUTROPHILS # BLD AUTO: 4.66 X10(3) UL (ref 1.5–7.7)
NEUTROPHILS NFR BLD AUTO: 57.5 %
NITRITE UR QL STRIP.AUTO: NEGATIVE
OSMOLALITY SERPL CALC.SUM OF ELEC: 291 MOSM/KG (ref 275–295)
PH UR: 7 [PH] (ref 5–8)
PLATELET # BLD AUTO: 270 10(3)UL (ref 150–450)
POTASSIUM SERPL-SCNC: 3.7 MMOL/L (ref 3.5–5.1)
PROT UR-MCNC: NEGATIVE MG/DL
RBC # BLD AUTO: 4.73 X10(6)UL
SARS-COV-2 RNA RESP QL NAA+PROBE: NOT DETECTED
SODIUM SERPL-SCNC: 141 MMOL/L (ref 136–145)
SP GR UR STRIP: 1.01 (ref 1–1.03)
TROPONIN I SERPL-MCNC: <0.045 NG/ML (ref ?–0.04)
UROBILINOGEN UR STRIP-ACNC: <2
WBC # BLD AUTO: 8.1 X10(3) UL (ref 4–11)

## 2021-02-24 PROCEDURE — 71045 X-RAY EXAM CHEST 1 VIEW: CPT | Performed by: EMERGENCY MEDICINE

## 2021-02-24 PROCEDURE — 99284 EMERGENCY DEPT VISIT MOD MDM: CPT

## 2021-02-24 PROCEDURE — 84484 ASSAY OF TROPONIN QUANT: CPT | Performed by: EMERGENCY MEDICINE

## 2021-02-24 PROCEDURE — 80048 BASIC METABOLIC PNL TOTAL CA: CPT | Performed by: EMERGENCY MEDICINE

## 2021-02-24 PROCEDURE — 81025 URINE PREGNANCY TEST: CPT

## 2021-02-24 PROCEDURE — 85025 COMPLETE CBC W/AUTO DIFF WBC: CPT | Performed by: EMERGENCY MEDICINE

## 2021-02-24 PROCEDURE — 93005 ELECTROCARDIOGRAM TRACING: CPT

## 2021-02-24 PROCEDURE — 93010 ELECTROCARDIOGRAM REPORT: CPT | Performed by: EMERGENCY MEDICINE

## 2021-02-24 PROCEDURE — 36415 COLL VENOUS BLD VENIPUNCTURE: CPT

## 2021-02-24 PROCEDURE — 85379 FIBRIN DEGRADATION QUANT: CPT | Performed by: EMERGENCY MEDICINE

## 2021-02-24 PROCEDURE — 81001 URINALYSIS AUTO W/SCOPE: CPT | Performed by: EMERGENCY MEDICINE

## 2021-02-24 NOTE — TELEPHONE ENCOUNTER
Action Requested: Summary for Provider     []  Critical Lab, Recommendations Needed  [] Need Additional Advice  []   FYI    []   Need Orders  [] Need Medications Sent to Pharmacy  []  Other     SUMMARY: Pt reports heart fluttering early this morning 2am,

## 2021-02-24 NOTE — ED INITIAL ASSESSMENT (HPI)
PT came in for palpitations and chest tightness last night with SOB, pain to left shoulder and decreased sensation to left hand. Symptoms improved but continued to have \"fluttering\" in chest. Reports she had nausea.  RR even and nonlabored, speaking in fu Problem: Dysphagia (Adult)  Goal: *Acute Goals and Plan of Care (Insert Text)  Description: Dysphagia Present:   No    Recommendations:  Diet: Soft solids, thin liquid  Meds: Per patient preference  Aspiration Precautions     Patient will:  1. Participate in training and education related to continued aspiration risk, diet recs and compensatory strategies (goal met). Outcome: Resolved/Met    SPEECH LANGUAGE PATHOLOGY BEDSIDE SWALLOW EVALUATION AND DISCHARGE    Patient: Pamela Mota (46 y.o. male)  Date: 12/8/2020  Primary Diagnosis: Hypokalemia [E87.6]        Precautions: Aspiration  PLOF: Independent    ASSESSMENT :  Clinical beside swallow eval completed per MD orders. Pt A&Ox4. Functional communication. Intelligibility >90%. Reports his speech was slurred previously but is now at baseline. Cognitive-linguistic function appears intact. Oral-motor exam revealed pt with incomplete dentition; however, all other structures grossly intact for mastication and deglutition. Reports soft solid diet at baseline. Presented with thin liquid and solid trials. Exhibited mildly delayed mastication with otherwise adequate bolus cohesion, manipulation and A-P transit. Further exhibited + swallow timing/reflex and hyolaryngeal excursion. Pt able to manipulate and clear with 0 clinical s/s aspiration. Pt safe for soft solid, thin liquid diet. 0 formal ST needs for dysphagia indicated at this time. SLP educated pt on role of speech therapist in current setting with re: speech/swallow; verbalized comprehension. SLP available for re-evaluation if indicated by MD.     Thank you for this referral.   Kaleb Juarez, SLP     PLAN :  Recommendations and Planned Interventions:  No formal ST needs ID'd for dysphagia. Eval only. Discharge Recommendations: None     SUBJECTIVE:   Patient stated I'm most worried about my legs being so weak.     OBJECTIVE:     Past Medical History:   Diagnosis Date    Cancer ([facility]     Diabetes ([facility] [address] [facility]     FH: chemotherapy     H/O stem cell transplant ([facility] [address] [facility]     Hypertension     Multiple myeloma ([facility])     Neuropathy      Past Surgical History:   Procedure Laterality Date    HX CHOLECYSTECTOMY      HX KYPHOPLASTY       Home Situation:   Home Situation  Home Environment: Private residence  # Steps to Enter: 3  One/Two Story Residence: Two story  # of Interior Steps: 12  Interior Rails: Left  Lift Chair Available: No  Living Alone: No  Support Systems: Child(desiree)  Patient Expects to be Discharged to[de-identified] Private residence  Current DME Used/Available at Home: [address] prior to admission: Soft solids, thin liquid  Current Diet:  Soft solids, thin liquid      Cognitive and Communication Status:  Neurologic State: Alert  Orientation Level: Oriented X4  Cognition: Follows commands  Perception: Appears intact  Perseveration: No perseveration noted  Safety/Judgement: Awareness of environment, Good awareness of safety precautions  Oral Assessment:  Oral Assessment  Labial: No impairment  Dentition: Natural;Extractions  Oral Hygiene: Good  Lingual: No impairment  Velum: No impairment  Mandible: No impairment  P.O. Trials:  Patient Position: HOB 60  Vocal quality prior to P.O.: No impairment  Consistency Presented: Thin liquid;Puree; Solid  How Presented: Self-fed/presented;Straw;Successive swallows     Bolus Acceptance: No impairment  Bolus Formation/Control: Impaired  Type of Impairment: Delayed;Mastication  Propulsion: No impairment  Oral Residue: None  Initiation of Swallow: No impairment  Laryngeal Elevation: Functional  Aspiration Signs/Symptoms: None  Pharyngeal Phase Characteristics: No impairment, issues, or problems   Effective Modifications:  Alternate liquids/solids;Small sips and bites  Cues for Modifications: None       Oral Phase Severity: Minimal  Pharyngeal Phase Severity : No impairment    PAIN:  Pain level pre-treatment: 0/10   Pain level post-treatment: 0/10     After evaluation:   []            Patient left in no apparent distress sitting up in chair  [x]            Patient left in no apparent distress in bed  [x]            Call bell left within reach  [x]            Nursing notified  []            Family present  []            Caregiver present  []            Bed alarm activated      COMMUNICATION/EDUCATION:   [x]            Aspiration precautions; swallow safety; compensatory techniques. [x]            Patient/family have participated as able in goal setting and plan of care. []            Patient/family agree to work toward stated goals and plan of care. []            Patient understands intent and goals of therapy; neutral about participation. []            Patient unable to participate in goal setting/plan of care; educ ongoing with interdisciplinary staff  []         Posted safety precautions in patient's room.     Thank you for this referral.  Kaleb Juarez, SLP  Time Calculation: 11 mins

## 2021-02-25 NOTE — ED PROVIDER NOTES
Patient Seen in: [facility] AND [facility] Emergency Department      History   Patient presents with:  Arrythmia/Palpitations    Stated Complaint: Shortness of breath    HPI/Subjective:   HPI    The patient is a 27-year-old female with a history of PVCs after be Systems    Positive for stated complaint: Shortness of breath  Other systems are as noted in HPI. Constitutional and vital signs reviewed. All other systems reviewed and negative except as noted above.     Physical Exam     ED Triage Vitals [02/24/21 to person, place, and time. Deep Tendon Reflexes: Reflexes are normal and symmetric. Psychiatric:         Mood and Affect: Mood is anxious.          Judgment: Judgment normal.       Differential diagnosis includes PVCs, other arrhythmia, electrolyte Pulse Readings from Last 1 Encounters:  02/24/21 : 72  , sinus, normal    Radiology findings: Xr Chest Ap Portable  (cpt=71045)    Result Date: 2/24/2021  CONCLUSION: Normal examination.      Dictated by (CST): Hai Valdivia MD on 2/24/2021 at 6:4

## 2021-03-11 ENCOUNTER — NURSE ONLY (OUTPATIENT)
Dept: OBGYN CLINIC | Facility: CLINIC | Age: 36
End: 2021-03-11
Payer: COMMERCIAL

## 2021-03-11 VITALS — SYSTOLIC BLOOD PRESSURE: 106 MMHG | DIASTOLIC BLOOD PRESSURE: 72 MMHG

## 2021-03-11 DIAGNOSIS — Z30.42 DEPO-PROVERA CONTRACEPTIVE STATUS: Primary | ICD-10-CM

## 2021-03-11 LAB
CONTROL LINE PRESENT WITH A CLEAR BACKGROUND (YES/NO): YES YES/NO
PREGNANCY TEST, URINE: NEGATIVE

## 2021-03-11 PROCEDURE — 3074F SYST BP LT 130 MM HG: CPT | Performed by: OBSTETRICS & GYNECOLOGY

## 2021-03-11 PROCEDURE — 3078F DIAST BP <80 MM HG: CPT | Performed by: OBSTETRICS & GYNECOLOGY

## 2021-03-11 PROCEDURE — 96372 THER/PROPH/DIAG INJ SC/IM: CPT | Performed by: OBSTETRICS & GYNECOLOGY

## 2021-03-11 PROCEDURE — 81025 URINE PREGNANCY TEST: CPT | Performed by: OBSTETRICS & GYNECOLOGY

## 2021-03-11 RX ORDER — MEDROXYPROGESTERONE ACETATE 150 MG/ML
150 INJECTION, SUSPENSION INTRAMUSCULAR
Status: SHIPPED | OUTPATIENT
Start: 2021-03-11

## 2021-03-11 RX ADMIN — MEDROXYPROGESTERONE ACETATE 150 MG: 150 INJECTION, SUSPENSION INTRAMUSCULAR at 11:08:00

## 2021-03-11 NOTE — PROGRESS NOTES
Medroxyprogesterone 150 mg IM administered to the pt in the right deltoid. Pt tolerated injection well. No adverse effects. Next Depo window-May 27th-June 10th.

## 2021-03-12 DIAGNOSIS — Z23 NEED FOR VACCINATION: ICD-10-CM

## 2021-04-20 ENCOUNTER — OFFICE VISIT (OUTPATIENT)
Dept: SURGERY | Facility: CLINIC | Age: 36
End: 2021-04-20
Payer: COMMERCIAL

## 2021-04-20 VITALS
DIASTOLIC BLOOD PRESSURE: 75 MMHG | BODY MASS INDEX: 41.6 KG/M2 | HEIGHT: 69 IN | WEIGHT: 280.88 LBS | HEART RATE: 82 BPM | OXYGEN SATURATION: 96 % | SYSTOLIC BLOOD PRESSURE: 122 MMHG

## 2021-04-20 DIAGNOSIS — F43.9 STRESS: ICD-10-CM

## 2021-04-20 DIAGNOSIS — E66.01 MORBID OBESITY WITH BMI OF 40.0-44.9, ADULT (HCC): ICD-10-CM

## 2021-04-20 DIAGNOSIS — Z51.81 ENCOUNTER FOR THERAPEUTIC DRUG MONITORING: ICD-10-CM

## 2021-04-20 DIAGNOSIS — E78.00 HYPERCHOLESTEROLEMIA WITHOUT HYPERTRIGLYCERIDEMIA: Primary | ICD-10-CM

## 2021-04-20 PROCEDURE — 3074F SYST BP LT 130 MM HG: CPT | Performed by: INTERNAL MEDICINE

## 2021-04-20 PROCEDURE — 3078F DIAST BP <80 MM HG: CPT | Performed by: INTERNAL MEDICINE

## 2021-04-20 PROCEDURE — 99214 OFFICE O/P EST MOD 30 MIN: CPT | Performed by: INTERNAL MEDICINE

## 2021-04-20 PROCEDURE — 3008F BODY MASS INDEX DOCD: CPT | Performed by: INTERNAL MEDICINE

## 2021-04-20 RX ORDER — PHENTERMINE HYDROCHLORIDE 30 MG/1
30 CAPSULE ORAL EVERY MORNING
Qty: 30 CAPSULE | Refills: 2 | Status: SHIPPED | OUTPATIENT
Start: 2021-04-20 | End: 2021-07-19

## 2021-06-04 ENCOUNTER — NURSE ONLY (OUTPATIENT)
Dept: OBGYN CLINIC | Facility: CLINIC | Age: 36
End: 2021-06-04
Payer: COMMERCIAL

## 2021-06-04 PROCEDURE — 96372 THER/PROPH/DIAG INJ SC/IM: CPT | Performed by: OBSTETRICS & GYNECOLOGY

## 2021-06-04 RX ADMIN — MEDROXYPROGESTERONE ACETATE 150 MG: 150 INJECTION, SUSPENSION INTRAMUSCULAR at 10:35:00

## 2021-06-04 NOTE — PROGRESS NOTES
Patient name and  verified. Patient presents to clinic for depo 150 injection. Injection given in L deltoid. Patient tolerated well and no adverse reactions noted. Next depo window given to patient (2021-9/3/2021).

## 2021-07-18 DIAGNOSIS — E66.01 MORBID OBESITY WITH BMI OF 40.0-44.9, ADULT (HCC): ICD-10-CM

## 2021-07-19 RX ORDER — PHENTERMINE HYDROCHLORIDE 30 MG/1
30 CAPSULE ORAL EVERY MORNING
Qty: 30 CAPSULE | Refills: 0 | Status: SHIPPED | OUTPATIENT
Start: 2021-07-19 | End: 2021-09-23 | Stop reason: DRUGHIGH

## 2021-08-21 ENCOUNTER — NURSE ONLY (OUTPATIENT)
Dept: OBGYN CLINIC | Facility: CLINIC | Age: 36
End: 2021-08-21
Payer: COMMERCIAL

## 2021-08-21 DIAGNOSIS — Z30.42 DEPO-PROVERA CONTRACEPTIVE STATUS: Primary | ICD-10-CM

## 2021-08-21 LAB
CONTROL LINE PRESENT WITH A CLEAR BACKGROUND (YES/NO): YES YES/NO
KIT LOT #: NORMAL NUMERIC

## 2021-08-21 PROCEDURE — 96372 THER/PROPH/DIAG INJ SC/IM: CPT | Performed by: OBSTETRICS & GYNECOLOGY

## 2021-08-21 PROCEDURE — 81025 URINE PREGNANCY TEST: CPT | Performed by: OBSTETRICS & GYNECOLOGY

## 2021-08-21 RX ADMIN — MEDROXYPROGESTERONE ACETATE 150 MG: 150 INJECTION, SUSPENSION INTRAMUSCULAR at 10:03:00

## 2021-08-21 NOTE — PROGRESS NOTES
Depo provera 150 mg IM administered to the pt in the right deltoid. Pt tolerated well.  No adverse effects

## 2021-09-23 ENCOUNTER — OFFICE VISIT (OUTPATIENT)
Dept: SURGERY | Facility: CLINIC | Age: 36
End: 2021-09-23
Payer: COMMERCIAL

## 2021-09-23 VITALS
WEIGHT: 285.5 LBS | SYSTOLIC BLOOD PRESSURE: 113 MMHG | OXYGEN SATURATION: 96 % | DIASTOLIC BLOOD PRESSURE: 68 MMHG | BODY MASS INDEX: 42.29 KG/M2 | HEART RATE: 69 BPM | HEIGHT: 69 IN

## 2021-09-23 DIAGNOSIS — E66.01 MORBID OBESITY WITH BMI OF 40.0-44.9, ADULT (HCC): ICD-10-CM

## 2021-09-23 DIAGNOSIS — F43.9 STRESS: ICD-10-CM

## 2021-09-23 DIAGNOSIS — E78.00 HYPERCHOLESTEROLEMIA WITHOUT HYPERTRIGLYCERIDEMIA: Primary | ICD-10-CM

## 2021-09-23 DIAGNOSIS — Z51.81 ENCOUNTER FOR THERAPEUTIC DRUG MONITORING: ICD-10-CM

## 2021-09-23 PROCEDURE — 99214 OFFICE O/P EST MOD 30 MIN: CPT | Performed by: INTERNAL MEDICINE

## 2021-09-23 PROCEDURE — 3078F DIAST BP <80 MM HG: CPT | Performed by: INTERNAL MEDICINE

## 2021-09-23 PROCEDURE — 3074F SYST BP LT 130 MM HG: CPT | Performed by: INTERNAL MEDICINE

## 2021-09-23 PROCEDURE — 3008F BODY MASS INDEX DOCD: CPT | Performed by: INTERNAL MEDICINE

## 2021-09-23 RX ORDER — PHENTERMINE HYDROCHLORIDE 15 MG/1
15 CAPSULE ORAL EVERY MORNING
Qty: 30 CAPSULE | Refills: 2 | Status: SHIPPED | OUTPATIENT
Start: 2021-09-23 | End: 2022-01-25

## 2021-09-23 NOTE — PROGRESS NOTES
[address]  Dept: [phone]       Patient:  Mookie Noyola  :      11/3/1985  MRN:      [id number]    Chief Complaint:  Patient present Social History:  Social History    Socioeconomic History      Marital status:       Spouse name: Not on file      Number of children: Not on file      Years of education: Not on file      Highest education level: Not on file    Occupational Hist Organizations: Not on file      Attends Club or Organization Meetings: Not on file      Marital Status: Not on file  Intimate Partner Violence:       Fear of Current or Ex-Partner: Not on file      Emotionally Abused: Not on file      Physically Abused: No nutrition labels, Drink 64 oz of water per day, Maintain a daily food journal, No drinking 30 minutes before or after meals, Utlize portion control strategies to reduce calorie intake, Identify triggers for eating and manage cues and Eat slowly and take 20 Component Value Date/Time    CHOLEST 148 10/15/2020 10:29 AM    LDL 90 10/15/2020 10:29 AM    HDL 37 (L) 10/15/2020 10:29 AM    TRIG 103 10/15/2020 10:29 AM    VLDL 21 10/15/2020 10:29 AM       RAY: continue hctz 12.5 mg PRN    Goals for next month:  1.

## 2021-10-06 ENCOUNTER — TELEPHONE (OUTPATIENT)
Dept: OBGYN CLINIC | Facility: CLINIC | Age: 36
End: 2021-10-06

## 2021-10-06 NOTE — TELEPHONE ENCOUNTER
Pt would like to reschedule depo earlier by a couple of day, pt having surgery. Depo window is not noted.     Please advise

## 2021-10-13 PROBLEM — Z71.85 VACCINE COUNSELING: Status: ACTIVE | Noted: 2021-10-13

## 2021-10-14 ENCOUNTER — OFFICE VISIT (OUTPATIENT)
Dept: INTERNAL MEDICINE CLINIC | Facility: CLINIC | Age: 36
End: 2021-10-14
Payer: COMMERCIAL

## 2021-10-14 VITALS
HEIGHT: 69 IN | DIASTOLIC BLOOD PRESSURE: 69 MMHG | HEART RATE: 60 BPM | BODY MASS INDEX: 42.31 KG/M2 | OXYGEN SATURATION: 96 % | SYSTOLIC BLOOD PRESSURE: 106 MMHG | TEMPERATURE: 97 F | RESPIRATION RATE: 17 BRPM | WEIGHT: 285.63 LBS

## 2021-10-14 DIAGNOSIS — Z71.85 VACCINE COUNSELING: ICD-10-CM

## 2021-10-14 DIAGNOSIS — R31.29 OTHER MICROSCOPIC HEMATURIA: ICD-10-CM

## 2021-10-14 DIAGNOSIS — N93.8 DUB (DYSFUNCTIONAL UTERINE BLEEDING): ICD-10-CM

## 2021-10-14 DIAGNOSIS — Z00.00 ROUTINE GENERAL MEDICAL EXAMINATION AT A HEALTH CARE FACILITY: ICD-10-CM

## 2021-10-14 DIAGNOSIS — E78.00 HYPERCHOLESTEROLEMIA WITHOUT HYPERTRIGLYCERIDEMIA: Primary | ICD-10-CM

## 2021-10-14 PROCEDURE — 3044F HG A1C LEVEL LT 7.0%: CPT | Performed by: INTERNAL MEDICINE

## 2021-10-14 PROCEDURE — 3008F BODY MASS INDEX DOCD: CPT | Performed by: INTERNAL MEDICINE

## 2021-10-14 PROCEDURE — 3074F SYST BP LT 130 MM HG: CPT | Performed by: INTERNAL MEDICINE

## 2021-10-14 PROCEDURE — 90471 IMMUNIZATION ADMIN: CPT | Performed by: INTERNAL MEDICINE

## 2021-10-14 PROCEDURE — 90686 IIV4 VACC NO PRSV 0.5 ML IM: CPT | Performed by: INTERNAL MEDICINE

## 2021-10-14 PROCEDURE — 99395 PREV VISIT EST AGE 18-39: CPT | Performed by: INTERNAL MEDICINE

## 2021-10-14 PROCEDURE — 3078F DIAST BP <80 MM HG: CPT | Performed by: INTERNAL MEDICINE

## 2021-10-14 PROCEDURE — 81003 URINALYSIS AUTO W/O SCOPE: CPT | Performed by: INTERNAL MEDICINE

## 2021-10-14 PROCEDURE — 36415 COLL VENOUS BLD VENIPUNCTURE: CPT | Performed by: INTERNAL MEDICINE

## 2021-10-14 NOTE — PROGRESS NOTES
HPI:    Patient ID: Negro Vela is a 38year old female. Negro Vela is a 38year old female who presents for a complete physical exam.   HPI:   Patient presents with:  Physical: annual exam and pre op surgery for plantar fasciitis on righ foot. Mother    • Cancer Maternal Grandfather         stomach   • Breast Cancer Maternal Aunt    • Colon Cancer Paternal Aunt       Social History    Socioeconomic History      Marital status:     Tobacco Use      Smoking status: Never Smoker      Smokele examination at a health care facility  Vaccine counseling  Dub (dysfunctional uterine bleeding)    Plan   Orders:  Orders Placed This Encounter      Lipid Panel      CBC With Differential With Platelet      Comp Metabolic Panel (14)      TSH W Reflex To Fr 10:29 AM    TRIG 103 10/15/2020 10:29 AM    LDL 90 10/15/2020 10:29 AM    NONHDLC 111 10/15/2020 10:29 AM       Lab Results   Component Value Date/Time    A1C 6.0 11/23/2014 11:39 AM      No results found for: VITD      No recommendations at this time    C use: Yes        Alcohol/week: 0.0 standard drinks        Comment: Socially; rarely      Drug use: No      Sexual activity: Yes        Birth control/protection: Injection    Other Topics      Concerns:        Caffeine Concern: Yes          Soda        OB Hi Cardiovascular: Negative for chest pain, palpitations and leg swelling. Gastrointestinal: Negative for abdominal distention, abdominal pain, anal bleeding, blood in stool, constipation, diarrhea, nausea, rectal pain and vomiting.    Endocrine: Negative [facility] at [facility].        Past Surgical History:   Procedure Laterality Date   • EGD  8/11   • OTHER SURGICAL HISTORY      Bartholin's cyst surgery   • OTHER SURGICAL HISTORY  8/1/2011    Excision of left sebacious cyst      Family History   Problem Relation Age not injected, scarred, perforated, erythematous, retracted or bulging. Tympanic membrane has normal mobility. Left Ear: Tympanic membrane, ear canal and external ear normal. No decreased hearing noted. No laceration, drainage, swelling or tenderness. and 2+ on the left side. Dorsalis pedis pulses are 2+ on the right side and 2+ on the left side. Posterior tibial pulses are 2+ on the right side and 2+ on the left side.       Heart sounds: Normal heart sounds, S1 normal and S2 normal.   Pulm oriented to person, place, and time. Psychiatric:         Mood and Affect: Mood normal.         Speech: Speech normal.         Behavior: Behavior normal. Behavior is cooperative.               ASSESSMENT/PLAN:   Hypercholesterolemia without hypertriglycer

## 2021-10-14 NOTE — PATIENT INSTRUCTIONS
ASSESSMENT/PLAN:   Hypercholesterolemia without hypertriglyceridemia  (primary encounter diagnosis) Check blood. Routine general medical examination at a health care facility Check urine. Vaccine counseling Flu shot today.      Dub (dysfunctional ut

## 2021-10-15 PROBLEM — E11.65 UNCONTROLLED TYPE 2 DIABETES MELLITUS WITH HYPERGLYCEMIA (HCC): Status: ACTIVE | Noted: 2021-10-15

## 2021-10-15 PROBLEM — E55.9 VITAMIN D DEFICIENCY: Status: ACTIVE | Noted: 2021-10-15

## 2021-11-02 ENCOUNTER — TELEPHONE (OUTPATIENT)
Dept: INTERNAL MEDICINE CLINIC | Facility: CLINIC | Age: 36
End: 2021-11-02

## 2021-11-02 ENCOUNTER — MED REC SCAN ONLY (OUTPATIENT)
Dept: INTERNAL MEDICINE CLINIC | Facility: CLINIC | Age: 36
End: 2021-11-02

## 2021-11-02 NOTE — TELEPHONE ENCOUNTER
Needs pre op. For surgery asap prior to 11-8-21. Can she see [facility]?

## 2021-11-03 ENCOUNTER — EKG ENCOUNTER (OUTPATIENT)
Dept: LAB | Facility: HOSPITAL | Age: 36
End: 2021-11-03
Attending: NURSE PRACTITIONER
Payer: COMMERCIAL

## 2021-11-03 ENCOUNTER — TELEPHONE (OUTPATIENT)
Dept: INTERNAL MEDICINE CLINIC | Facility: CLINIC | Age: 36
End: 2021-11-03

## 2021-11-03 ENCOUNTER — HOSPITAL ENCOUNTER (OUTPATIENT)
Dept: GENERAL RADIOLOGY | Facility: HOSPITAL | Age: 36
Discharge: HOME | End: 2021-11-03
Attending: NURSE PRACTITIONER
Payer: COMMERCIAL

## 2021-11-03 ENCOUNTER — OFFICE VISIT (OUTPATIENT)
Dept: INTERNAL MEDICINE CLINIC | Facility: CLINIC | Age: 36
End: 2021-11-03
Payer: COMMERCIAL

## 2021-11-03 VITALS
SYSTOLIC BLOOD PRESSURE: 110 MMHG | HEIGHT: 69 IN | WEIGHT: 284.63 LBS | HEART RATE: 64 BPM | DIASTOLIC BLOOD PRESSURE: 70 MMHG | BODY MASS INDEX: 42.16 KG/M2

## 2021-11-03 DIAGNOSIS — Z01.818 PREOP EXAMINATION: ICD-10-CM

## 2021-11-03 DIAGNOSIS — E11.65 UNCONTROLLED TYPE 2 DIABETES MELLITUS WITH HYPERGLYCEMIA (HCC): Primary | ICD-10-CM

## 2021-11-03 DIAGNOSIS — I49.3 PVC (PREMATURE VENTRICULAR CONTRACTION): ICD-10-CM

## 2021-11-03 DIAGNOSIS — E55.9 VITAMIN D DEFICIENCY: ICD-10-CM

## 2021-11-03 DIAGNOSIS — E78.00 HYPERCHOLESTEROLEMIA WITHOUT HYPERTRIGLYCERIDEMIA: ICD-10-CM

## 2021-11-03 DIAGNOSIS — E11.65 UNCONTROLLED TYPE 2 DIABETES MELLITUS WITH HYPERGLYCEMIA (HCC): ICD-10-CM

## 2021-11-03 DIAGNOSIS — K58.9 IRRITABLE BOWEL SYNDROME, UNSPECIFIED TYPE: ICD-10-CM

## 2021-11-03 PROCEDURE — 82043 UR ALBUMIN QUANTITATIVE: CPT

## 2021-11-03 PROCEDURE — 3044F HG A1C LEVEL LT 7.0%: CPT | Performed by: NURSE PRACTITIONER

## 2021-11-03 PROCEDURE — 93010 ELECTROCARDIOGRAM REPORT: CPT | Performed by: NURSE PRACTITIONER

## 2021-11-03 PROCEDURE — 36415 COLL VENOUS BLD VENIPUNCTURE: CPT | Performed by: NURSE PRACTITIONER

## 2021-11-03 PROCEDURE — 71046 X-RAY EXAM CHEST 2 VIEWS: CPT | Performed by: NURSE PRACTITIONER

## 2021-11-03 PROCEDURE — 3008F BODY MASS INDEX DOCD: CPT | Performed by: NURSE PRACTITIONER

## 2021-11-03 PROCEDURE — 99214 OFFICE O/P EST MOD 30 MIN: CPT | Performed by: NURSE PRACTITIONER

## 2021-11-03 PROCEDURE — 83036 HEMOGLOBIN GLYCOSYLATED A1C: CPT

## 2021-11-03 PROCEDURE — 82570 ASSAY OF URINE CREATININE: CPT

## 2021-11-03 PROCEDURE — 3074F SYST BP LT 130 MM HG: CPT | Performed by: NURSE PRACTITIONER

## 2021-11-03 PROCEDURE — 3061F NEG MICROALBUMINURIA REV: CPT | Performed by: NURSE PRACTITIONER

## 2021-11-03 PROCEDURE — 93005 ELECTROCARDIOGRAM TRACING: CPT

## 2021-11-03 PROCEDURE — 85730 THROMBOPLASTIN TIME PARTIAL: CPT

## 2021-11-03 PROCEDURE — 3078F DIAST BP <80 MM HG: CPT | Performed by: NURSE PRACTITIONER

## 2021-11-03 PROCEDURE — 85610 PROTHROMBIN TIME: CPT | Performed by: NURSE PRACTITIONER

## 2021-11-03 NOTE — PROGRESS NOTES
Dalia Calvert is a 39year old female who presents for a pre-operative physical exam. Patient is to have plantar fasciotomy R foot, to be done by Dr. Maryana Zamudio at [facility] in [facility] on 11/8/2021.     Pt has had previous anesthesia: Lancets Does not apply Misc Dx. E11.65. Checks qam. 50 each 11   • Blood Glucose Monitoring Suppl (BLOOD GLUCOSE SYSTEM MEHUL) Does not apply Kit DX E 11.22. 1 kit 0   • Phentermine HCl 15 MG Oral Cap Take 1 capsule (15 mg total) by mouth every morning.  30 c control/protection: Injection    Other Topics      Concerns:        Caffeine Concern: Yes          Soda        REVIEW OF SYSTEMS:   GENERAL: feels well otherwise  SKIN: denies any unusual skin lesions  EYES:denies blurred vision or double vision  HEENT: de When compared with ECG of 02/24/2021 17:04:41   No significant changes have occurred   Electronically signed on 11/03/2021 at 17:20 by Greg Waters M.D.     Per ACC/ AHA guidelines, this patient may proceed to surgery without further risk stratification

## 2021-11-03 NOTE — TELEPHONE ENCOUNTER
Fidelina Coto from Dr. Floridalma Sharif office/[facility] is requesting pre op lab results and office notes faxed.       Dr. Baltazar Ruby  Fax [phone]

## 2021-11-04 ENCOUNTER — TELEPHONE (OUTPATIENT)
Dept: INTERNAL MEDICINE CLINIC | Facility: CLINIC | Age: 36
End: 2021-11-04

## 2021-11-04 NOTE — TELEPHONE ENCOUNTER
Please fax preop notes, labs, EKG, and chest xray to [facility] and Dr. Rand Knight office.   [phone] Fax number w/ MA

## 2021-11-04 NOTE — TELEPHONE ENCOUNTER
Hector John from pre op at [facility] to request pt most current CBC.     10/14/21 CBC results faxed to [phone] Per Mimi's request.

## 2021-11-06 ENCOUNTER — NURSE ONLY (OUTPATIENT)
Dept: OBGYN CLINIC | Facility: CLINIC | Age: 36
End: 2021-11-06
Payer: COMMERCIAL

## 2021-11-06 VITALS — DIASTOLIC BLOOD PRESSURE: 74 MMHG | SYSTOLIC BLOOD PRESSURE: 114 MMHG

## 2021-11-06 DIAGNOSIS — Z30.42 ENCOUNTER FOR DEPO-PROVERA CONTRACEPTION: Primary | ICD-10-CM

## 2021-11-06 PROCEDURE — 3078F DIAST BP <80 MM HG: CPT | Performed by: OBSTETRICS & GYNECOLOGY

## 2021-11-06 PROCEDURE — 96372 THER/PROPH/DIAG INJ SC/IM: CPT | Performed by: OBSTETRICS & GYNECOLOGY

## 2021-11-06 PROCEDURE — 3074F SYST BP LT 130 MM HG: CPT | Performed by: OBSTETRICS & GYNECOLOGY

## 2021-11-06 RX ADMIN — MEDROXYPROGESTERONE ACETATE 150 MG: 150 INJECTION, SUSPENSION INTRAMUSCULAR at 10:35:00

## 2021-11-06 NOTE — PROGRESS NOTES
/74 (BP Location: Left arm, Patient Position: Sitting, Cuff Size: large)     Pt here for Depo 160mg IM injection. Given in right deltoid muscle. Pt tolerated well. No signs of adverse reaction noted.   Next window for depo given 01/22/22-02/05/22

## 2022-01-25 ENCOUNTER — OFFICE VISIT (OUTPATIENT)
Dept: SURGERY | Facility: CLINIC | Age: 37
End: 2022-01-25
Payer: COMMERCIAL

## 2022-01-25 ENCOUNTER — NURSE ONLY (OUTPATIENT)
Dept: OBGYN CLINIC | Facility: CLINIC | Age: 37
End: 2022-01-25
Payer: COMMERCIAL

## 2022-01-25 VITALS
WEIGHT: 280.31 LBS | HEART RATE: 75 BPM | BODY MASS INDEX: 41.52 KG/M2 | OXYGEN SATURATION: 95 % | DIASTOLIC BLOOD PRESSURE: 70 MMHG | HEIGHT: 69 IN | SYSTOLIC BLOOD PRESSURE: 113 MMHG

## 2022-01-25 DIAGNOSIS — F43.9 STRESS: ICD-10-CM

## 2022-01-25 DIAGNOSIS — Z51.81 ENCOUNTER FOR THERAPEUTIC DRUG MONITORING: ICD-10-CM

## 2022-01-25 DIAGNOSIS — E66.01 MORBID OBESITY WITH BMI OF 40.0-44.9, ADULT (HCC): ICD-10-CM

## 2022-01-25 DIAGNOSIS — R73.03 PRE-DIABETES: ICD-10-CM

## 2022-01-25 DIAGNOSIS — E78.00 HYPERCHOLESTEROLEMIA WITHOUT HYPERTRIGLYCERIDEMIA: Primary | ICD-10-CM

## 2022-01-25 PROCEDURE — 3074F SYST BP LT 130 MM HG: CPT | Performed by: INTERNAL MEDICINE

## 2022-01-25 PROCEDURE — 3008F BODY MASS INDEX DOCD: CPT | Performed by: INTERNAL MEDICINE

## 2022-01-25 PROCEDURE — 3078F DIAST BP <80 MM HG: CPT | Performed by: INTERNAL MEDICINE

## 2022-01-25 PROCEDURE — 96372 THER/PROPH/DIAG INJ SC/IM: CPT | Performed by: OBSTETRICS & GYNECOLOGY

## 2022-01-25 PROCEDURE — 99214 OFFICE O/P EST MOD 30 MIN: CPT | Performed by: INTERNAL MEDICINE

## 2022-01-25 RX ORDER — PHENTERMINE HYDROCHLORIDE 15 MG/1
15 CAPSULE ORAL EVERY MORNING
Qty: 30 CAPSULE | Refills: 2 | Status: SHIPPED | OUTPATIENT
Start: 2022-01-25

## 2022-01-25 RX ADMIN — MEDROXYPROGESTERONE ACETATE 150 MG: 150 INJECTION, SUSPENSION INTRAMUSCULAR at 14:49:00

## 2022-01-25 NOTE — PROGRESS NOTES
Patient name and  verified. Patient presents to office for depo 150 mg injection. Injection given in L deltoid. Patient tolerated injection well and no adverse reactions noted. Next depo window given to patient -2022.

## 2022-01-25 NOTE — PROGRESS NOTES
[address] [facility] [address]  [facility]  Dept: [phone]       Patient:  Balbina Griffin  :      11/3/1985  MRN:      [id number]    Chief Complaint:  Patient present qam. 50 each 11   • Lancets Does not apply Misc Dx. E11.65.  Checks qam. 50 each 11   • Blood Glucose Monitoring Suppl (BLOOD GLUCOSE SYSTEM MEHUL) Does not apply Kit DX E 11.22. 1 kit 0   • hydrochlorothiazide 12.5 MG Oral Cap Take 1 capsule (12.5 mg total) Date   • EGD  8/11   • OTHER SURGICAL HISTORY      Bartholin's cyst surgery   • OTHER SURGICAL HISTORY  8/1/2011    Excision of left sebacious cyst     Family History:    Family History   Problem Relation Age of Onset   • Diabetes Father    • Hypertension negative  All other systems were reviewed and are negative    Physical Exam:   General appearance: alert, appears stated age, cooperative and moderately obese  Head: Normocephalic, without obvious abnormality, atraumatic  Eyes: conjunctivae/corneas clear. Tolerating 15 mg  Higher dose gave headaches  ekg done      Did not tolerate topiramate    Refer to [facility]    hctz PRN    Diagnoses and all orders for this visit:    Hypercholesterolemia without hypertriglyceridemia    Stress    Encounter for thera

## 2022-02-05 ENCOUNTER — OFFICE VISIT (OUTPATIENT)
Dept: OBGYN CLINIC | Facility: CLINIC | Age: 37
End: 2022-02-05
Payer: COMMERCIAL

## 2022-02-05 VITALS — BODY MASS INDEX: 41 KG/M2 | WEIGHT: 275 LBS

## 2022-02-05 DIAGNOSIS — N75.1 BARTHOLIN'S GLAND ABSCESS: ICD-10-CM

## 2022-02-05 DIAGNOSIS — N89.8 VAGINAL DISCHARGE: Primary | ICD-10-CM

## 2022-02-05 PROCEDURE — 99213 OFFICE O/P EST LOW 20 MIN: CPT | Performed by: OBSTETRICS & GYNECOLOGY

## 2022-02-05 RX ORDER — MELOXICAM 15 MG/1
TABLET ORAL
COMMUNITY
Start: 2021-12-27

## 2022-02-05 RX ORDER — DOXYCYCLINE HYCLATE 100 MG
100 TABLET ORAL 2 TIMES DAILY
Qty: 28 TABLET | Refills: 0 | Status: SHIPPED | OUTPATIENT
Start: 2022-02-05 | End: 2022-02-19

## 2022-02-07 LAB
GENITAL VAGINOSIS SCREEN: NEGATIVE
TRICHOMONAS SCREEN: NEGATIVE

## 2022-04-14 ENCOUNTER — NURSE ONLY (OUTPATIENT)
Dept: OBGYN CLINIC | Facility: CLINIC | Age: 37
End: 2022-04-14
Payer: COMMERCIAL

## 2022-04-14 VITALS — SYSTOLIC BLOOD PRESSURE: 116 MMHG | DIASTOLIC BLOOD PRESSURE: 78 MMHG

## 2022-04-14 DIAGNOSIS — Z30.42 ENCOUNTER FOR DEPO-PROVERA CONTRACEPTION: Primary | ICD-10-CM

## 2022-04-14 LAB
CONTROL LINE PRESENT WITH A CLEAR BACKGROUND (YES/NO): YES YES/NO
KIT LOT #: NORMAL NUMERIC
PREGNANCY TEST, URINE: NEGATIVE

## 2022-04-14 PROCEDURE — 81025 URINE PREGNANCY TEST: CPT | Performed by: OBSTETRICS & GYNECOLOGY

## 2022-04-14 PROCEDURE — 96372 THER/PROPH/DIAG INJ SC/IM: CPT | Performed by: OBSTETRICS & GYNECOLOGY

## 2022-04-14 RX ADMIN — MEDROXYPROGESTERONE ACETATE 150 MG: 150 INJECTION, SUSPENSION INTRAMUSCULAR at 09:58:00

## 2022-04-26 ENCOUNTER — OFFICE VISIT (OUTPATIENT)
Dept: SURGERY | Facility: CLINIC | Age: 37
End: 2022-04-26
Payer: COMMERCIAL

## 2022-04-26 VITALS
WEIGHT: 268 LBS | BODY MASS INDEX: 39.69 KG/M2 | OXYGEN SATURATION: 98 % | DIASTOLIC BLOOD PRESSURE: 66 MMHG | HEIGHT: 69 IN | SYSTOLIC BLOOD PRESSURE: 106 MMHG | HEART RATE: 70 BPM

## 2022-04-26 DIAGNOSIS — F43.9 STRESS: ICD-10-CM

## 2022-04-26 DIAGNOSIS — R73.03 PRE-DIABETES: ICD-10-CM

## 2022-04-26 DIAGNOSIS — E78.00 HYPERCHOLESTEROLEMIA WITHOUT HYPERTRIGLYCERIDEMIA: Primary | ICD-10-CM

## 2022-04-26 DIAGNOSIS — E66.9 OBESITY (BMI 30-39.9): ICD-10-CM

## 2022-04-26 DIAGNOSIS — Z51.81 ENCOUNTER FOR THERAPEUTIC DRUG MONITORING: ICD-10-CM

## 2022-04-26 DIAGNOSIS — E66.01 MORBID OBESITY WITH BMI OF 40.0-44.9, ADULT (HCC): ICD-10-CM

## 2022-04-26 PROCEDURE — 3078F DIAST BP <80 MM HG: CPT | Performed by: INTERNAL MEDICINE

## 2022-04-26 PROCEDURE — 3074F SYST BP LT 130 MM HG: CPT | Performed by: INTERNAL MEDICINE

## 2022-04-26 PROCEDURE — 99214 OFFICE O/P EST MOD 30 MIN: CPT | Performed by: INTERNAL MEDICINE

## 2022-04-26 PROCEDURE — 3008F BODY MASS INDEX DOCD: CPT | Performed by: INTERNAL MEDICINE

## 2022-04-26 RX ORDER — PHENTERMINE HYDROCHLORIDE 15 MG/1
15 CAPSULE ORAL EVERY MORNING
Qty: 30 CAPSULE | Refills: 2 | Status: SHIPPED | OUTPATIENT
Start: 2022-04-26

## 2022-05-16 ENCOUNTER — HOSPITAL ENCOUNTER (OUTPATIENT)
Age: 37
Discharge: HOME | End: 2022-05-16
Payer: COMMERCIAL

## 2022-05-16 ENCOUNTER — NURSE TRIAGE (OUTPATIENT)
Dept: INTERNAL MEDICINE CLINIC | Facility: CLINIC | Age: 37
End: 2022-05-16

## 2022-05-16 VITALS
SYSTOLIC BLOOD PRESSURE: 135 MMHG | OXYGEN SATURATION: 100 % | DIASTOLIC BLOOD PRESSURE: 81 MMHG | HEART RATE: 62 BPM | TEMPERATURE: 98 F | WEIGHT: 269 LBS | RESPIRATION RATE: 18 BRPM | BODY MASS INDEX: 39.84 KG/M2 | HEIGHT: 69 IN

## 2022-05-16 DIAGNOSIS — J06.9 VIRAL URI WITH COUGH: Primary | ICD-10-CM

## 2022-05-16 PROCEDURE — 99213 OFFICE O/P EST LOW 20 MIN: CPT | Performed by: PHYSICIAN ASSISTANT

## 2022-05-16 RX ORDER — ALBUTEROL SULFATE 90 UG/1
2 AEROSOL, METERED RESPIRATORY (INHALATION) EVERY 4 HOURS PRN
Qty: 1 EACH | Refills: 0 | Status: SHIPPED | OUTPATIENT
Start: 2022-05-16 | End: 2022-06-15

## 2022-05-16 RX ORDER — BENZONATATE 100 MG/1
100 CAPSULE ORAL 3 TIMES DAILY PRN
Qty: 21 CAPSULE | Refills: 0 | Status: SHIPPED | OUTPATIENT
Start: 2022-05-16 | End: 2022-05-23

## 2022-05-16 NOTE — ED INITIAL ASSESSMENT (HPI)
Pt reports productive cough with green sputum, nasal congestion. Zyrtec and tylenol taken. Pt states when laying on her side, she starts wheezing. C/o swollen eyelids.  covid positive in [place]

## 2022-07-01 ENCOUNTER — NURSE ONLY (OUTPATIENT)
Dept: OBGYN CLINIC | Facility: CLINIC | Age: 37
End: 2022-07-01
Payer: COMMERCIAL

## 2022-07-01 VITALS — DIASTOLIC BLOOD PRESSURE: 72 MMHG | SYSTOLIC BLOOD PRESSURE: 112 MMHG

## 2022-07-01 DIAGNOSIS — Z30.42 DEPO-PROVERA CONTRACEPTIVE STATUS: Primary | ICD-10-CM

## 2022-07-01 PROCEDURE — 3074F SYST BP LT 130 MM HG: CPT | Performed by: OBSTETRICS & GYNECOLOGY

## 2022-07-01 PROCEDURE — 3078F DIAST BP <80 MM HG: CPT | Performed by: OBSTETRICS & GYNECOLOGY

## 2022-07-01 PROCEDURE — 96372 THER/PROPH/DIAG INJ SC/IM: CPT | Performed by: OBSTETRICS & GYNECOLOGY

## 2022-07-01 RX ADMIN — MEDROXYPROGESTERONE ACETATE 150 MG: 150 INJECTION, SUSPENSION INTRAMUSCULAR at 10:23:00

## 2022-07-01 NOTE — PROGRESS NOTES
Pt of Dr. Amarjit Macias here for on-time Depo 150 mg injection. Vitals WNL. Injection well tolerated. No adverse reaction noted.   Last annual: 07/21/20  Last depo injection: April 14,2022  Pt advised to return for next injection: September 16th through 30th 2022

## 2022-07-11 ENCOUNTER — APPOINTMENT (OUTPATIENT)
Dept: URBAN - METROPOLITAN AREA CLINIC 244 | Age: 37
Setting detail: DERMATOLOGY
End: 2022-07-12

## 2022-07-11 DIAGNOSIS — L65.0 TELOGEN EFFLUVIUM: ICD-10-CM

## 2022-07-11 DIAGNOSIS — L81.4 OTHER MELANIN HYPERPIGMENTATION: ICD-10-CM

## 2022-07-11 DIAGNOSIS — D22 MELANOCYTIC NEVI: ICD-10-CM

## 2022-07-11 DIAGNOSIS — L73.8 OTHER SPECIFIED FOLLICULAR DISORDERS: ICD-10-CM

## 2022-07-11 PROBLEM — D22.5 MELANOCYTIC NEVI OF TRUNK: Status: ACTIVE | Noted: 2022-07-11

## 2022-07-11 PROBLEM — D23.39 OTHER BENIGN NEOPLASM OF SKIN OF OTHER PARTS OF FACE: Status: ACTIVE | Noted: 2022-07-11

## 2022-07-11 PROCEDURE — OTHER COUNSELING: OTHER

## 2022-07-11 PROCEDURE — 99203 OFFICE O/P NEW LOW 30 MIN: CPT

## 2022-07-11 PROCEDURE — OTHER ADDITIONAL NOTES: OTHER

## 2022-07-11 ASSESSMENT — LOCATION DETAILED DESCRIPTION DERM
LOCATION DETAILED: RIGHT INFERIOR CENTRAL MALAR CHEEK
LOCATION DETAILED: MID-FRONTAL SCALP
LOCATION DETAILED: RIGHT SUPERIOR MEDIAL UPPER BACK
LOCATION DETAILED: LEFT CENTRAL MALAR CHEEK
LOCATION DETAILED: LEFT MEDIAL UPPER BACK

## 2022-07-11 ASSESSMENT — LOCATION SIMPLE DESCRIPTION DERM
LOCATION SIMPLE: LEFT CHEEK
LOCATION SIMPLE: RIGHT CHEEK
LOCATION SIMPLE: LEFT UPPER BACK
LOCATION SIMPLE: RIGHT UPPER BACK
LOCATION SIMPLE: ANTERIOR SCALP

## 2022-07-11 ASSESSMENT — LOCATION ZONE DERM
LOCATION ZONE: SCALP
LOCATION ZONE: TRUNK
LOCATION ZONE: FACE

## 2022-07-11 NOTE — PROCEDURE: ADDITIONAL NOTES
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
Detail Level: Detailed
Render Risk Assessment In Note?: no

## 2022-07-12 ENCOUNTER — OFFICE VISIT (OUTPATIENT)
Dept: SURGERY | Facility: CLINIC | Age: 37
End: 2022-07-12
Payer: COMMERCIAL

## 2022-07-12 VITALS
HEART RATE: 70 BPM | DIASTOLIC BLOOD PRESSURE: 75 MMHG | SYSTOLIC BLOOD PRESSURE: 115 MMHG | OXYGEN SATURATION: 95 % | HEIGHT: 69 IN | BODY MASS INDEX: 39.92 KG/M2 | WEIGHT: 269.5 LBS

## 2022-07-12 DIAGNOSIS — E66.9 OBESITY (BMI 30-39.9): ICD-10-CM

## 2022-07-12 DIAGNOSIS — F43.9 STRESS: ICD-10-CM

## 2022-07-12 DIAGNOSIS — E78.00 HYPERCHOLESTEROLEMIA WITHOUT HYPERTRIGLYCERIDEMIA: Primary | ICD-10-CM

## 2022-07-12 DIAGNOSIS — E66.01 MORBID OBESITY WITH BMI OF 40.0-44.9, ADULT (HCC): ICD-10-CM

## 2022-07-12 DIAGNOSIS — R73.03 PRE-DIABETES: ICD-10-CM

## 2022-07-12 DIAGNOSIS — Z51.81 ENCOUNTER FOR THERAPEUTIC DRUG MONITORING: ICD-10-CM

## 2022-07-12 PROCEDURE — 3074F SYST BP LT 130 MM HG: CPT | Performed by: INTERNAL MEDICINE

## 2022-07-12 PROCEDURE — 3078F DIAST BP <80 MM HG: CPT | Performed by: INTERNAL MEDICINE

## 2022-07-12 PROCEDURE — 99214 OFFICE O/P EST MOD 30 MIN: CPT | Performed by: INTERNAL MEDICINE

## 2022-07-12 PROCEDURE — 3008F BODY MASS INDEX DOCD: CPT | Performed by: INTERNAL MEDICINE

## 2022-07-12 RX ORDER — METFORMIN HYDROCHLORIDE 500 MG/1
500 TABLET, EXTENDED RELEASE ORAL DAILY
Qty: 30 TABLET | Refills: 2 | Status: SHIPPED | OUTPATIENT
Start: 2022-07-12

## 2022-07-12 RX ORDER — PHENTERMINE HYDROCHLORIDE 15 MG/1
15 CAPSULE ORAL EVERY MORNING
Qty: 30 CAPSULE | Refills: 2 | Status: SHIPPED | OUTPATIENT
Start: 2022-07-12

## 2022-09-16 ENCOUNTER — NURSE ONLY (OUTPATIENT)
Dept: OBGYN CLINIC | Facility: CLINIC | Age: 37
End: 2022-09-16
Payer: COMMERCIAL

## 2022-09-16 VITALS — SYSTOLIC BLOOD PRESSURE: 122 MMHG | DIASTOLIC BLOOD PRESSURE: 86 MMHG

## 2022-09-16 DIAGNOSIS — Z30.42 DEPO-PROVERA CONTRACEPTIVE STATUS: Primary | ICD-10-CM

## 2022-09-16 PROCEDURE — 81025 URINE PREGNANCY TEST: CPT | Performed by: NURSE PRACTITIONER

## 2022-09-16 RX ADMIN — MEDROXYPROGESTERONE ACETATE 150 MG: 150 INJECTION, SUSPENSION INTRAMUSCULAR at 15:00:00

## 2022-09-21 ENCOUNTER — PATIENT MESSAGE (OUTPATIENT)
Dept: INTERNAL MEDICINE CLINIC | Facility: CLINIC | Age: 37
End: 2022-09-21

## 2022-09-21 DIAGNOSIS — E55.9 VITAMIN D DEFICIENCY: ICD-10-CM

## 2022-09-21 DIAGNOSIS — E11.65 UNCONTROLLED TYPE 2 DIABETES MELLITUS WITH HYPERGLYCEMIA (HCC): Primary | ICD-10-CM

## 2022-09-21 PROBLEM — R73.03 PRE-DIABETES: Status: RESOLVED | Noted: 2022-01-25 | Resolved: 2022-09-21

## 2022-10-05 ENCOUNTER — LAB ENCOUNTER (OUTPATIENT)
Dept: LAB | Facility: HOSPITAL | Age: 37
End: 2022-10-05
Attending: INTERNAL MEDICINE
Payer: COMMERCIAL

## 2022-10-05 DIAGNOSIS — E55.9 VITAMIN D DEFICIENCY: ICD-10-CM

## 2022-10-05 DIAGNOSIS — E11.65 UNCONTROLLED TYPE 2 DIABETES MELLITUS WITH HYPERGLYCEMIA (HCC): ICD-10-CM

## 2022-10-05 DIAGNOSIS — E78.00 HYPERCHOLESTEROLEMIA WITHOUT HYPERTRIGLYCERIDEMIA: ICD-10-CM

## 2022-10-05 LAB
ALBUMIN SERPL-MCNC: 3.6 G/DL (ref 3.4–5)
ALBUMIN/GLOB SERPL: 1 {RATIO} (ref 1–2)
ALP LIVER SERPL-CCNC: 59 U/L
ALT SERPL-CCNC: 24 U/L
ANION GAP SERPL CALC-SCNC: 7 MMOL/L (ref 0–18)
AST SERPL-CCNC: 12 U/L (ref 15–37)
BILIRUB SERPL-MCNC: 0.5 MG/DL (ref 0.1–2)
BUN BLD-MCNC: 7 MG/DL (ref 7–18)
BUN/CREAT SERPL: 11.3 (ref 10–20)
CALCIUM BLD-MCNC: 9 MG/DL (ref 8.5–10.1)
CHLORIDE SERPL-SCNC: 110 MMOL/L (ref 98–112)
CHOLEST SERPL-MCNC: 172 MG/DL (ref ?–200)
CO2 SERPL-SCNC: 25 MMOL/L (ref 21–32)
CREAT BLD-MCNC: 0.62 MG/DL
EST. AVERAGE GLUCOSE BLD GHB EST-MCNC: 120 MG/DL (ref 68–126)
FASTING PATIENT LIPID ANSWER: YES
FASTING STATUS PATIENT QL REPORTED: YES
GFR SERPLBLD BASED ON 1.73 SQ M-ARVRAT: 118 ML/MIN/1.73M2 (ref 60–?)
GLOBULIN PLAS-MCNC: 3.7 G/DL (ref 2.8–4.4)
GLUCOSE BLD-MCNC: 110 MG/DL (ref 70–99)
HBA1C MFR BLD: 5.8 % (ref ?–5.7)
HDLC SERPL-MCNC: 42 MG/DL (ref 40–59)
LDLC SERPL CALC-MCNC: 113 MG/DL (ref ?–100)
NONHDLC SERPL-MCNC: 130 MG/DL (ref ?–130)
OSMOLALITY SERPL CALC.SUM OF ELEC: 293 MOSM/KG (ref 275–295)
POTASSIUM SERPL-SCNC: 3.8 MMOL/L (ref 3.5–5.1)
PROT SERPL-MCNC: 7.3 G/DL (ref 6.4–8.2)
SODIUM SERPL-SCNC: 142 MMOL/L (ref 136–145)
TRIGL SERPL-MCNC: 94 MG/DL (ref 30–149)
VIT D+METAB SERPL-MCNC: 20.7 NG/ML (ref 30–100)
VLDLC SERPL CALC-MCNC: 16 MG/DL (ref 0–30)

## 2022-10-05 PROCEDURE — 36415 COLL VENOUS BLD VENIPUNCTURE: CPT

## 2022-10-05 PROCEDURE — 3044F HG A1C LEVEL LT 7.0%: CPT | Performed by: INTERNAL MEDICINE

## 2022-10-05 PROCEDURE — 80061 LIPID PANEL: CPT

## 2022-10-05 PROCEDURE — 80053 COMPREHEN METABOLIC PANEL: CPT

## 2022-10-05 PROCEDURE — 82306 VITAMIN D 25 HYDROXY: CPT

## 2022-10-05 PROCEDURE — 83036 HEMOGLOBIN GLYCOSYLATED A1C: CPT

## 2022-10-24 ENCOUNTER — OFFICE VISIT (OUTPATIENT)
Dept: SURGERY | Facility: CLINIC | Age: 37
End: 2022-10-24
Payer: COMMERCIAL

## 2022-10-24 VITALS
BODY MASS INDEX: 41.6 KG/M2 | SYSTOLIC BLOOD PRESSURE: 119 MMHG | WEIGHT: 280.88 LBS | OXYGEN SATURATION: 96 % | HEART RATE: 72 BPM | HEIGHT: 69 IN | DIASTOLIC BLOOD PRESSURE: 74 MMHG

## 2022-10-24 DIAGNOSIS — E66.01 MORBID OBESITY WITH BMI OF 40.0-44.9, ADULT (HCC): ICD-10-CM

## 2022-10-24 DIAGNOSIS — R73.03 PRE-DIABETES: Primary | ICD-10-CM

## 2022-10-24 DIAGNOSIS — E78.00 HYPERCHOLESTEROLEMIA WITHOUT HYPERTRIGLYCERIDEMIA: ICD-10-CM

## 2022-10-24 DIAGNOSIS — F43.9 STRESS: ICD-10-CM

## 2022-10-24 DIAGNOSIS — Z51.81 ENCOUNTER FOR THERAPEUTIC DRUG MONITORING: ICD-10-CM

## 2022-10-24 PROCEDURE — 3008F BODY MASS INDEX DOCD: CPT | Performed by: INTERNAL MEDICINE

## 2022-10-24 PROCEDURE — 3074F SYST BP LT 130 MM HG: CPT | Performed by: INTERNAL MEDICINE

## 2022-10-24 PROCEDURE — 3078F DIAST BP <80 MM HG: CPT | Performed by: INTERNAL MEDICINE

## 2022-10-24 PROCEDURE — 99214 OFFICE O/P EST MOD 30 MIN: CPT | Performed by: INTERNAL MEDICINE

## 2022-10-24 RX ORDER — PHENTERMINE HYDROCHLORIDE 15 MG/1
15 CAPSULE ORAL EVERY MORNING
Qty: 30 CAPSULE | Refills: 2 | Status: SHIPPED | OUTPATIENT
Start: 2022-10-24

## 2022-10-24 RX ORDER — ESCITALOPRAM OXALATE 5 MG/1
5 TABLET ORAL DAILY
Qty: 30 TABLET | Refills: 2 | Status: SHIPPED | OUTPATIENT
Start: 2022-10-24

## 2022-10-29 ENCOUNTER — OFFICE VISIT (OUTPATIENT)
Dept: INTERNAL MEDICINE CLINIC | Facility: CLINIC | Age: 37
End: 2022-10-29
Payer: COMMERCIAL

## 2022-10-29 VITALS
RESPIRATION RATE: 14 BRPM | SYSTOLIC BLOOD PRESSURE: 106 MMHG | OXYGEN SATURATION: 97 % | WEIGHT: 275.63 LBS | HEART RATE: 72 BPM | BODY MASS INDEX: 40.82 KG/M2 | DIASTOLIC BLOOD PRESSURE: 68 MMHG | HEIGHT: 69 IN | TEMPERATURE: 98 F

## 2022-10-29 DIAGNOSIS — E55.9 VITAMIN D DEFICIENCY: ICD-10-CM

## 2022-10-29 DIAGNOSIS — I49.3 PVC (PREMATURE VENTRICULAR CONTRACTION): ICD-10-CM

## 2022-10-29 DIAGNOSIS — R31.29 MICROHEMATURIA: ICD-10-CM

## 2022-10-29 DIAGNOSIS — E11.65 UNCONTROLLED TYPE 2 DIABETES MELLITUS WITH HYPERGLYCEMIA (HCC): ICD-10-CM

## 2022-10-29 DIAGNOSIS — K58.9 IRRITABLE BOWEL SYNDROME, UNSPECIFIED TYPE: ICD-10-CM

## 2022-10-29 DIAGNOSIS — F43.9 STRESS: ICD-10-CM

## 2022-10-29 DIAGNOSIS — E66.01 MORBID OBESITY WITH BMI OF 40.0-44.9, ADULT (HCC): ICD-10-CM

## 2022-10-29 DIAGNOSIS — Z00.00 ROUTINE GENERAL MEDICAL EXAMINATION AT A HEALTH CARE FACILITY: Primary | ICD-10-CM

## 2022-10-29 DIAGNOSIS — E78.00 HYPERCHOLESTEROLEMIA WITHOUT HYPERTRIGLYCERIDEMIA: ICD-10-CM

## 2022-10-29 DIAGNOSIS — Z71.85 VACCINE COUNSELING: ICD-10-CM

## 2022-10-29 PROBLEM — R73.03 PRE-DIABETES: Status: RESOLVED | Noted: 2022-01-25 | Resolved: 2022-10-29

## 2022-10-29 LAB
APPEARANCE: CLEAR
BILIRUBIN: NEGATIVE
GLUCOSE (URINE DIPSTICK): NEGATIVE MG/DL
KETONES (URINE DIPSTICK): NEGATIVE MG/DL
LEUKOCYTES: NEGATIVE
MULTISTIX LOT#: NORMAL NUMERIC
NITRITE, URINE: NEGATIVE
OCCULT BLOOD: NEGATIVE
PH, URINE: 7 (ref 4.5–8)
PROTEIN (URINE DIPSTICK): NEGATIVE MG/DL
SPECIFIC GRAVITY: 1.01 (ref 1–1.03)
URINE-COLOR: YELLOW
UROBILINOGEN,SEMI-QN: 0.2 MG/DL (ref 0–1.9)

## 2022-10-29 PROCEDURE — 90677 PCV20 VACCINE IM: CPT | Performed by: INTERNAL MEDICINE

## 2022-10-29 PROCEDURE — 90471 IMMUNIZATION ADMIN: CPT | Performed by: INTERNAL MEDICINE

## 2022-10-29 PROCEDURE — 3078F DIAST BP <80 MM HG: CPT | Performed by: INTERNAL MEDICINE

## 2022-10-29 PROCEDURE — 90472 IMMUNIZATION ADMIN EACH ADD: CPT | Performed by: INTERNAL MEDICINE

## 2022-10-29 PROCEDURE — 3008F BODY MASS INDEX DOCD: CPT | Performed by: INTERNAL MEDICINE

## 2022-10-29 PROCEDURE — 99395 PREV VISIT EST AGE 18-39: CPT | Performed by: INTERNAL MEDICINE

## 2022-10-29 PROCEDURE — 3074F SYST BP LT 130 MM HG: CPT | Performed by: INTERNAL MEDICINE

## 2022-10-29 PROCEDURE — 81003 URINALYSIS AUTO W/O SCOPE: CPT | Performed by: INTERNAL MEDICINE

## 2022-10-29 PROCEDURE — 90686 IIV4 VACC NO PRSV 0.5 ML IM: CPT | Performed by: INTERNAL MEDICINE

## 2022-10-29 RX ORDER — BLOOD SUGAR DIAGNOSTIC
STRIP MISCELLANEOUS
COMMUNITY
Start: 2022-10-26

## 2022-12-08 ENCOUNTER — TELEPHONE (OUTPATIENT)
Dept: INTERNAL MEDICINE CLINIC | Facility: CLINIC | Age: 37
End: 2022-12-08

## 2022-12-08 ENCOUNTER — OFFICE VISIT (OUTPATIENT)
Dept: INTERNAL MEDICINE CLINIC | Facility: CLINIC | Age: 37
End: 2022-12-08
Payer: COMMERCIAL

## 2022-12-08 ENCOUNTER — HOSPITAL ENCOUNTER (OUTPATIENT)
Dept: GENERAL RADIOLOGY | Facility: HOSPITAL | Age: 37
Discharge: HOME | End: 2022-12-08
Attending: NURSE PRACTITIONER
Payer: COMMERCIAL

## 2022-12-08 ENCOUNTER — HOSPITAL ENCOUNTER (OUTPATIENT)
Dept: GENERAL RADIOLOGY | Facility: HOSPITAL | Age: 37
Discharge: HOME | End: 2022-12-08
Attending: INTERNAL MEDICINE
Payer: COMMERCIAL

## 2022-12-08 VITALS
TEMPERATURE: 98 F | SYSTOLIC BLOOD PRESSURE: 108 MMHG | HEIGHT: 69 IN | HEART RATE: 69 BPM | RESPIRATION RATE: 14 BRPM | DIASTOLIC BLOOD PRESSURE: 66 MMHG | BODY MASS INDEX: 41.44 KG/M2 | WEIGHT: 279.81 LBS | OXYGEN SATURATION: 98 %

## 2022-12-08 DIAGNOSIS — M25.552 CHRONIC LEFT HIP PAIN: Primary | ICD-10-CM

## 2022-12-08 DIAGNOSIS — G89.29 CHRONIC LEFT HIP PAIN: ICD-10-CM

## 2022-12-08 DIAGNOSIS — G89.29 CHRONIC LEFT HIP PAIN: Primary | ICD-10-CM

## 2022-12-08 DIAGNOSIS — M25.552 CHRONIC LEFT HIP PAIN: ICD-10-CM

## 2022-12-08 PROCEDURE — 73502 X-RAY EXAM HIP UNI 2-3 VIEWS: CPT | Performed by: INTERNAL MEDICINE

## 2022-12-08 PROCEDURE — 3074F SYST BP LT 130 MM HG: CPT | Performed by: NURSE PRACTITIONER

## 2022-12-08 PROCEDURE — 99214 OFFICE O/P EST MOD 30 MIN: CPT | Performed by: NURSE PRACTITIONER

## 2022-12-08 PROCEDURE — 3078F DIAST BP <80 MM HG: CPT | Performed by: NURSE PRACTITIONER

## 2022-12-08 PROCEDURE — 72110 X-RAY EXAM L-2 SPINE 4/>VWS: CPT | Performed by: NURSE PRACTITIONER

## 2022-12-08 PROCEDURE — 3008F BODY MASS INDEX DOCD: CPT | Performed by: NURSE PRACTITIONER

## 2022-12-08 RX ORDER — METHYLPREDNISOLONE 4 MG/1
TABLET ORAL
Qty: 21 EACH | Refills: 0 | Status: SHIPPED | OUTPATIENT
Start: 2022-12-08

## 2022-12-08 NOTE — TELEPHONE ENCOUNTER
Patient states she just left PCP office, currently at X-ray. States she should have orders for lower back and left hip imaging. However, instead of left hip an order for \"XR RIBS UNILATERAL\" had been ordered      Per OV note 12/8/22 with Arben Ocasio =   The pain is present in the left hip. This is a chronic problem. Chronic left hip pain  (primary encounter diagnosis)  Start Medrol Dosepak take with food. Avoid Advil or any NSAIDs while on this medication.   Order x-ray

## 2022-12-08 NOTE — PATIENT INSTRUCTIONS
ASSESSMENT/PLAN:   Chronic left hip pain  (primary encounter diagnosis)  Start Medrol Dosepak take with food. Avoid Advil or any NSAIDs while on this medication. Order x-ray  Start below exercises daily  Follow-up with physical therapy referral entered. No orders of the defined types were placed in this encounter. Follow-up as scheduled or sooner if needed. Meds This Visit:  Requested Prescriptions     Signed Prescriptions Disp Refills    methylPREDNISolone (MEDROL) 4 MG Oral Tablet Therapy Pack 21 each 0     Sig: As directed.        Imaging & Referrals:  PHYSICAL THERAPY - INTERNAL  XR LUMBAR SPINE (MIN 2 VIEWS) (CPT=72100)  XR RIBS, UNILATERAL (2 VIEWS), LEFT (CPT=71100)

## 2022-12-14 ENCOUNTER — NURSE ONLY (OUTPATIENT)
Dept: OBGYN CLINIC | Facility: CLINIC | Age: 37
End: 2022-12-14
Payer: COMMERCIAL

## 2022-12-14 PROCEDURE — 96372 THER/PROPH/DIAG INJ SC/IM: CPT | Performed by: OBSTETRICS & GYNECOLOGY

## 2022-12-14 RX ADMIN — MEDROXYPROGESTERONE ACETATE 150 MG: 150 INJECTION, SUSPENSION INTRAMUSCULAR at 13:49:00

## 2022-12-14 NOTE — PROGRESS NOTES
Patient name and  verified. Patient presents for Depo 150 mg injection. Injection given in L deltoid and patient tolerated well. No adverse reactions noted. nect depo window given to patient (3/1-3/15/2023). Encouraged to schedule annual around depo window to be completed at same appt. Verbalized understanding.

## 2023-02-06 ENCOUNTER — OFFICE VISIT (OUTPATIENT)
Dept: SURGERY | Facility: CLINIC | Age: 38
End: 2023-02-06
Payer: COMMERCIAL

## 2023-02-06 VITALS
BODY MASS INDEX: 42.36 KG/M2 | DIASTOLIC BLOOD PRESSURE: 70 MMHG | WEIGHT: 286 LBS | HEART RATE: 74 BPM | SYSTOLIC BLOOD PRESSURE: 118 MMHG | HEIGHT: 69 IN | OXYGEN SATURATION: 98 %

## 2023-02-06 DIAGNOSIS — Z51.81 ENCOUNTER FOR THERAPEUTIC DRUG MONITORING: ICD-10-CM

## 2023-02-06 DIAGNOSIS — R73.03 PRE-DIABETES: Primary | ICD-10-CM

## 2023-02-06 DIAGNOSIS — E66.01 MORBID OBESITY WITH BMI OF 40.0-44.9, ADULT (HCC): ICD-10-CM

## 2023-02-06 DIAGNOSIS — E78.00 HYPERCHOLESTEROLEMIA WITHOUT HYPERTRIGLYCERIDEMIA: ICD-10-CM

## 2023-02-06 DIAGNOSIS — F43.9 STRESS: ICD-10-CM

## 2023-02-06 PROCEDURE — 3008F BODY MASS INDEX DOCD: CPT | Performed by: INTERNAL MEDICINE

## 2023-02-06 PROCEDURE — 99214 OFFICE O/P EST MOD 30 MIN: CPT | Performed by: INTERNAL MEDICINE

## 2023-02-06 PROCEDURE — 3074F SYST BP LT 130 MM HG: CPT | Performed by: INTERNAL MEDICINE

## 2023-02-06 PROCEDURE — 3078F DIAST BP <80 MM HG: CPT | Performed by: INTERNAL MEDICINE

## 2023-02-06 RX ORDER — PHENTERMINE HYDROCHLORIDE 15 MG/1
15 CAPSULE ORAL EVERY MORNING
Qty: 30 CAPSULE | Refills: 2 | Status: SHIPPED | OUTPATIENT
Start: 2023-02-06

## 2023-02-06 RX ORDER — ESCITALOPRAM OXALATE 5 MG/1
5 TABLET ORAL EVERY EVENING
Qty: 30 TABLET | Refills: 2 | Status: SHIPPED | OUTPATIENT
Start: 2023-02-06

## 2023-03-02 ENCOUNTER — OFFICE VISIT (OUTPATIENT)
Dept: OBGYN CLINIC | Facility: CLINIC | Age: 38
End: 2023-03-02

## 2023-03-02 VITALS
SYSTOLIC BLOOD PRESSURE: 118 MMHG | WEIGHT: 280 LBS | BODY MASS INDEX: 41.47 KG/M2 | DIASTOLIC BLOOD PRESSURE: 76 MMHG | HEIGHT: 69 IN

## 2023-03-02 DIAGNOSIS — Z01.419 WELL WOMAN EXAM WITH ROUTINE GYNECOLOGICAL EXAM: ICD-10-CM

## 2023-03-02 DIAGNOSIS — Z01.419 ENCOUNTER FOR GYNECOLOGICAL EXAMINATION WITHOUT ABNORMAL FINDING: ICD-10-CM

## 2023-03-02 DIAGNOSIS — Z30.42 DEPO-PROVERA CONTRACEPTIVE STATUS: Primary | ICD-10-CM

## 2023-03-02 PROCEDURE — 3074F SYST BP LT 130 MM HG: CPT | Performed by: OBSTETRICS & GYNECOLOGY

## 2023-03-02 PROCEDURE — 81025 URINE PREGNANCY TEST: CPT | Performed by: OBSTETRICS & GYNECOLOGY

## 2023-03-02 PROCEDURE — 3078F DIAST BP <80 MM HG: CPT | Performed by: OBSTETRICS & GYNECOLOGY

## 2023-03-02 PROCEDURE — 96372 THER/PROPH/DIAG INJ SC/IM: CPT | Performed by: OBSTETRICS & GYNECOLOGY

## 2023-03-02 PROCEDURE — 3008F BODY MASS INDEX DOCD: CPT | Performed by: OBSTETRICS & GYNECOLOGY

## 2023-03-02 PROCEDURE — 99395 PREV VISIT EST AGE 18-39: CPT | Performed by: OBSTETRICS & GYNECOLOGY

## 2023-03-02 RX ORDER — MEDROXYPROGESTERONE ACETATE 150 MG/ML
150 INJECTION, SUSPENSION INTRAMUSCULAR
Status: SHIPPED | OUTPATIENT
Start: 2023-03-02

## 2023-03-02 RX ADMIN — MEDROXYPROGESTERONE ACETATE 150 MG: 150 INJECTION, SUSPENSION INTRAMUSCULAR at 09:42:00

## 2023-03-20 LAB — HPV I/H RISK 1 DNA SPEC QL NAA+PROBE: NEGATIVE

## 2023-04-26 ENCOUNTER — LAB ENCOUNTER (OUTPATIENT)
Dept: LAB | Facility: HOSPITAL | Age: 38
End: 2023-04-26
Attending: INTERNAL MEDICINE
Payer: COMMERCIAL

## 2023-04-26 DIAGNOSIS — E55.9 VITAMIN D DEFICIENCY: ICD-10-CM

## 2023-04-26 DIAGNOSIS — E11.65 UNCONTROLLED TYPE 2 DIABETES MELLITUS WITH HYPERGLYCEMIA (HCC): ICD-10-CM

## 2023-04-26 PROBLEM — R73.03 PRE-DIABETES: Status: RESOLVED | Noted: 2022-01-25 | Resolved: 2023-04-26

## 2023-04-26 LAB
ALBUMIN SERPL-MCNC: 3.8 G/DL (ref 3.4–5)
ALBUMIN/GLOB SERPL: 1 {RATIO} (ref 1–2)
ALP LIVER SERPL-CCNC: 56 U/L
ALT SERPL-CCNC: 26 U/L
ANION GAP SERPL CALC-SCNC: 7 MMOL/L (ref 0–18)
AST SERPL-CCNC: 13 U/L (ref 15–37)
BILIRUB SERPL-MCNC: 0.4 MG/DL (ref 0.1–2)
BUN BLD-MCNC: 9 MG/DL (ref 7–18)
BUN/CREAT SERPL: 13 (ref 10–20)
CALCIUM BLD-MCNC: 8.9 MG/DL (ref 8.5–10.1)
CHLORIDE SERPL-SCNC: 109 MMOL/L (ref 98–112)
CHOLEST SERPL-MCNC: 197 MG/DL (ref ?–200)
CO2 SERPL-SCNC: 25 MMOL/L (ref 21–32)
CREAT BLD-MCNC: 0.69 MG/DL
CREAT UR-SCNC: 111 MG/DL
FASTING PATIENT LIPID ANSWER: YES
FASTING STATUS PATIENT QL REPORTED: YES
GFR SERPLBLD BASED ON 1.73 SQ M-ARVRAT: 114 ML/MIN/1.73M2 (ref 60–?)
GLOBULIN PLAS-MCNC: 4 G/DL (ref 2.8–4.4)
GLUCOSE BLD-MCNC: 98 MG/DL (ref 70–99)
HDLC SERPL-MCNC: 48 MG/DL (ref 40–59)
LDLC SERPL CALC-MCNC: 122 MG/DL (ref ?–100)
MICROALBUMIN UR-MCNC: <0.5 MG/DL
NONHDLC SERPL-MCNC: 149 MG/DL (ref ?–130)
OSMOLALITY SERPL CALC.SUM OF ELEC: 291 MOSM/KG (ref 275–295)
POTASSIUM SERPL-SCNC: 4.1 MMOL/L (ref 3.5–5.1)
PROT SERPL-MCNC: 7.8 G/DL (ref 6.4–8.2)
SODIUM SERPL-SCNC: 141 MMOL/L (ref 136–145)
TRIGL SERPL-MCNC: 155 MG/DL (ref 30–149)
VIT D+METAB SERPL-MCNC: 36.5 NG/ML (ref 30–100)
VLDLC SERPL CALC-MCNC: 27 MG/DL (ref 0–30)

## 2023-04-26 PROCEDURE — 36415 COLL VENOUS BLD VENIPUNCTURE: CPT

## 2023-04-26 PROCEDURE — 82306 VITAMIN D 25 HYDROXY: CPT

## 2023-04-26 PROCEDURE — 80053 COMPREHEN METABOLIC PANEL: CPT

## 2023-04-26 PROCEDURE — 82043 UR ALBUMIN QUANTITATIVE: CPT

## 2023-04-26 PROCEDURE — 3061F NEG MICROALBUMINURIA REV: CPT | Performed by: INTERNAL MEDICINE

## 2023-04-26 PROCEDURE — 82570 ASSAY OF URINE CREATININE: CPT

## 2023-04-26 PROCEDURE — 80061 LIPID PANEL: CPT

## 2023-04-27 ENCOUNTER — OFFICE VISIT (OUTPATIENT)
Dept: INTERNAL MEDICINE CLINIC | Facility: CLINIC | Age: 38
End: 2023-04-27

## 2023-04-27 ENCOUNTER — TELEPHONE (OUTPATIENT)
Dept: INTERNAL MEDICINE CLINIC | Facility: CLINIC | Age: 38
End: 2023-04-27

## 2023-04-27 VITALS
DIASTOLIC BLOOD PRESSURE: 71 MMHG | BODY MASS INDEX: 42.6 KG/M2 | HEART RATE: 64 BPM | RESPIRATION RATE: 16 BRPM | OXYGEN SATURATION: 97 % | TEMPERATURE: 98 F | HEIGHT: 69 IN | WEIGHT: 287.63 LBS | SYSTOLIC BLOOD PRESSURE: 110 MMHG

## 2023-04-27 DIAGNOSIS — E78.00 HYPERCHOLESTEROLEMIA WITHOUT HYPERTRIGLYCERIDEMIA: ICD-10-CM

## 2023-04-27 DIAGNOSIS — E11.65 UNCONTROLLED TYPE 2 DIABETES MELLITUS WITH HYPERGLYCEMIA (HCC): ICD-10-CM

## 2023-04-27 DIAGNOSIS — Z71.85 VACCINE COUNSELING: Primary | ICD-10-CM

## 2023-04-27 DIAGNOSIS — G89.29 CHRONIC BILATERAL LOW BACK PAIN WITH BILATERAL SCIATICA: ICD-10-CM

## 2023-04-27 DIAGNOSIS — E55.9 VITAMIN D DEFICIENCY: ICD-10-CM

## 2023-04-27 DIAGNOSIS — M54.41 CHRONIC BILATERAL LOW BACK PAIN WITH BILATERAL SCIATICA: ICD-10-CM

## 2023-04-27 DIAGNOSIS — M54.42 CHRONIC BILATERAL LOW BACK PAIN WITH BILATERAL SCIATICA: ICD-10-CM

## 2023-04-27 PROCEDURE — 36415 COLL VENOUS BLD VENIPUNCTURE: CPT | Performed by: INTERNAL MEDICINE

## 2023-04-27 PROCEDURE — 3008F BODY MASS INDEX DOCD: CPT | Performed by: INTERNAL MEDICINE

## 2023-04-27 PROCEDURE — 3074F SYST BP LT 130 MM HG: CPT | Performed by: INTERNAL MEDICINE

## 2023-04-27 PROCEDURE — 3044F HG A1C LEVEL LT 7.0%: CPT | Performed by: INTERNAL MEDICINE

## 2023-04-27 PROCEDURE — 99214 OFFICE O/P EST MOD 30 MIN: CPT | Performed by: INTERNAL MEDICINE

## 2023-04-27 PROCEDURE — 90471 IMMUNIZATION ADMIN: CPT | Performed by: INTERNAL MEDICINE

## 2023-04-27 PROCEDURE — 96372 THER/PROPH/DIAG INJ SC/IM: CPT | Performed by: INTERNAL MEDICINE

## 2023-04-27 PROCEDURE — 90715 TDAP VACCINE 7 YRS/> IM: CPT | Performed by: INTERNAL MEDICINE

## 2023-04-27 PROCEDURE — 3078F DIAST BP <80 MM HG: CPT | Performed by: INTERNAL MEDICINE

## 2023-04-27 RX ORDER — TIZANIDINE 4 MG/1
4 TABLET ORAL NIGHTLY
Qty: 5 TABLET | Refills: 0 | Status: SHIPPED | OUTPATIENT
Start: 2023-04-27 | End: 2023-05-02

## 2023-04-27 RX ORDER — KETOROLAC TROMETHAMINE 30 MG/ML
30 INJECTION, SOLUTION INTRAMUSCULAR; INTRAVENOUS ONCE
Status: COMPLETED | OUTPATIENT
Start: 2023-04-27 | End: 2023-04-27

## 2023-04-27 RX ORDER — KETOROLAC TROMETHAMINE 10 MG/1
10 TABLET, FILM COATED ORAL EVERY 8 HOURS
Qty: 15 TABLET | Refills: 0 | Status: SHIPPED | OUTPATIENT
Start: 2023-04-27

## 2023-04-27 RX ADMIN — KETOROLAC TROMETHAMINE 30 MG: 30 INJECTION, SOLUTION INTRAMUSCULAR; INTRAVENOUS at 16:01:00

## 2023-04-27 NOTE — PROGRESS NOTES
CMP Normal (electrolytes, sugar, kidney and liver functions),   Lipid (choilesterol) is worse. Goal LDL should be less than 70. Goal triglycerides less than 150. Triglycerides are elevated when carbs are high. Lower carbs helps lower triglycerides. Also goal HDL above 50. HDL increases with exercise at least 30 minutes 4 times a week. Recheck lipid panel in 3 to 6 months. Orders written for fasting labs in 3 to 6 months. If not taking atorvastatin to begin. If taking atorvastatin would increase to 20 mg.  Vitamin D level looks good. Continue the same. Urine shows no protein spillage from the affected diabetes.

## 2023-04-28 LAB
EST. AVERAGE GLUCOSE BLD GHB EST-MCNC: 134 MG/DL (ref 68–126)
HBA1C MFR BLD: 6.3 % (ref ?–5.7)

## 2023-04-28 NOTE — TELEPHONE ENCOUNTER
Spoke to Derrick Slade from [place]. She was just asking if patient received Toradol injection prior to Toradol tablets being dispensed. Relayed patient did get an injection in the office yesterday. That was all she needed to know.

## 2023-04-28 NOTE — TELEPHONE ENCOUNTER
Name: [facility] DIPTI BASURTO                      2023 8:02:16 AM  ProfileId:                        PagerID       [phone]  [facility]  ======================================================================  Paging    Message # [phone]         2023 04:56p   [NOELLE]  To:  From:  ASHLEY  Primary MD:  Phone#:  ----------------------------------------------------------------------  [place] [phone] PT KORINA CONROY [birth year] Paged at number :  PAGE: [phone] at :  16:56

## 2023-05-19 ENCOUNTER — NURSE ONLY (OUTPATIENT)
Dept: OBGYN CLINIC | Facility: CLINIC | Age: 38
End: 2023-05-19

## 2023-05-19 VITALS — SYSTOLIC BLOOD PRESSURE: 114 MMHG | DIASTOLIC BLOOD PRESSURE: 76 MMHG

## 2023-05-19 DIAGNOSIS — Z30.42 DEPO-PROVERA CONTRACEPTIVE STATUS: Primary | ICD-10-CM

## 2023-05-19 PROCEDURE — 81025 URINE PREGNANCY TEST: CPT | Performed by: OBSTETRICS & GYNECOLOGY

## 2023-05-19 PROCEDURE — 3078F DIAST BP <80 MM HG: CPT | Performed by: OBSTETRICS & GYNECOLOGY

## 2023-05-19 PROCEDURE — 96372 THER/PROPH/DIAG INJ SC/IM: CPT | Performed by: OBSTETRICS & GYNECOLOGY

## 2023-05-19 PROCEDURE — 3074F SYST BP LT 130 MM HG: CPT | Performed by: OBSTETRICS & GYNECOLOGY

## 2023-05-19 RX ADMIN — MEDROXYPROGESTERONE ACETATE 150 MG: 150 INJECTION, SUSPENSION INTRAMUSCULAR at 09:12:00

## 2023-07-17 ENCOUNTER — TELEPHONE (OUTPATIENT)
Dept: OBGYN CLINIC | Facility: CLINIC | Age: 38
End: 2023-07-17

## 2023-08-07 ENCOUNTER — OFFICE VISIT (OUTPATIENT)
Dept: INTERNAL MEDICINE CLINIC | Facility: CLINIC | Age: 38
End: 2023-08-07

## 2023-08-07 VITALS
HEIGHT: 69 IN | TEMPERATURE: 98 F | DIASTOLIC BLOOD PRESSURE: 68 MMHG | WEIGHT: 293 LBS | HEART RATE: 72 BPM | BODY MASS INDEX: 43.4 KG/M2 | SYSTOLIC BLOOD PRESSURE: 103 MMHG | RESPIRATION RATE: 18 BRPM

## 2023-08-07 DIAGNOSIS — M54.41 CHRONIC BILATERAL LOW BACK PAIN WITH BILATERAL SCIATICA: ICD-10-CM

## 2023-08-07 DIAGNOSIS — M54.42 CHRONIC BILATERAL LOW BACK PAIN WITH BILATERAL SCIATICA: ICD-10-CM

## 2023-08-07 DIAGNOSIS — E78.00 HYPERCHOLESTEROLEMIA WITHOUT HYPERTRIGLYCERIDEMIA: Primary | ICD-10-CM

## 2023-08-07 DIAGNOSIS — E11.65 UNCONTROLLED TYPE 2 DIABETES MELLITUS WITH HYPERGLYCEMIA (HCC): ICD-10-CM

## 2023-08-07 DIAGNOSIS — G89.29 CHRONIC BILATERAL LOW BACK PAIN WITH BILATERAL SCIATICA: ICD-10-CM

## 2023-08-07 DIAGNOSIS — Z71.85 VACCINE COUNSELING: ICD-10-CM

## 2023-08-07 DIAGNOSIS — E55.9 VITAMIN D DEFICIENCY: ICD-10-CM

## 2023-08-07 PROCEDURE — 3074F SYST BP LT 130 MM HG: CPT | Performed by: INTERNAL MEDICINE

## 2023-08-07 PROCEDURE — 3078F DIAST BP <80 MM HG: CPT | Performed by: INTERNAL MEDICINE

## 2023-08-07 PROCEDURE — 3008F BODY MASS INDEX DOCD: CPT | Performed by: INTERNAL MEDICINE

## 2023-08-07 PROCEDURE — 99215 OFFICE O/P EST HI 40 MIN: CPT | Performed by: INTERNAL MEDICINE

## 2023-08-07 RX ORDER — NABUMETONE 750 MG/1
750 TABLET, FILM COATED ORAL 2 TIMES DAILY PRN
Qty: 60 TABLET | Refills: 1 | Status: SHIPPED | OUTPATIENT
Start: 2023-08-07

## 2023-08-08 ENCOUNTER — TELEPHONE (OUTPATIENT)
Dept: INTERNAL MEDICINE CLINIC | Facility: CLINIC | Age: 38
End: 2023-08-08

## 2023-08-08 NOTE — PROGRESS NOTES
Patient has been informed of message joshua.  She will make an appointment with her Ophthalmologist.

## 2023-08-08 NOTE — H&P
Note from optometrist.  No evidence of a diabetic eye exam.  Would recommend she follow-up with ophthalmology and get a full eye exam.  She can see Dr. Blanco Roman.   Does not need a referral.

## 2023-08-10 ENCOUNTER — NURSE ONLY (OUTPATIENT)
Dept: OBGYN CLINIC | Facility: CLINIC | Age: 38
End: 2023-08-10

## 2023-08-10 VITALS — DIASTOLIC BLOOD PRESSURE: 83 MMHG | SYSTOLIC BLOOD PRESSURE: 126 MMHG

## 2023-08-10 DIAGNOSIS — Z30.42 DEPO-PROVERA CONTRACEPTIVE STATUS: Primary | ICD-10-CM

## 2023-08-10 LAB
CONTROL LINE PRESENT WITH A CLEAR BACKGROUND (YES/NO): YES YES/NO
KIT LOT #: NORMAL NUMERIC

## 2023-08-10 PROCEDURE — 96372 THER/PROPH/DIAG INJ SC/IM: CPT | Performed by: OBSTETRICS & GYNECOLOGY

## 2023-08-10 PROCEDURE — 81025 URINE PREGNANCY TEST: CPT | Performed by: OBSTETRICS & GYNECOLOGY

## 2023-08-10 RX ADMIN — MEDROXYPROGESTERONE ACETATE 150 MG: 150 INJECTION, SUSPENSION INTRAMUSCULAR at 11:37:00

## 2023-08-10 NOTE — PROGRESS NOTES
Pt Name and  verified. 08/10/23  1137   BP: 126/83     Patient here for Depo 150 mg. Vitals wnl. Injection well tolerated. No adverse reaction noted.     Last depo: 23    Next depo: 10/26 to

## 2023-09-18 ENCOUNTER — TELEPHONE (OUTPATIENT)
Dept: PHYSICAL THERAPY | Facility: HOSPITAL | Age: 38
End: 2023-09-18

## 2023-09-19 ENCOUNTER — OFFICE VISIT (OUTPATIENT)
Dept: PHYSICAL THERAPY | Age: 38
End: 2023-09-19
Attending: INTERNAL MEDICINE
Payer: COMMERCIAL

## 2023-09-19 DIAGNOSIS — M54.41 CHRONIC BILATERAL LOW BACK PAIN WITH BILATERAL SCIATICA: Primary | ICD-10-CM

## 2023-09-19 DIAGNOSIS — G89.29 CHRONIC BILATERAL LOW BACK PAIN WITH BILATERAL SCIATICA: Primary | ICD-10-CM

## 2023-09-19 DIAGNOSIS — M54.42 CHRONIC BILATERAL LOW BACK PAIN WITH BILATERAL SCIATICA: Primary | ICD-10-CM

## 2023-09-19 PROCEDURE — 97110 THERAPEUTIC EXERCISES: CPT

## 2023-09-19 PROCEDURE — 97162 PT EVAL MOD COMPLEX 30 MIN: CPT

## 2023-09-21 ENCOUNTER — OFFICE VISIT (OUTPATIENT)
Dept: PHYSICAL THERAPY | Age: 38
End: 2023-09-21
Attending: INTERNAL MEDICINE
Payer: COMMERCIAL

## 2023-09-21 PROCEDURE — 97140 MANUAL THERAPY 1/> REGIONS: CPT

## 2023-09-21 PROCEDURE — 97110 THERAPEUTIC EXERCISES: CPT

## 2023-09-21 NOTE — PROGRESS NOTES
Dx: Chronic bilateral low back pain with bilateral sciatica (M54.42,M54.41,G89.29)            Insurance (Authorized # of Visits): [employer] FEP PPO   (10 POC)        Authorizing Physician: Dr. Deandra Peck MD visit: post therapy   Fall Risk: standard         Precautions: n/a             Subjective: Pt notes no better or worse. She is compliant with her exercises. To trial a lumbar roll  PAS 4/10  Objective: Moderate to high irritability to graded mobilizations/light MFR  Guarding with tension of low back and gluteal     Assessment: At length discussion on appropriate exercises to do at the gym including walking in the pool and riding a recumbent bike. Reviewed HEP, progression to prone press ups from prone lying. Pt educated modified plank using the wall to work on core stability. Treatment to continue to address hypersensitivity, working on reducing guarding/tension       Goals:  (to be met in 8-10 visits)      Pt will improve transversus abdominis recruitment to perform proper isometric contraction without requiring verbal or tactile cuing to promote advancement of therex   Pt will demonstrate good understanding of proper posture and body mechanics to decrease pain and improve spinal safety   Pt will improve lumbar spine AROM flexion and L SB motion to allow increase ease with LE dressing and lifting    Pt will have decreased paraspinal mm tension to tolerate standing > 45 minutes for work and home activities   Pt will demonstrate improvement in ease of bed mobility to be able to sleep > 4 hours without limitation   Pt will be independent and compliant with comprehensive HEP to maintain progress achieved in PT     Plan: alleviate pain; improve l/s ROM/mobility, flexibility, neural mobility, core/proximal hip stability, LE strength (L5 myotome), balance, body mechanics   Date: 9/21/2023  TX#: 2 Date:                 TX#: 3/ Date:                 TX#: 4/ Date:                 TX#: 5/ Date:    Tx#: 6/   Ther ex:  Prone lying 1 pillow x 5 min (HP)   GIANLUCA>PPU 5x  Wall plank 5x 5 cts  Seated piliates core compression 8x R/L  SciFit distance 10 L0 x 5 min         Manual:  Prone: grade I to II+ PA and L UPA mobilization  Prone: lumbar paraspinal STM/MFR                       HEP: (review); PPU; wall plank     Charges: 2TE (30 min); 1MM (10 min)       Total Timed Treatment: 40 min  Total Treatment Time: 42 min

## 2023-09-26 ENCOUNTER — OFFICE VISIT (OUTPATIENT)
Dept: PHYSICAL THERAPY | Age: 38
End: 2023-09-26
Attending: INTERNAL MEDICINE
Payer: COMMERCIAL

## 2023-09-26 PROCEDURE — 97110 THERAPEUTIC EXERCISES: CPT

## 2023-09-26 PROCEDURE — 97140 MANUAL THERAPY 1/> REGIONS: CPT

## 2023-09-26 NOTE — PROGRESS NOTES
Dx: Chronic bilateral low back pain with bilateral sciatica (M54.42,M54.41,G89.29)            Insurance (Authorized # of Visits): Anayeli Nino FEP PPO   (10 POC)        Authorizing Physician: Dr. Traci Peck MD visit: post therapy   Fall Risk: standard         Precautions: n/a             Subjective: Pt notes more soreness from taking care of her dog. She has been compliant with her exercises. PAS 4/10 soreneas  Objective: Moderate to high irritability to graded mobilizations/light MFR  Guarding with tension of low back and gluteal   L-sided core instability    Assessment:   Treatment addressed signs of core instability with activation of the transversus abdominus. Patient education on proper diaphragmatic breathing and activation of the TrA. Cueing to reduce stress as well as compensatory firing. Patient to perform for HEP, no resistance. Goals:  (to be met in 8-10 visits)      Pt will improve transversus abdominis recruitment to perform proper isometric contraction without requiring verbal or tactile cuing to promote advancement of therex   Pt will demonstrate good understanding of proper posture and body mechanics to decrease pain and improve spinal safety   Pt will improve lumbar spine AROM flexion and L SB motion to allow increase ease with LE dressing and lifting    Pt will have decreased paraspinal mm tension to tolerate standing > 45 minutes for work and home activities   Pt will demonstrate improvement in ease of bed mobility to be able to sleep > 4 hours without limitation   Pt will be independent and compliant with comprehensive HEP to maintain progress achieved in PT     Plan: alleviate pain; improve l/s ROM/mobility, flexibility, neural mobility, core/proximal hip stability, LE strength (L5 myotome), balance, body mechanics   Date: 9/21/2023  TX#: 2 Date: 9/26/23                 TX#: 3 Date:                 TX#: 4/ Date:                 TX#: 5/ Date:    Tx#: 6/   Ther ex:  Prone lying 1 pillow x 5 min (HP)   GIANLUCA>PPU 5x  Wall plank 5x 5 cts  Seated piliates core compression 8x R/L  SciFit distance 10 L0 x 5 min   Ther ex:  Prone lying 1 pillow x 5 min (HP)   PPU 3x (I,W)  Hook lying TrA activation with diaphragmatic breathing 10x  Hooklying hip abd iso with [place] 10 x 5 cts  Hooklying core iso with [place] R/L 6x ea             Manual:  Prone: grade I to II+ PA and L UPA mobilization  Prone: lumbar paraspinal STM/MFR   Manual:  Prone: grade I to II+ PA and L UPA mobilization  Prone: lumbar paraspinal STM/MFR                    HEP: (review); PPU; wall plank; diaphragmatic breathing with TrA      Charges: 2TE (30 min); 1MM (10 min)       Total Timed Treatment: 40 min  Total Treatment Time: 42 min

## 2023-09-28 ENCOUNTER — OFFICE VISIT (OUTPATIENT)
Dept: PHYSICAL THERAPY | Age: 38
End: 2023-09-28
Attending: INTERNAL MEDICINE
Payer: COMMERCIAL

## 2023-09-28 PROCEDURE — 97110 THERAPEUTIC EXERCISES: CPT

## 2023-10-03 ENCOUNTER — OFFICE VISIT (OUTPATIENT)
Dept: PHYSICAL THERAPY | Age: 38
End: 2023-10-03
Attending: INTERNAL MEDICINE
Payer: COMMERCIAL

## 2023-10-03 PROCEDURE — 97110 THERAPEUTIC EXERCISES: CPT

## 2023-10-03 PROCEDURE — 97014 ELECTRIC STIMULATION THERAPY: CPT

## 2023-10-03 NOTE — PROGRESS NOTES
Dx: Chronic bilateral low back pain with bilateral sciatica (M54.42,M54.41,G89.29)            Insurance (Authorized # of Visits): [address] FEP PPO   (10 POC)        Authorizing Physician: Dr. Toño Peck MD visit: post therapy   Fall Risk: standard         Precautions: n/a             Subjective: Pt notes more pain over the weekend secondary to being on her feet and cleaning. After she sat down, her pain worsened. She was unable to move on Sunday. Pain began to calm down Monday night, with pain medication. Today she feels stiffness      PAS 4/10 soreneas  Objective: Moderate to high irritability to graded mobilizations/light MFR  Guarding with tension of low back and gluteal   L-sided core instability    Assessment:   No significant change in pain with end range extension motion. Return to flexion-biased activity in a gravity eliminated position. Patient notes self manipulation with posterior pelvic tilting. Addition for HEP. Patient to trial flexion-based exercises and reassess symptoms. Trial IFC with HP for pain attenuation.        Goals:  (to be met in 8-10 visits)      Pt will improve transversus abdominis recruitment to perform proper isometric contraction without requiring verbal or tactile cuing to promote advancement of therex   Pt will demonstrate good understanding of proper posture and body mechanics to decrease pain and improve spinal safety   Pt will improve lumbar spine AROM flexion and L SB motion to allow increase ease with LE dressing and lifting    Pt will have decreased paraspinal mm tension to tolerate standing > 45 minutes for work and home activities   Pt will demonstrate improvement in ease of bed mobility to be able to sleep > 4 hours without limitation   Pt will be independent and compliant with comprehensive HEP to maintain progress achieved in PT     Plan: alleviate pain; improve l/s ROM/mobility, flexibility, neural mobility, core/proximal hip stability, LE strength (L5 myotome), balance, body mechanics   Date: 9/21/2023  TX#: 2 Date: 9/26/23                 TX#: 3 Date: 9/28/23                TX#: 4 Date: 10/3/23                 TX#: 5 Date:    Tx#: 6   Ther ex:  Prone lying 1 pillow x 5 min (HP)   GIANLUCA>PPU 5x  Wall plank 5x 5 cts  Seated piliates core compression 8x R/L  SciFit distance 10 L0 x 5 min   Ther ex:  Prone lying 1 pillow x 5 min (HP)   PPU 3x (I,W)  Hook lying TrA activation with diaphragmatic breathing 10x  Hooklying hip abd iso with [place] 10 x 5 cts  Hooklying core iso with [place] R/L 6x ea        Ther ex:  SciFIt distance 10 L0 x 6 min  Lunges on step R/L 10x 5 cts  SB roll up wall 10 x 5 cts  Standing core punch outs YTB 10x  Narrow row YTB (split stance) 2 x 10  Wall angels 10x  Ther ex:  SciFIt distance 10 L0 x 6 min  Supine DKTC on SB 10x  Supine LTR on SB 10x  Hamstring stretch with belt R/L 3 x 20 cts  Hooklying hip add SB with posterior tilt 2 x 10      Manual:  Prone: grade I to II+ PA and L UPA mobilization  Prone: lumbar paraspinal STM/MFR   Manual:  Prone: grade I to II+ PA and L UPA mobilization  Prone: lumbar paraspinal STM/MFR Manual:  NA Manual:  NA  IFC: Seated 4 pad to central lower L/s < 20 hz x 15 min with HP                  HEP: (review); PPU; wall plank; diaphragmatic breathing with TrA      Charges: 2TE (25 min); 1TFC (15 min)  Total Timed Treatment: 40 min  Total Treatment Time: 42 min

## 2023-10-05 ENCOUNTER — OFFICE VISIT (OUTPATIENT)
Dept: PHYSICAL THERAPY | Age: 38
End: 2023-10-05
Attending: INTERNAL MEDICINE
Payer: COMMERCIAL

## 2023-10-05 PROCEDURE — 97014 ELECTRIC STIMULATION THERAPY: CPT

## 2023-10-05 PROCEDURE — 97110 THERAPEUTIC EXERCISES: CPT

## 2023-10-05 NOTE — PROGRESS NOTES
Dx: Chronic bilateral low back pain with bilateral sciatica (M54.42,M54.41,G89.29)            Insurance (Authorized # of Visits): [employer]y FEP PPO   (10 POC)        Authorizing Physician: Dr. Toño Peck MD visit: post therapy   Fall Risk: standard         Precautions: n/a             Subjective: Patient notes her symptoms have calmed down. Symptoms are not as 'sore or stiff.' Patient felt IFC was helpful. PAS 4/10 soreness  Objective:   Less guarding with tension of low back and gluteal   L-sided core instability    Assessment:   Patient responding to flexion-based exercises, stretching, and core strengthening program. Reviewed proper transversus abdominus activation with motion. Progression of HEP to include core strength/stabilization.  Progress to standing core and functional hip strengthening      Goals:  (to be met in 8-10 visits)      Pt will improve transversus abdominis recruitment to perform proper isometric contraction without requiring verbal or tactile cuing to promote advancement of therex   Pt will demonstrate good understanding of proper posture and body mechanics to decrease pain and improve spinal safety   Pt will improve lumbar spine AROM flexion and L SB motion to allow increase ease with LE dressing and lifting    Pt will have decreased paraspinal mm tension to tolerate standing > 45 minutes for work and home activities   Pt will demonstrate improvement in ease of bed mobility to be able to sleep > 4 hours without limitation   Pt will be independent and compliant with comprehensive HEP to maintain progress achieved in PT     Plan: alleviate pain; improve l/s ROM/mobility, flexibility, neural mobility, core/proximal hip stability, LE strength (L5 myotome), balance, body mechanics   Date: 9/21/2023  TX#: 2 Date: 9/26/23                 TX#: 3 Date: 9/28/23                TX#: 4 Date: 10/3/23                 TX#: 5 Date: 10/5/23  Tx#: 6    Ther ex:  Prone lying 1 pillow x 5 min (HP)   GIANLUCA>PPU 5x  Wall plank 5x 5 cts  Seated piliates core compression 8x R/L  SciFit distance 10 L0 x 5 min   Ther ex:  Prone lying 1 pillow x 5 min (HP)   PPU 3x (I,W)  Hook lying TrA activation with diaphragmatic breathing 10x  Hooklying hip abd iso with [place] 10 x 5 cts  Hooklying core iso with [place] R/L 6x ea        Ther ex:  SciFIt distance 10 L0 x 6 min  Lunges on step R/L 10x 5 cts  SB roll up wall 10 x 5 cts  Standing core punch outs YTB 10x  Narrow row YTB (split stance) 2 x 10  Wall angels 10x  Ther ex:  SciFIt distance 10 L0 x 6 min  Supine DKTC on SB 10x  Supine LTR on SB 10x  Hamstring stretch with belt R/L 3 x 20 cts  Hooklying hip add SB with posterior tilt 2 x 10   Ther ex:  SciFIt distance 10 L0 x 6 min  Supine DKTC on SB 10x  Supine LTR on SB 10x  Hamstring stretch with belt R/L 3 x 20 cts  Walk outs 10x  [place] (toe tap) R/L 10x  Hooklying hip add SB with posterior tilt 2 x 10 Ther ex:  Standing  neutral oblique punch   Resisted side stepping   Manual:  Prone: grade I to II+ PA and L UPA mobilization  Prone: lumbar paraspinal STM/MFR   Manual:  Prone: grade I to II+ PA and L UPA mobilization  Prone: lumbar paraspinal STM/MFR Manual:  NA Manual:  NA  IFC: Seated 4 pad to central lower L/s < 20 hz x 15 min with HP Manual:  NA  IFC: Seated 4 pad to central lower L/s < 20 hz x 15 min with HP                    HEP: (review); PPU; wall plank; diaphragmatic breathing with TrA; supine walkouts; supine toe taps      Charges: 2TE (25 min); 1 ES (15 min)  Total Timed Treatment: 40 min  Total Treatment Time: 42 min

## 2023-10-10 ENCOUNTER — OFFICE VISIT (OUTPATIENT)
Dept: PHYSICAL THERAPY | Age: 38
End: 2023-10-10
Attending: INTERNAL MEDICINE
Payer: COMMERCIAL

## 2023-10-10 PROCEDURE — 97110 THERAPEUTIC EXERCISES: CPT

## 2023-10-10 PROCEDURE — 97014 ELECTRIC STIMULATION THERAPY: CPT

## 2023-10-10 NOTE — PROGRESS NOTES
Dx: Chronic bilateral low back pain with bilateral sciatica (M54.42,M54.41,G89.29)            Insurance (Authorized # of Visits): [employer] FEP PPO   (10 POC)        Authorizing Physician: Dr. Enedelia Peck MD visit: post therapy   Fall Risk: standard         Precautions: n/a             Subjective: Pt notes being ok over the weekend. However, she had an increase in pain yesterday, no attributing factor. She took her medication, starting to calm down. She notes with stepping will feel pain L PSIS. PAS 4/10 soreness  Objective:   Less guarding with tension of low back and gluteal   L-sided core instability    Assessment:   Overall, patient notes less frequent and intense pain. However, will still have instances of intense pain for no apparent reason, requiring medication and rest. Recommending patient continue with stretching and mobility exercises. Progression of core and hip strength/stability. Patient notes fatigue, no significant pain. Addition of resisted side stepping to HEP.          Goals:  (to be met in 8-10 visits)      Pt will improve transversus abdominis recruitment to perform proper isometric contraction without requiring verbal or tactile cuing to promote advancement of therex   Pt will demonstrate good understanding of proper posture and body mechanics to decrease pain and improve spinal safety   Pt will improve lumbar spine AROM flexion and L SB motion to allow increase ease with LE dressing and lifting    Pt will have decreased paraspinal mm tension to tolerate standing > 45 minutes for work and home activities   Pt will demonstrate improvement in ease of bed mobility to be able to sleep > 4 hours without limitation   Pt will be independent and compliant with comprehensive HEP to maintain progress achieved in PT     Plan: alleviate pain; improve l/s ROM/mobility, flexibility, neural mobility, core/proximal hip stability, LE strength (L5 myotome), balance, body mechanics   Date: 10/3/23 TX#: 5 Date: 10/5/23  Tx#: 6 Date: 10/10/23  Tx#: 7    Ther ex:  SciFIt distance 10 L0 x 6 min  Supine DKTC on SB 10x  Supine LTR on SB 10x  Hamstring stretch with belt R/L 3 x 20 cts  Hooklying hip add SB with posterior tilt 2 x 10   Ther ex:  SciFIt distance 10 L0 x 6 min  Supine DKTC on SB 10x  Supine LTR on SB 10x  Hamstring stretch with belt R/L 3 x 20 cts  Walk outs 10x  [place] (toe tap) R/L 10x  Hooklying hip add SB with posterior tilt 2 x 10 Ther ex:  SciFit distance 10 L0 x 6 min  Standing split stance:  BUE ext YTB 2 x 12  Narrow row YTB 2 x  12  High row YTB 2 x 12  Standing neutral oblique step out and push 2 x 12  Resisted side stepping GTB (forward/lateral) 2x 25 ft    Manual:  NA  IFC: Seated 4 pad to central lower L/s < 20 hz x 15 min with HP Manual:  NA  IFC: Seated 4 pad to central lower L/s < 20 hz x 15 min with HP Manual:  NA  IFC: Seated 4 pad to central lower L/s < 20 hz x 15 min with HP                HEP: (review); PPU; wall plank; diaphragmatic breathing with TrA; supine walkouts; supine toe taps; standing side steps GTB; oblique punch out       Charges: 2TE (25 min); 1 ES (15 min)  Total Timed Treatment: 40 min  Total Treatment Time: 42 min

## 2023-10-12 ENCOUNTER — OFFICE VISIT (OUTPATIENT)
Dept: PHYSICAL THERAPY | Age: 38
End: 2023-10-12
Attending: INTERNAL MEDICINE
Payer: COMMERCIAL

## 2023-10-12 PROCEDURE — 97140 MANUAL THERAPY 1/> REGIONS: CPT

## 2023-10-12 PROCEDURE — 97014 ELECTRIC STIMULATION THERAPY: CPT

## 2023-10-12 PROCEDURE — 97110 THERAPEUTIC EXERCISES: CPT

## 2023-10-12 NOTE — PROGRESS NOTES
Dx: Chronic bilateral low back pain with bilateral sciatica (M54.42,M54.41,G89.29)            Insurance (Authorized # of Visits): [facility] [place] JEFF PPO   (10 POC)        Authorizing Physician: Dr. Zoe Peck MD visit: post therapy   Fall Risk: standard         Precautions: n/a             Subjective: Pt notes pain traveling to her L hip, deep in the joint. These symptoms happen infrequently (less than 1x/month), but when they do, they can last for a week. PAS 6/10  L hip  Objective:   Less guarding with tension of low back and gluteal   L-sided core instability    Assessment:   Patient presented with L hip pain that may be radicular from the lumbar spine. Treatment concentrated on pain management as patient guarded and unable to properly weight bear though her LLE. Pain management included heat, interferential, soft tissue release, stretching, and mobility exercises. Pt to continue with l/s mobility exercises.         Goals:  (to be met in 8-10 visits)      Pt will improve transversus abdominis recruitment to perform proper isometric contraction without requiring verbal or tactile cuing to promote advancement of therex   Pt will demonstrate good understanding of proper posture and body mechanics to decrease pain and improve spinal safety   Pt will improve lumbar spine AROM flexion and L SB motion to allow increase ease with LE dressing and lifting    Pt will have decreased paraspinal mm tension to tolerate standing > 45 minutes for work and home activities   Pt will demonstrate improvement in ease of bed mobility to be able to sleep > 4 hours without limitation   Pt will be independent and compliant with comprehensive HEP to maintain progress achieved in PT     Plan: alleviate pain; improve l/s ROM/mobility, flexibility, neural mobility, core/proximal hip stability, LE strength (L5 myotome), balance, body mechanics   Date: 10/3/23                 TX#: 5 Date: 10/5/23  Tx#: 6 Date: 10/10/23  Tx#: 7 Date: 10/12/23  Tx#: 8   Ther ex:  SciFIt distance 10 L0 x 6 min  Supine DKTC on SB 10x  Supine LTR on SB 10x  Hamstring stretch with belt R/L 3 x 20 cts  Hooklying hip add SB with posterior tilt 2 x 10   Ther ex:  SciFIt distance 10 L0 x 6 min  Supine DKTC on SB 10x  Supine LTR on SB 10x  Hamstring stretch with belt R/L 3 x 20 cts  Walk outs 10x  March (toe tap) R/L 10x  Hooklying hip add SB with posterior tilt 2 x 10 Ther ex:  SciFit distance 10 L0 x 6 min  Standing split stance:  BUE ext YTB 2 x 12  Narrow row YTB 2 x  12  High row YTB 2 x 12  Standing neutral oblique step out and push 2 x 12  Resisted side stepping GTB (forward/lateral) 2x 25 ft Ther ex:  SciFit distance 10 L0 x 6 min  Supine L SKTC 10x  Standing L hip flexor stretch 6 x 10 cts           Manual:  NA  IFC: Seated 4 pad to central lower L/s < 20 hz x 15 min with HP Manual:  NA  IFC: Seated 4 pad to central lower L/s < 20 hz x 15 min with HP Manual:  NA  IFC: Seated 4 pad to central lower L/s < 20 hz x 15 min with HP Manual:  Sdly L hip STM/MFR (ITB, hip flexor, QL)   IFC: Sdly 4 pad to L hip < 20 hz x 15 min with HP               HEP: (review); PPU; wall plank; diaphragmatic breathing with TrA; supine walkouts; supine toe taps; standing side steps GTB; oblique punch out       Charges: 1TE (15 min); 1 ES (15 min); 1MM (10 min)  Total Timed Treatment: 40 min  Total Treatment Time: 42 min

## 2023-10-17 ENCOUNTER — OFFICE VISIT (OUTPATIENT)
Dept: PHYSICAL THERAPY | Age: 38
End: 2023-10-17
Attending: INTERNAL MEDICINE
Payer: COMMERCIAL

## 2023-10-17 PROCEDURE — 97110 THERAPEUTIC EXERCISES: CPT

## 2023-10-17 PROCEDURE — 97014 ELECTRIC STIMULATION THERAPY: CPT

## 2023-10-17 NOTE — PROGRESS NOTES
Dx: Chronic bilateral low back pain with bilateral sciatica (M54.42,M54.41,G89.29)            Insurance (Authorized # of Visits): [address] FEP PPO   (10 POC)        Authorizing Physician: Dr. Jeffrey Peck MD visit: post therapy   Fall Risk: standard         Precautions: n/a             Subjective: Pt notes hip pain has 'let up' but her low back pain is more intense. Overall, she notes temporary relief with stretching and core stabilization. However, recent flare-up has 'set her back'   PAS 6/10 low back   Objective:   Less guarding with tension of low back and gluteal   L-sided core instability    Assessment:   Overall, patient noted improvement in her symptoms with a decrease in pain intensity/frequency and ability to tolerate motion. However, recent flare-up of hip pain has limited progress. Patient requiring pain medication for relief. Treatment addressed pain with light stretching, l/s mobility as well as interferential current for pain attenuation. Education on HEP progression. Reassess next session. Patient plans on f/u with physician in 2 weeks.          Goals:  (to be met in 8-10 visits)      Pt will improve transversus abdominis recruitment to perform proper isometric contraction without requiring verbal or tactile cuing to promote advancement of therex   Pt will demonstrate good understanding of proper posture and body mechanics to decrease pain and improve spinal safety   Pt will improve lumbar spine AROM flexion and L SB motion to allow increase ease with LE dressing and lifting    Pt will have decreased paraspinal mm tension to tolerate standing > 45 minutes for work and home activities   Pt will demonstrate improvement in ease of bed mobility to be able to sleep > 4 hours without limitation   Pt will be independent and compliant with comprehensive HEP to maintain progress achieved in PT     Plan: alleviate pain; improve l/s ROM/mobility, flexibility, neural mobility, core/proximal hip stability, LE strength (L5 myotome), balance, body mechanics   Date: 10/3/23                 TX#: 5 Date: 10/5/23  Tx#: 6 Date: 10/10/23  Tx#: 7 Date: 10/12/23  Tx#: 8 Date: 10/17/23  Tx#: 9   Ther ex:  SciFIt distance 10 L0 x 6 min  Supine DKTC on SB 10x  Supine LTR on SB 10x  Hamstring stretch with belt R/L 3 x 20 cts  Hooklying hip add SB with posterior tilt 2 x 10   Ther ex:  SciFIt distance 10 L0 x 6 min  Supine DKTC on SB 10x  Supine LTR on SB 10x  Hamstring stretch with belt R/L 3 x 20 cts  Walk outs 10x  March (toe tap) R/L 10x  Hooklying hip add SB with posterior tilt 2 x 10 Ther ex:  SciFit distance 10 L0 x 6 min  Standing split stance:  BUE ext YTB 2 x 12  Narrow row YTB 2 x  12  High row YTB 2 x 12  Standing neutral oblique step out and push 2 x 12  Resisted side stepping GTB (forward/lateral) 2x 25 ft Ther ex:  SciFit distance 10 L0 x 6 min  Supine L SKTC 10x  Standing L hip flexor stretch 6 x 10 cts         Ther ex:  SciFit distance 10 L0 x 6 min  Supine hamstring stretch with DF/PF (belt)(cramping) 20x  Seated hip add SB with pelvic tilting 20x  Seated large SB l/s flexion 10x 5 cts  Standing L hip flexor stretch 6 x 10 cts       Manual:  NA  IFC: Seated 4 pad to central lower L/s < 20 hz x 15 min with HP Manual:  NA  IFC: Seated 4 pad to central lower L/s < 20 hz x 15 min with HP Manual:  NA  IFC: Seated 4 pad to central lower L/s < 20 hz x 15 min with HP Manual:  Sdly L hip STM/MFR (ITB, hip flexor, QL)   IFC: Sdly 4 pad to L hip < 20 hz x 15 min with HP Manual:  NA  IFC: Sdly 4 pad to L hip < 20 hz x 15 min with HP                 HEP: (review); PPU; wall plank; diaphragmatic breathing with TrA; supine walkouts; supine toe taps; standing side steps GTB; oblique punch out       Charges: 2TE (15 min); 1 ES (15 min)  Total Timed Treatment: 40 min  Total Treatment Time: 42 min

## 2023-10-19 ENCOUNTER — OFFICE VISIT (OUTPATIENT)
Dept: PHYSICAL THERAPY | Age: 38
End: 2023-10-19
Attending: INTERNAL MEDICINE
Payer: COMMERCIAL

## 2023-10-19 PROCEDURE — 97110 THERAPEUTIC EXERCISES: CPT

## 2023-10-19 PROCEDURE — 97014 ELECTRIC STIMULATION THERAPY: CPT

## 2023-10-19 NOTE — PROGRESS NOTES
PROGRESS NOTE / DC  Dx: Chronic bilateral low back pain with bilateral sciatica (M54.42,M54.41,G89.29)            Insurance (Authorized # of Visits): Cindy Going FEP PPO   (10 POC)        Authorizing Physician: Dr. Lenny Rucker  Next MD visit: post therapy   Fall Risk: standard         Precautions: n/a             Subjective: Pt notes her hip has improved. She feels soreness after her previously noted low back spasm. She notes that previously relieving stretches can aggravate these symptoms    PAS 6/10 low back   Objective:   Less guarding with tension of low back and gluteal   L-sided core instability  L/s flexion: finger tips to toes (central pain, no shooting)   TrA activation: good    Assessment:   Iris has attended 10 sessions for chronic low back pain. Overall, patient notes less intense and frequent pain since starting her exercises. Treatment began to progress to core/hip strength and stability. However, recent flare-up of hip pain has limited progress. Patient requiring pain medication for relief. Patient notes hip pain occurs every so often and may last 1-2 weeks. Pt is comfortable and compliant with her HEP. SHe is progressing towards consistency with her LTGs, to have 'more good days then bad'. At this time, she has a scheduled f/u with her primary to discuss her progress. Recommending patient continue with her HEP until her reevaluation. Patient may benefit from further testing/treatment. She may be a candidate for future physical therapy depending on POC.         Goals:  (to be met in 8-10 visits)      Pt will improve transversus abdominis recruitment to perform proper isometric contraction without requiring verbal or tactile cuing to promote advancement of therex-MET   Pt will demonstrate good understanding of proper posture and body mechanics to decrease pain and improve spinal safety-MET   Pt will improve lumbar spine AROM flexion and L SB motion to allow increase ease with LE dressing and lifting-1/2 MET (depends on day    Pt will have decreased paraspinal mm tension to tolerate standing > 45 minutes for work and home activities 1/2 MET depend on day   Pt will demonstrate improvement in ease of bed mobility to be able to sleep > 4 hours without limitation 1/2 Met depends on day   Pt will be independent and compliant with comprehensive HEP to maintain progress achieved in PT-MET    Plan: to continue with HEP and f/u with her primary to discuss POC    Date: 10/10/23  Tx#: 7 Date: 10/12/23  Tx#: 8 Date: 10/17/23  Tx#: 9 Date: 10/19/23  Tx#: 10   Ther ex:  SciFit distance 10 L0 x 6 min  Standing split stance:  BUE ext YTB 2 x 12  Narrow row YTB 2 x  12  High row YTB 2 x 12  Standing neutral oblique step out and push 2 x 12  Resisted side stepping GTB (forward/lateral) 2x 25 ft Ther ex:  SciFit distance 10 L0 x 6 min  Supine L SKTC 10x  Standing L hip flexor stretch 6 x 10 cts         Ther ex:  SciFit distance 10 L0 x 6 min  Supine hamstring stretch with DF/PF (belt)(cramping) 20x  Seated hip add SB with pelvic tilting 20x  Seated large SB l/s flexion 10x 5 cts  Standing L hip flexor stretch 6 x 10 cts     Ther ex:  REASSESSMENT  SciFit distance 10 L0 x 6 min  BUE ext YTB 2 x 10  Narrow row YTB 2 x 10  Step out with forward push put YTB 2 x 10  Standing hamstring stretch on step 3 x 20 cts  Lunge on bosu R/L 15x   Manual:  NA  IFC: Seated 4 pad to central lower L/s < 20 hz x 15 min with HP Manual:  Sdly L hip STM/MFR (ITB, hip flexor, QL)   IFC: Sdly 4 pad to L hip < 20 hz x 15 min with HP Manual:  NA  IFC: Sdly 4 pad to L hip < 20 hz x 15 min with HP Manual:  NA  IFC: Sdly 4 pad to L hip < 20 hz x 15 min with HP               HEP: (review); PPU; wall plank; diaphragmatic breathing with TrA; supine walkouts; supine toe taps; standing side steps GTB; oblique punch out       Charges: 2TE (15 min); 1 ES (15 min)  Total Timed Treatment: 40 min  Total Treatment Time: 42 min  Send SACH[facility] MyChart      The patient was advised of these findings, precautions, and treatment options and has agreed to actively participate in planning and for this course of care. Thank you for your referral. If you have any questions, please contact me at Dept: [phone]. Sincerely,  Electronically signed by therapist: Clementina Osler, PT,DPT,OCS       Please co-sign or sign and return this letter via fax as soon as possible to [phone]. I certify the need for these services furnished under this plan of treatment and while under my care.     X___________________________________________________ Date____________________    Certification From: 64/62/6153  To:1/18/2024

## 2023-10-27 ENCOUNTER — NURSE ONLY (OUTPATIENT)
Dept: OBGYN CLINIC | Facility: CLINIC | Age: 38
End: 2023-10-27

## 2023-10-27 ENCOUNTER — LAB ENCOUNTER (OUTPATIENT)
Dept: LAB | Facility: HOSPITAL | Age: 38
End: 2023-10-27
Attending: INTERNAL MEDICINE

## 2023-10-27 VITALS — SYSTOLIC BLOOD PRESSURE: 122 MMHG | DIASTOLIC BLOOD PRESSURE: 76 MMHG

## 2023-10-27 DIAGNOSIS — Z30.42 DEPO-PROVERA CONTRACEPTIVE STATUS: Primary | ICD-10-CM

## 2023-10-27 DIAGNOSIS — E11.65 UNCONTROLLED TYPE 2 DIABETES MELLITUS WITH HYPERGLYCEMIA (HCC): ICD-10-CM

## 2023-10-27 LAB
ALBUMIN SERPL-MCNC: 3.7 G/DL (ref 3.4–5)
ALBUMIN/GLOB SERPL: 0.9 {RATIO} (ref 1–2)
ALP LIVER SERPL-CCNC: 61 U/L
ALT SERPL-CCNC: 34 U/L
ANION GAP SERPL CALC-SCNC: 7 MMOL/L (ref 0–18)
AST SERPL-CCNC: 21 U/L (ref 15–37)
BILIRUB SERPL-MCNC: 0.4 MG/DL (ref 0.1–2)
BUN BLD-MCNC: 7 MG/DL (ref 7–18)
BUN/CREAT SERPL: 9.9 (ref 10–20)
CALCIUM BLD-MCNC: 8.7 MG/DL (ref 8.5–10.1)
CHLORIDE SERPL-SCNC: 109 MMOL/L (ref 98–112)
CHOLEST SERPL-MCNC: 152 MG/DL (ref ?–200)
CO2 SERPL-SCNC: 25 MMOL/L (ref 21–32)
CONTROL LINE PRESENT WITH A CLEAR BACKGROUND (YES/NO): YES YES/NO
CREAT BLD-MCNC: 0.71 MG/DL
EGFRCR SERPLBLD CKD-EPI 2021: 112 ML/MIN/1.73M2 (ref 60–?)
FASTING PATIENT LIPID ANSWER: YES
FASTING STATUS PATIENT QL REPORTED: YES
GLOBULIN PLAS-MCNC: 4 G/DL (ref 2.8–4.4)
GLUCOSE BLD-MCNC: 122 MG/DL (ref 70–99)
HDLC SERPL-MCNC: 50 MG/DL (ref 40–59)
KIT LOT #: NORMAL NUMERIC
LDLC SERPL CALC-MCNC: 82 MG/DL (ref ?–100)
NONHDLC SERPL-MCNC: 102 MG/DL (ref ?–130)
OSMOLALITY SERPL CALC.SUM OF ELEC: 291 MOSM/KG (ref 275–295)
POTASSIUM SERPL-SCNC: 3.9 MMOL/L (ref 3.5–5.1)
PREGNANCY TEST, URINE: NEGATIVE
PROT SERPL-MCNC: 7.7 G/DL (ref 6.4–8.2)
SODIUM SERPL-SCNC: 141 MMOL/L (ref 136–145)
TRIGL SERPL-MCNC: 112 MG/DL (ref 30–149)
VLDLC SERPL CALC-MCNC: 18 MG/DL (ref 0–30)

## 2023-10-27 PROCEDURE — 80061 LIPID PANEL: CPT

## 2023-10-27 PROCEDURE — 3078F DIAST BP <80 MM HG: CPT | Performed by: OBSTETRICS & GYNECOLOGY

## 2023-10-27 PROCEDURE — 80053 COMPREHEN METABOLIC PANEL: CPT

## 2023-10-27 PROCEDURE — 3074F SYST BP LT 130 MM HG: CPT | Performed by: OBSTETRICS & GYNECOLOGY

## 2023-10-27 PROCEDURE — 96372 THER/PROPH/DIAG INJ SC/IM: CPT | Performed by: OBSTETRICS & GYNECOLOGY

## 2023-10-27 PROCEDURE — 81025 URINE PREGNANCY TEST: CPT | Performed by: STUDENT IN AN ORGANIZED HEALTH CARE EDUCATION/TRAINING PROGRAM

## 2023-10-27 PROCEDURE — 36415 COLL VENOUS BLD VENIPUNCTURE: CPT

## 2023-10-27 RX ADMIN — MEDROXYPROGESTERONE ACETATE 150 MG: 150 INJECTION, SUSPENSION INTRAMUSCULAR at 08:47:00

## 2023-10-31 ENCOUNTER — OFFICE VISIT (OUTPATIENT)
Dept: INTERNAL MEDICINE CLINIC | Facility: CLINIC | Age: 38
End: 2023-10-31

## 2023-10-31 VITALS
SYSTOLIC BLOOD PRESSURE: 123 MMHG | DIASTOLIC BLOOD PRESSURE: 76 MMHG | TEMPERATURE: 99 F | WEIGHT: 293 LBS | HEIGHT: 69 IN | HEART RATE: 87 BPM | BODY MASS INDEX: 43.4 KG/M2 | RESPIRATION RATE: 14 BRPM

## 2023-10-31 DIAGNOSIS — Z71.85 VACCINE COUNSELING: ICD-10-CM

## 2023-10-31 DIAGNOSIS — G89.29 CHRONIC BILATERAL LOW BACK PAIN WITH BILATERAL SCIATICA: ICD-10-CM

## 2023-10-31 DIAGNOSIS — J06.9 URI, ACUTE: ICD-10-CM

## 2023-10-31 DIAGNOSIS — E55.9 VITAMIN D DEFICIENCY: ICD-10-CM

## 2023-10-31 DIAGNOSIS — E11.65 UNCONTROLLED TYPE 2 DIABETES MELLITUS WITH HYPERGLYCEMIA (HCC): Primary | ICD-10-CM

## 2023-10-31 DIAGNOSIS — M54.42 CHRONIC BILATERAL LOW BACK PAIN WITH BILATERAL SCIATICA: ICD-10-CM

## 2023-10-31 DIAGNOSIS — M54.41 CHRONIC BILATERAL LOW BACK PAIN WITH BILATERAL SCIATICA: ICD-10-CM

## 2023-10-31 DIAGNOSIS — R68.89 ILL FEELING: ICD-10-CM

## 2023-10-31 LAB
CONTROL LINE PRESENT WITH A CLEAR BACKGROUND (YES/NO): YES YES/NO
COVID19 BINAX NOW ANTIGEN: NOT DETECTED
KIT LOT #: 7282 NUMERIC
OPERATOR ID: NORMAL
POCT LOT NUMBER: NORMAL
STREP GRP A CUL-SCR: NEGATIVE

## 2023-10-31 PROCEDURE — 3074F SYST BP LT 130 MM HG: CPT | Performed by: INTERNAL MEDICINE

## 2023-10-31 PROCEDURE — 99215 OFFICE O/P EST HI 40 MIN: CPT | Performed by: INTERNAL MEDICINE

## 2023-10-31 PROCEDURE — 3078F DIAST BP <80 MM HG: CPT | Performed by: INTERNAL MEDICINE

## 2023-10-31 PROCEDURE — 87880 STREP A ASSAY W/OPTIC: CPT | Performed by: INTERNAL MEDICINE

## 2023-10-31 PROCEDURE — 3008F BODY MASS INDEX DOCD: CPT | Performed by: INTERNAL MEDICINE

## 2023-10-31 RX ORDER — ORAL SEMAGLUTIDE 3 MG/1
3 TABLET ORAL DAILY
Qty: 30 TABLET | Refills: 1 | Status: SHIPPED | OUTPATIENT
Start: 2023-10-31 | End: 2023-11-30

## 2023-10-31 RX ORDER — MONTELUKAST SODIUM 10 MG/1
10 TABLET ORAL NIGHTLY
Qty: 7 TABLET | Refills: 0 | Status: SHIPPED | OUTPATIENT
Start: 2023-10-31 | End: 2023-11-07

## 2023-10-31 RX ORDER — GABAPENTIN 300 MG/1
600 CAPSULE ORAL NIGHTLY
Qty: 60 CAPSULE | Refills: 1 | Status: SHIPPED | OUTPATIENT
Start: 2023-10-31

## 2023-10-31 NOTE — PROGRESS NOTES
CMP Normal (electrolytes, kidney and liver functions),   Lipid (choilesterol) is better. Goal LDL should be less than 70. Careful with diet. Avoid red meat, shellfish, pork. Try not to roach foods. Lower carb diet. Exercise is most part at least 30 minutes 3-4 times a week sustained cardiovascular aerobic exercise getting that heart rate going and keeping it going for 30 minutes at a time. If cannot do 30 will start with 10 minutes of brisk walking is good at each week build up 5 minutes more. Recheck lipid in 3 months. If taking atorvastatin would increase to 20 mg. If not taking atorvastatin to begin.

## 2023-11-01 LAB
FLUAV + FLUBV RNA SPEC NAA+PROBE: NOT DETECTED
FLUAV + FLUBV RNA SPEC NAA+PROBE: NOT DETECTED
RSV RNA SPEC NAA+PROBE: NOT DETECTED
SARS-COV-2 RNA RESP QL NAA+PROBE: NOT DETECTED

## 2023-11-12 ENCOUNTER — PATIENT MESSAGE (OUTPATIENT)
Dept: INTERNAL MEDICINE CLINIC | Facility: CLINIC | Age: 38
End: 2023-11-12

## 2023-11-13 NOTE — TELEPHONE ENCOUNTER
Dr. Angela Lancaster, Tony Amaro. From: Shaylee Goes  To: Cherylene Mau. Angela Lancaster  Sent: 11/12/2023  6:59 AM CST  Subject: Feeling better. Good morning Dr Angela Lancaster, just wanted to let you know that I am feeling better from the cold I had. The Singulair worked great! Olivier taken the Gabapentin for two weeks now. The first week with taking it my back felt like it was hurting more, I was really uncomfortable and in pain. I had to still take a lot of aspirin (Mal extra strength) and Tylenol. The second week in taking the Gabapentin I noticed that the back pain was still there but only at night time when I took it the pain would alleviate a little and then when waking up and moving around the back pain would hurt again. With taking Rybelsus, I have not experienced any side effects. No stomach upsets or stomach pains that I have noticed. I am taking it every morning first thing after waking up with only a little bit of water and I wait 30 mins to an hour before I take my Lipitor. I am documenting my blood sugars so far to me it looks like my blood sugar in the morning are coming down from the 150 to around 119. Just wanted to give an update on all the new meds and from the cold I had. Have a nice day.      Shaylee Goes

## 2023-11-14 ENCOUNTER — TELEPHONE (OUTPATIENT)
Dept: INTERNAL MEDICINE CLINIC | Facility: CLINIC | Age: 38
End: 2023-11-14

## 2023-11-14 NOTE — TELEPHONE ENCOUNTER
essage # [id number]         2023 06:26p   [EKESHIAP]  To:  From:  ASHLEY Alcantar MD:  Phone#:  ----------------------------------------------------------------------  PT IRIS GOMEZ,  [birth year], RE ALLERGIC REACTION/SWOLLEN EYES,  [phone]  Paged at number :  PAGE: [phone] at :   18:26     Azithromycin Pregnancy And Lactation Text: This medication is considered safe during pregnancy and is also secreted in breast milk.

## 2023-11-14 NOTE — TELEPHONE ENCOUNTER
Pt had allergic reaction   Instructed to try antihistamines  If any worsening sym ER   If mild Sym - call pcp in AM

## 2023-11-16 ENCOUNTER — OFFICE VISIT (OUTPATIENT)
Dept: PODIATRY CLINIC | Facility: CLINIC | Age: 38
End: 2023-11-16
Payer: COMMERCIAL

## 2023-11-16 ENCOUNTER — HOSPITAL ENCOUNTER (OUTPATIENT)
Dept: GENERAL RADIOLOGY | Facility: HOSPITAL | Age: 38
Discharge: HOME | End: 2023-11-16
Attending: STUDENT IN AN ORGANIZED HEALTH CARE EDUCATION/TRAINING PROGRAM
Payer: COMMERCIAL

## 2023-11-16 VITALS — HEART RATE: 74 BPM | SYSTOLIC BLOOD PRESSURE: 114 MMHG | DIASTOLIC BLOOD PRESSURE: 74 MMHG

## 2023-11-16 DIAGNOSIS — M79.672 LEFT FOOT PAIN: ICD-10-CM

## 2023-11-16 DIAGNOSIS — M72.2 PLANTAR FASCIITIS: Primary | ICD-10-CM

## 2023-11-16 PROCEDURE — 3078F DIAST BP <80 MM HG: CPT | Performed by: STUDENT IN AN ORGANIZED HEALTH CARE EDUCATION/TRAINING PROGRAM

## 2023-11-16 PROCEDURE — 3074F SYST BP LT 130 MM HG: CPT | Performed by: STUDENT IN AN ORGANIZED HEALTH CARE EDUCATION/TRAINING PROGRAM

## 2023-11-16 PROCEDURE — 20550 NJX 1 TENDON SHEATH/LIGAMENT: CPT | Performed by: STUDENT IN AN ORGANIZED HEALTH CARE EDUCATION/TRAINING PROGRAM

## 2023-11-16 PROCEDURE — 73630 X-RAY EXAM OF FOOT: CPT | Performed by: STUDENT IN AN ORGANIZED HEALTH CARE EDUCATION/TRAINING PROGRAM

## 2023-11-16 PROCEDURE — 99203 OFFICE O/P NEW LOW 30 MIN: CPT | Performed by: STUDENT IN AN ORGANIZED HEALTH CARE EDUCATION/TRAINING PROGRAM

## 2023-11-16 RX ORDER — TRIAMCINOLONE ACETONIDE 40 MG/ML
40 INJECTION, SUSPENSION INTRA-ARTICULAR; INTRAMUSCULAR ONCE
Status: COMPLETED | OUTPATIENT
Start: 2023-11-16 | End: 2023-11-16

## 2023-11-16 RX ORDER — DEXAMETHASONE SODIUM PHOSPHATE 4 MG/ML
4 VIAL (ML) INJECTION ONCE
Status: COMPLETED | OUTPATIENT
Start: 2023-11-16 | End: 2023-11-16

## 2023-11-16 RX ADMIN — TRIAMCINOLONE ACETONIDE 40 MG: 40 INJECTION, SUSPENSION INTRA-ARTICULAR; INTRAMUSCULAR at 16:55:00

## 2023-11-16 RX ADMIN — DEXAMETHASONE SODIUM PHOSPHATE 4 MG: 4 MG/ML VIAL (ML) INJECTION at 16:55:00

## 2023-11-16 NOTE — PROGRESS NOTES
[facility] Podiatry  Progress Note      Dony Avitia is a 43year old female. Chief Complaint   Patient presents with    Foot Pain     Consult Left foot pain was Dx with Plantar fasciitis, received  cortisone injection on Jun 28/22. Onset on and off pain  Dec 2022. Got worse 7 months ago. Was doing plantar fasciitis exercises . HPI:         Patient is a pleasant 39-year-old female who presents to clinic today for evaluation of left cortisone injection. Patient had a cortisone shot in June which lasted until December of 2022. She admits that the pain is is progressively getting worse. Patient is also currently undergoing a lot of back pain and is taking gabapentin. She also had a Medrol Dosepak for her back pain which did not provide any relief to her back or her foot. She ambulates with supportive shoes. Patient had a plantar fasciotomy to the right foot due to her history of plantar fasciitis. Denies any pain to the right foot today. Allergies: Patient has no known allergies. Current Outpatient Medications   Medication Sig Dispense Refill    Semaglutide (RYBELSUS) 3 MG Oral Tab Take 3 mg by mouth daily. 30 tablet 1    gabapentin 300 MG Oral Cap Take 2 capsules (600 mg total) by mouth nightly. 60 capsule 1    Glucose Blood (ONETOUCH VERIO) In Vitro Strip       atorvastatin 10 MG Oral Tab Take 1 tablet (10 mg total) by mouth nightly. 90 tablet 0    Blood Glucose Monitoring Suppl w/Device Does not apply Kit Dx. E11.65. Checks qam. 1 kit 0    Blood Gluc Meter Disp-Strips Does not apply Device Dx. E11.65. Checks qam. 50 each 11    Lancets Does not apply Misc Dx. E11.65. Checks qam. 50 each 11    Blood Glucose Monitoring Suppl (BLOOD GLUCOSE SYSTEM MEHUL) Does not apply Kit DX E 11.22. 1 kit 0    acetaminophen 500 MG Oral Tab Take 1 tablet (500 mg total) by mouth every 6 (six) hours as needed for Pain.      escitalopram 5 MG Oral Tab Take 1 tablet (5 mg total) by mouth every evening.  27 tablet 2      Past Medical History:   Diagnosis Date    Bartholin's cyst 2013, 2012    Surgical. 2012-Bartholin's abscess, drug therapy    History of Papanicolaou smear of cervix 11/12/2013    Hypercholesterolemia without hypertriglyceridemia     IBS (irritable bowel syndrome)     Lipid screening 04/11/2013    Microhematuria     Dr. Joshua Gonzalez. Morbid obesity with BMI of 40.0-44.9, adult ([address]     Osteoporosis screening 04/16/2012    PVC (premature ventricular contraction)     Symptomatic. Sees Dr. Pam Mendoza at [facility]. Past Surgical History:   Procedure Laterality Date    EGD  8/11    OTHER SURGICAL HISTORY      Bartholin's cyst surgery    OTHER SURGICAL HISTORY  8/1/2011    Excision of left sebacious cyst      Family History   Problem Relation Age of Onset    Diabetes Father     Hypertension Father     Diabetes Paternal Grandmother     Renal Disease Paternal Grandfather     Diabetes Paternal Grandfather     Hypertension Mother     Cancer Maternal Grandfather         stomach    Breast Cancer Maternal Aunt     Colon Cancer Paternal Aunt       Social History     Socioeconomic History    Marital status:    Tobacco Use    Smoking status: Never    Smokeless tobacco: Never   Vaping Use    Vaping Use: Never used   Substance and Sexual Activity    Alcohol use: Yes     Alcohol/week: 0.0 standard drinks of alcohol     Comment: Socially; rarely    Drug use: No    Sexual activity: Yes     Birth control/protection: Injection   Other Topics Concern    Caffeine Concern Yes     Comment: Soda           REVIEW OF SYSTEMS:     Denies nause, fever, chills  No calf pain  Denies chest pain or SOB      EXAM:   LMP 09/30/2023 (Approximate)   GENERAL: well developed, well nourished, in no apparent distress  EXTREMITIES:   1. Integument: Normal skin temperature and turgor  2.  Vascular: Dorsalis pedis two out of four bilateral and posterior tibial pulses two out of   four bilateral, capillary refill normal.   3. Musculoskeletal:  Pain with palpation to left medial calcaneal tubercle. 4. Neurological: Normal sharp dull sensation; reflexes normal.             ASSESSMENT AND PLAN:   Diagnoses and all orders for this visit:    Left foot pain  -     XR FOOT, COMPLETE (MIN 3 VIEWS), LEFT (CPT=73630); Future        Plan:     -Patient examined, chart history reviewed. -Discussed etiology of patient's condition and various treatment options.  -Discussed importance of proper shoe gear and orthotics. Patient to avoid walking barefoot at all times.  -Discussed importance of stretching exercises. Patient to aim to stretch 3-5 times daily.  -Discussed steroid injection with patient including benefits and risks. Patient agrees to injection and written consent was obtained. -After prepping site with alcohol, administered steroid injection to left plantar heel consisting of 1 cc of 1% lidocaine plain, 1 cc of dexamethasone, and  1 cc of Kenalog 40. Patient tolerated the injection well and there were no complications. Site was dressed with Band-Aid.  -Wear supportive shoe gear and inserts at all times with ambulation. Avoid walking barefoot. - Perform stretching exercises 3-5 times daily. Instructions dispensed. - Monitor response to steroid injection.  - Follow-up in 2 months for reevaluation. The patient indicates understanding of these issues and agrees to the plan.         Jaya Schneider DPM

## 2023-11-21 ENCOUNTER — TELEPHONE (OUTPATIENT)
Dept: INTERNAL MEDICINE CLINIC | Facility: CLINIC | Age: 38
End: 2023-11-21

## 2023-11-21 DIAGNOSIS — E11.65 UNCONTROLLED TYPE 2 DIABETES MELLITUS WITH HYPERGLYCEMIA (HCC): Primary | ICD-10-CM

## 2023-11-21 RX ORDER — ORAL SEMAGLUTIDE 7 MG/1
7 TABLET ORAL DAILY
Qty: 30 TABLET | Refills: 3 | Status: SHIPPED | OUTPATIENT
Start: 2023-11-21 | End: 2023-12-21

## 2023-11-21 NOTE — TELEPHONE ENCOUNTER
Pharmacy called in for ICD10 code for Rx rybelsus [address] Please advise       Current Outpatient Medications   Medication Sig Dispense Refill       1    Semaglutide (RYBELSUS) 7 MG Oral Tab Take 7 mg by mouth daily.  30 tablet 3

## 2023-12-05 ENCOUNTER — HOSPITAL ENCOUNTER (OUTPATIENT)
Dept: MRI IMAGING | Age: 38
Discharge: HOME | End: 2023-12-05
Attending: INTERNAL MEDICINE
Payer: COMMERCIAL

## 2023-12-05 DIAGNOSIS — M54.42 CHRONIC BILATERAL LOW BACK PAIN WITH BILATERAL SCIATICA: ICD-10-CM

## 2023-12-05 DIAGNOSIS — G89.29 CHRONIC BILATERAL LOW BACK PAIN WITH BILATERAL SCIATICA: ICD-10-CM

## 2023-12-05 DIAGNOSIS — M54.41 CHRONIC BILATERAL LOW BACK PAIN WITH BILATERAL SCIATICA: ICD-10-CM

## 2023-12-05 PROCEDURE — 72148 MRI LUMBAR SPINE W/O DYE: CPT | Performed by: INTERNAL MEDICINE

## 2023-12-12 RX ORDER — ATORVASTATIN CALCIUM 10 MG/1
10 TABLET, FILM COATED ORAL NIGHTLY
Qty: 90 TABLET | Refills: 3 | Status: SHIPPED | OUTPATIENT
Start: 2023-12-12

## 2023-12-12 NOTE — TELEPHONE ENCOUNTER
Refill passed per [facility] protocol.     Requested Prescriptions   Pending Prescriptions Disp Refills    atorvastatin 10 MG Oral Tab 90 tablet 0     Sig: Take 1 tablet (10 mg total) by mouth nightly.       Cholesterol Medication Protocol Passed - 12/11/2023  7:26 AM        Passed - ALT in past 12 months        Passed - LDL in past 12 months        Passed - Last ALT < 80     Lab Results   Component Value Date    ALT 34 10/27/2023             Passed - Last LDL < 130     Lab Results   Component Value Date    LDL 82 10/27/2023             Passed - In person appointment or virtual visit in the past 12 mos or appointment in next 3 mos     Recent Outpatient Visits              3 weeks ago Plantar fasciitis    [facility] Dee Fountain DPM    Office Visit    1 month ago Uncontrolled type 2 diabetes mellitus with hyperglycemia (HCC)    [facility] Deidre Gustafson MD    Office Visit    1 month ago Depo-Provera contraceptive status    [facility] - OB/GYN    Nurse Only    1 month ago     [facility] Rachel Leonardo, PT    Office Visit    1 month ago     [facility]    Office Visit          Future Appointments         Provider Department Appt Notes    In 1 week Christophe Mixon DO [facility] rfd- Dr Gustafson- back pain- bcbs ppo    In 1 month [facility]GYN RN [facility] - OB/GYN depo 1/12-1/26    In 1 month Dee Fountain DPM [facility] 2 mo followup    In 2 months Marco Winters MD [facility] [employer] PPO  NON SX F/U    In 6 months Deidre Gustafson MD [facility]  isael                    [unfilled]      [unfilled]

## 2023-12-21 ENCOUNTER — OFFICE VISIT (OUTPATIENT)
Dept: PHYSICAL MEDICINE AND REHAB | Facility: CLINIC | Age: 38
End: 2023-12-21
Payer: COMMERCIAL

## 2023-12-21 VITALS — BODY MASS INDEX: 42.36 KG/M2 | WEIGHT: 286 LBS | HEIGHT: 69 IN

## 2023-12-21 DIAGNOSIS — M47.816 LUMBAR SPONDYLOSIS: Primary | ICD-10-CM

## 2023-12-21 DIAGNOSIS — E11.65 UNCONTROLLED TYPE 2 DIABETES MELLITUS WITH HYPERGLYCEMIA (HCC): ICD-10-CM

## 2023-12-21 PROCEDURE — 3008F BODY MASS INDEX DOCD: CPT | Performed by: PHYSICAL MEDICINE & REHABILITATION

## 2023-12-21 PROCEDURE — 99204 OFFICE O/P NEW MOD 45 MIN: CPT | Performed by: PHYSICAL MEDICINE & REHABILITATION

## 2023-12-21 RX ORDER — GABAPENTIN 300 MG/1
CAPSULE ORAL
Qty: 60 CAPSULE | Refills: 0 | OUTPATIENT
Start: 2023-12-21

## 2023-12-21 RX ORDER — LANCETS 30 GAUGE
EACH MISCELLANEOUS
Qty: 100 EACH | Refills: 3 | Status: SHIPPED | OUTPATIENT
Start: 2023-12-21

## 2023-12-21 RX ORDER — MELOXICAM 15 MG/1
15 TABLET ORAL DAILY
Qty: 30 TABLET | Refills: 0 | Status: SHIPPED | OUTPATIENT
Start: 2023-12-21

## 2023-12-21 NOTE — PROGRESS NOTES
NEW PATIENT VISIT    CHIEF COMPLAINT  Low back pain    HISTORY OF PRESENTING ILLNESS  Juanita Mccarty is a 43year old female who presents for evaluation of low back pain. Patient complains of chronic axial low back pain which started a after some work injuries while lifting, these improved with PT and time. She state that after this she was noting pain worsening after changing to a sitting position. This was without inciting manage injury. She states this most recent pain is the most bothersome and is limiting for her. She will try and alleviate this with forward but eventually will need to straighten up and her pain will return. She has pain with extension. She has tried different desk chairs without success. Pain is located primarily in the axial low back with radiation to the posterior left hip and gluteal region. She is completed about 6 sessions of physical therapy which has provided temporary relief and she continues her home exercises with again temporary relief. Exacerbating activities include prolonged walking, prolonged standing. Medications: Gabapentin 900 mg daily with some relief, also using intermittent ibuprofen and Tylenol at lower doses. Imaging completed MRI of the lumbar spine dated 12/5/2023 was reviewed below. Red flags: None    PAST MEDICAL HISTORY  Past Medical History:   Diagnosis Date    Bartholin's cyst 2013, 2012    Surgical. 2012-Bartholin's abscess, drug therapy    History of Papanicolaou smear of cervix 11/12/2013    Hypercholesterolemia without hypertriglyceridemia     IBS (irritable bowel syndrome)     Lipid screening 04/11/2013    Microhematuria     Dr. Clinton Man. Morbid obesity with BMI of 40.0-44.9, adult ([address]     Osteoporosis screening 04/16/2012    PVC (premature ventricular contraction)     Symptomatic. Sees Dr. Ashwini Clements at [facility].         PAST SURGICAL HISTORY  Past Surgical History:   Procedure Laterality Date    EGD  8/11    OTHER SURGICAL HISTORY      Bartholin's cyst surgery    OTHER SURGICAL HISTORY  8/1/2011    Excision of left sebacious cyst       MEDICATIONS  Current Outpatient Medications on File Prior to Visit   Medication Sig Dispense Refill    gabapentin 300 MG Oral Cap 1 po qam and 2 po at bedtime. 90 capsule 1    atorvastatin 10 MG Oral Tab Take 1 tablet (10 mg total) by mouth nightly. 90 tablet 3    Semaglutide (RYBELSUS) 7 MG Oral Tab Take 7 mg by mouth daily. 30 tablet 3    escitalopram 5 MG Oral Tab Take 1 tablet (5 mg total) by mouth every evening. 30 tablet 2    Glucose Blood (ONETOUCH VERIO) In Vitro Strip       Blood Glucose Monitoring Suppl w/Device Does not apply Kit Dx. E11.65. Checks qam. 1 kit 0    Blood Gluc Meter Disp-Strips Does not apply Device Dx. E11.65. Checks qam. 50 each 11    Lancets Does not apply Misc Dx. E11.65. Checks qam. 50 each 11    Blood Glucose Monitoring Suppl (BLOOD GLUCOSE SYSTEM MEHUL) Does not apply Kit DX E 11.22. 1 kit 0    acetaminophen 500 MG Oral Tab Take 1 tablet (500 mg total) by mouth every 6 (six) hours as needed for Pain. Current Facility-Administered Medications on File Prior to Visit   Medication Dose Route Frequency Provider Last Rate Last Admin    medroxyPROGESTERone Acetate STEVE 150 mg  150 mg Intramuscular Q3 Months Deshawn Perez MD   150 mg at 10/27/23 8501    medroxyPROGESTERone Acetate STEVE 150 mg  150 mg Intramuscular Q3 Months Deshawn Perez MD   150 mg at 12/14/22 1349       ALLERGIES  No Known Allergies    SOCIAL HISTORY   reports that she has never smoked. She has never used smokeless tobacco. She reports current alcohol use. She reports that she does not use drugs.     FAMILY HISTORY  Family History   Problem Relation Age of Onset    Diabetes Father     Hypertension Father     Diabetes Paternal Grandmother     Renal Disease Paternal Grandfather     Diabetes Paternal Grandfather     Hypertension Mother     Cancer Maternal Grandfather         stomach    Breast Cancer Maternal Aunt     Colon Cancer Paternal Aunt        REVIEW OF SYSTEMS  Complete review of systems was performed and was negative except for those items stated in the History of Presenting Illness and Past Medical/Surgical History. PHYSICAL EXAMINATION  GENERAL:  In no acute distress. Well-developed and well nourished. SKIN: No rashes or open wounds involving posterior torso, posterior pelvis, lower extremities. NEUROLOGIC:   Strength: 5/5 bilaterally with hip flexion, knee extension, knee flexion, ankle dorsiflexion, ankle eversion, ankle inversion, ankle plantarflexion, and great toe extension. Sensation: intact light touch sensation throughout both lower extremities. Reflexes: intact and symmetric in bilateral lower extremities. Babinski downgoing bilaterally. No clonus. Gait: able to heel walk, toe walk, and perform tandem gait. MUSCULOSKELETAL:  Inspection: Normal alignment of lumbar spine. No shift or scoliosis. Normal posture. Equal iliac crest heights. No pelvic asymmetry. Palpation: Tenderness palpation of lumbar paraspinal musculature with some extension to the gluteal muscles. ROM: Active lumbar spine ROM is full in flexion without pain, limited extension with exacerbation of axial low back pain, facet loading is positive bilaterally. Passive bilateral hip ROM is full and pain-free. Special Tests:   Slump sit: Negative      REVIEW OF PRIOR X-RAYS/STUDIES  Independently reviewed the MRI of the lumbar spine dated 12/5/2023 which reveals some facet arthropathy at L4-5 and L5-S1 there is very minimal left neuroforaminal stenosis at L5-S1 significant neuroforaminal compromise. There is facet increase signal on T2 weighted imaging and bilateral L5 and S1 facet joints consistent with synovitis. IMPRESSION/DIAGNOSIS  Encounter Diagnosis   Name Primary? Lumbar spondylosis Yes       TREATMENT/PLAN  Patient's presentation consistent with facetogenic pain.   We discussed facet intervention and she would like to avoid medial branch blocks and radiofrequency ablation, she is interested in facet steroid injections. I have ordered bilateral L5-S1 facet steroid injections. Additionally patient with started in physical therapy working on lumbar spine stabilization and core strengthening. She will continue to work on weight loss as well as increasing physical exercise. I prescribed the patient meloxicam to replace ibuprofen for the next 2 weeks as well as instructed on proper dosing of Tylenol for pain control throughout the day. She will follow-up with me for the above injection and then 2 weeks after. Education was provided regarding the above impression/diagnosis and treatment options/plan were discussed. All questions were answered during today's visit. Patient will contact clinic if any other questions or concerns.           [facility] DO  Physical Medicine and Rehabilitation  Interventional Spine and Sports Medicine   [address] [facility]

## 2023-12-21 NOTE — PATIENT INSTRUCTIONS
Take meloxicam in the morning for the next two weeks. Take Tylenol 1000mg three times a day    Get started in PT    We will call to schedule facet steroid injections with fluoroscopic guidance.

## 2023-12-22 NOTE — TELEPHONE ENCOUNTER
Refill passed per [facility] protocol.     Requested Prescriptions   Pending Prescriptions Disp Refills    Lancets Does not apply Misc 50 each 11     Sig: Dx. E11.65. Checks qam.       Diabetic Supplies Protocol Passed - 12/21/2023  9:27 AM        Passed - In person appointment or virtual visit in the past 12 mos or appointment in next 3 mos     Recent Outpatient Visits              Today Lumbar spondylosis    [facility] Christophe Mixon DO    Office Visit    1 month ago Plantar fasciitis    [facility] Dee Fountain DPM    Office Visit    1 month ago Uncontrolled type 2 diabetes mellitus with hyperglycemia (HCC)    [facility] Deidre Gustafson MD    Office Visit    1 month ago Depo-Provera contraceptive status    [facility] - OB[facility]    Nurse Only    2 months ago     [facility] Rachel Leonardo, JORI    Office Visit          Future Appointments         Provider Department Appt Notes    In 3 weeks EC [facility] OB/GYN RN [facility] - OB[facility]N depo 1/12-1/26    In 3 weeks Dee Fountain DPM [facility] 2 mo followup    In 2 months Marco Winters MD [facility] BCBS PPO  NON SX F/U    In 5 months Deidre Gustafson MD [facility] px

## 2023-12-22 NOTE — TELEPHONE ENCOUNTER
Refill passed per [facility] protocol.     Requested Prescriptions   Pending Prescriptions Disp Refills    GABAPENTIN 300 MG Oral Cap [Pharmacy Med Name: Gabapentin 300 Mg Cap Nort] 60 capsule 0     Sig: Take 2 capsules (600 mg total) by mouth nightly.       Neurology Medications Passed - 12/21/2023  9:26 AM        Passed - In person appointment or virtual visit in the past 6 mos or appointment in next 3 mos     Recent Outpatient Visits              Today Lumbar spondylosis    [facility] Christophe Mixon DO    Office Visit    1 month ago Plantar fasciitis    [facility] Dee Fountain DPM    Office Visit    1 month ago Uncontrolled type 2 diabetes mellitus with hyperglycemia (HCC)    [facility] Deidre Gustafson MD    Office Visit    1 month ago Depo-Provera contraceptive status    [facility] - [facility]    Nurse Only    2 months ago     [facility] Rachel Leonardo, JORI    Office Visit          Future Appointments         Provider Department Appt Notes    In 3 weeks EC [facility]OB OB/GYN RN [facility] - OB/GYN depo 1/12-1/26    In 3 weeks Dee Fountain DPM [facility] 2 mo followup    In 2 months Marco Winters MD [facility] BCBS PPO  NON SX F/U    In 5 months Deidre Gustafson MD [facility] px

## 2023-12-27 ENCOUNTER — TELEPHONE (OUTPATIENT)
Dept: PHYSICAL MEDICINE AND REHAB | Facility: CLINIC | Age: 38
End: 2023-12-27

## 2023-12-27 DIAGNOSIS — M47.816 LUMBAR SPONDYLOSIS: Primary | ICD-10-CM

## 2023-12-27 NOTE — TELEPHONE ENCOUNTER
Initiated authorization for L5/S1 Bilateral facet joint injections, fluoroscopic guidance CPT 44464-17 dx:M47.816 to be done at [facility] with [employer]  Status: Approved-authorization is not required per health plan

## 2024-01-15 ENCOUNTER — NURSE ONLY (OUTPATIENT)
Dept: OBGYN CLINIC | Facility: CLINIC | Age: 39
End: 2024-01-15
Payer: COMMERCIAL

## 2024-01-15 DIAGNOSIS — Z30.42 ENCOUNTER FOR DEPO-PROVERA CONTRACEPTION: Primary | ICD-10-CM

## 2024-01-15 PROCEDURE — 81025 URINE PREGNANCY TEST: CPT | Performed by: OBSTETRICS & GYNECOLOGY

## 2024-01-15 RX ORDER — MEDROXYPROGESTERONE ACETATE 150 MG/ML
150 INJECTION, SUSPENSION INTRAMUSCULAR ONCE
Status: COMPLETED | OUTPATIENT
Start: 2024-01-15 | End: 2024-01-15

## 2024-01-15 RX ADMIN — MEDROXYPROGESTERONE ACETATE 150 MG: 150 INJECTION, SUSPENSION INTRAMUSCULAR at 13:28:00

## 2024-01-16 ENCOUNTER — OFFICE VISIT (OUTPATIENT)
Dept: PODIATRY CLINIC | Facility: CLINIC | Age: 39
End: 2024-01-16
Payer: COMMERCIAL

## 2024-01-16 VITALS — HEART RATE: 97 BPM | SYSTOLIC BLOOD PRESSURE: 134 MMHG | DIASTOLIC BLOOD PRESSURE: 83 MMHG

## 2024-01-16 DIAGNOSIS — M72.2 PLANTAR FASCIITIS: Primary | ICD-10-CM

## 2024-01-16 PROCEDURE — 3079F DIAST BP 80-89 MM HG: CPT | Performed by: STUDENT IN AN ORGANIZED HEALTH CARE EDUCATION/TRAINING PROGRAM

## 2024-01-16 PROCEDURE — 20550 NJX 1 TENDON SHEATH/LIGAMENT: CPT | Performed by: STUDENT IN AN ORGANIZED HEALTH CARE EDUCATION/TRAINING PROGRAM

## 2024-01-16 PROCEDURE — 3075F SYST BP GE 130 - 139MM HG: CPT | Performed by: STUDENT IN AN ORGANIZED HEALTH CARE EDUCATION/TRAINING PROGRAM

## 2024-01-16 RX ORDER — DEXAMETHASONE SODIUM PHOSPHATE 4 MG/ML
4 VIAL (ML) INJECTION ONCE
Status: COMPLETED | OUTPATIENT
Start: 2024-01-16 | End: 2024-01-16

## 2024-01-16 RX ORDER — TRIAMCINOLONE ACETONIDE 40 MG/ML
40 INJECTION, SUSPENSION INTRA-ARTICULAR; INTRAMUSCULAR ONCE
Status: COMPLETED | OUTPATIENT
Start: 2024-01-16 | End: 2024-01-16

## 2024-01-16 RX ADMIN — TRIAMCINOLONE ACETONIDE 40 MG: 40 INJECTION, SUSPENSION INTRA-ARTICULAR; INTRAMUSCULAR at 17:18:00

## 2024-01-16 RX ADMIN — DEXAMETHASONE SODIUM PHOSPHATE 4 MG: 4 MG/ML VIAL (ML) INJECTION at 16:50:00

## 2024-01-16 NOTE — PROGRESS NOTES
[facility] Podiatry  Progress Note      Kitty Hamm is a 38 year old female.   Chief Complaint   Patient presents with    Follow - Up     Left heel pain f/u-Last visit 11/16/23 had cortisone injection left heel. Pt states injection helped, but pain started to return almost 3 wks ago. Pt rates her pain 4/10 when standing/walking and 2/10 at rest.              HPI:     Patient is a pleasant 38-year-old female presents to clinic today for follow-up of left heel pain.  She admits that the previous injection received in November 2023 provided her with relief however approximately 3 weeks ago the pain came back.  Patient would like another cortisone injection today.  Patient has had some orthotics.  Patient has been performing lower extremity stretching and icing    Allergies: Patient has no known allergies.    Current Outpatient Medications   Medication Sig Dispense Refill    Lancets Does not apply Misc Dx. E11.65. Checks qam. 100 each 3    Meloxicam 15 MG Oral Tab Take 1 tablet (15 mg total) by mouth daily. 30 tablet 0    atorvastatin 10 MG Oral Tab Take 1 tablet (10 mg total) by mouth nightly. 90 tablet 3    Glucose Blood (ONETOUCH VERIO) In Vitro Strip       Blood Glucose Monitoring Suppl w/Device Does not apply Kit Dx. E11.65. Checks qam. 1 kit 0    Blood Gluc Meter Disp-Strips Does not apply Device Dx. E11.65. Checks qam. 50 each 11    Blood Glucose Monitoring Suppl (BLOOD GLUCOSE SYSTEM MEHUL) Does not apply Kit DX E 11.22. 1 kit 0    acetaminophen 500 MG Oral Tab Take 1 tablet (500 mg total) by mouth every 6 (six) hours as needed for Pain.      Semaglutide (RYBELSUS) 7 MG Oral Tab Take 7 mg by mouth daily. 30 tablet 3      Past Medical History:   Diagnosis Date    Bartholin's cyst 2013, 2012    Surgical. 2012-Bartholin's abscess, drug therapy    History of Papanicolaou smear of cervix 11/12/2013    Hypercholesterolemia without hypertriglyceridemia     IBS (irritable bowel syndrome)     Lipid screening  04/11/2013    Microhematuria     Dr. Rajan.     Morbid obesity with BMI of 40.0-44.9, adult ([facility])     Osteoporosis screening 04/16/2012    PVC (premature ventricular contraction)     Symptomatic. Sees Dr. Baum at [facility].       Past Surgical History:   Procedure Laterality Date    EGD  8/11    OTHER SURGICAL HISTORY      Bartholin's cyst surgery    OTHER SURGICAL HISTORY  8/1/2011    Excision of left sebacious cyst      Family History   Problem Relation Age of Onset    Diabetes Father     Hypertension Father     Diabetes Paternal Grandmother     Hypertension Paternal Grandmother     Renal Disease Paternal Grandfather     Diabetes Paternal Grandfather     Hypertension Mother     Cancer Maternal Grandfather         stomach    Breast Cancer Maternal Aunt     Colon Cancer Paternal Aunt     Dementia Maternal Grandmother     Hypertension Brother       Social History     Socioeconomic History    Marital status:    Tobacco Use    Smoking status: Never    Smokeless tobacco: Never   Vaping Use    Vaping Use: Never used   Substance and Sexual Activity    Alcohol use: Yes     Comment: Special occassions    Drug use: No    Sexual activity: Yes     Birth control/protection: Injection   Other Topics Concern    Caffeine Concern Yes     Comment: Soda           REVIEW OF SYSTEMS:     Denies nause, fever, chills  No calf pain  Denies chest pain or SOB      EXAM:   LMP 09/30/2023 (Approximate)   GENERAL: well developed, well nourished, in no apparent distress  EXTREMITIES:   1. Integument: Normal skin temperature and turgor  2. Vascular: Dorsalis pedis two out of four bilateral and posterior tibial pulses two out of   four bilateral, capillary refill normal.   3. Musculoskeletal: All muscle groups are graded 5 out of 5 in the foot and ankle.  Pain with palpation to left medial calcaneal tubercle   4. Neurological: Normal sharp dull sensation; reflexes normal.             ASSESSMENT AND PLAN:   Diagnoses and all orders for this  visit:    Plantar fasciitis  -     dexamethasone (Decadron) 4 MG/ML injection 4 mg  -     triamcinolone acetonide (Kenalog-40) 40 MG/ML injection 40 mg        Plan:     -Patient examined, chart history reviewed.  -Discussed etiology of patient's condition and various treatment options.  -Discussed importance of proper shoe gear and orthotics.  Patient to avoid walking barefoot at all times.  -Discussed importance of stretching exercises.  Patient to aim to stretch 3-5 times daily.  -Discussed steroid injection with patient including benefits and risks.  Patient agrees to injection and written consent was obtained.  -After prepping site with alcohol, administered steroid injection to left plantar heel consisting of 1 cc of 1% lidocaine plain, 1 cc of dexamethasone, and  1 cc of Kenalog 40.  Patient tolerated the injection well and there were no complications.  Site was dressed with Band-Aid.  -Wear supportive shoe gear and inserts at all times with ambulation.  Avoid walking barefoot.  - Perform stretching exercises 3-5 times daily.  Instructions dispensed.  - Monitor response to steroid injection.  - Follow-up in 2 months for reevaluation.      The patient indicates understanding of these issues and agrees to the plan.        Dee Fountain DPM

## 2024-01-17 NOTE — TELEPHONE ENCOUNTER
Patient has new [facility] insurance Member ID #[id number]    Initiated authorization for L5/S1 Bilateral facet joint injections, fluoroscopic guidance CPT 90993-73 with case [facility]ough[facility]  Reference #[id number]  Status:Approved-authorization is not required per health plan

## 2024-01-22 RX ORDER — MELOXICAM 15 MG/1
15 TABLET ORAL DAILY
Qty: 30 TABLET | Refills: 0 | Status: SHIPPED | OUTPATIENT
Start: 2024-01-22

## 2024-01-22 NOTE — TELEPHONE ENCOUNTER
Refill Request    Medication request: Meloxicam 15 MG Oral Tab.  Take 1 tablet (15 mg total) by mouth daily.      LOV:12/21/2023 Christophe Mixon DO   Due back to clinic per last office note:  he will follow-up with me for the above injection and then 2 weeks after.   NOV: 3/5/2024 Christophe Mixon DO      [place]: 12/21/2023 #30 (30 days)    Urine drug screen (if applicable): N/A  Pain contract: N/A    LOV plan (if weaning or changing medications): I prescribed the patient meloxicam to replace ibuprofen for the next 2 weeks as well as instructed on proper dosing of Tylenol for pain control throughout the day.

## 2024-01-22 NOTE — TELEPHONE ENCOUNTER
Patient has been scheduled for L5/S1 Bilateral facet joint injections, fluoroscopic guidance, IVCS  on 2/20/2024 at the [facility] with Dr. Mixon  -Anesthesia type: IVCS  -Patient was advised that if he/she does receive the covid vaccine it needs to be at least 2 weeks before or after the injection.  -Medications and allergies reviewed.  -Patient reminded to hold NSAIDs (Ibuprofen, ASA 81, Aleve, Naproxen, Mobic, Diclofenac, Etodolac, Celebrex etc.) for 3 days prior to Lumbar MBB/Facet if BMI is greater than 35. For Cervical injections only hold multivitamins, Vitamin E, Fish Oil, Phentermine/Lomaira for 7 days prior to injection and NSAIDS.   mg to be held for 7 days prior to injections.  -If patient is receiving MAC/IVCS, weight loss oral/injectable medications will need to be held for 7 days prior to injection.  -Patient informed to fast 12 hours prior to procedure with IVCS/MAC.   -If on blood thinner, clearance has been received and approved to hold this medication by provider.   -Patient informed of [facility]'s  policy:  he/she will need a  to and from procedure and must be on site for their entirety of their visit, if their ride is unable to the procedure will be cancelled.   -[facility] is located in the [facility] 1st floor,  may park in the yellow/purple parking lot.  Patient verbalized understanding and agrees with plan.  Scheduled in Epic: Yes  Scheduled in Surgical Case: Yes  Follow up appointment made: NOV: 3/5/2024 Christophe Mixon DO

## 2024-02-05 ENCOUNTER — PATIENT MESSAGE (OUTPATIENT)
Dept: PHYSICAL MEDICINE AND REHAB | Facility: CLINIC | Age: 39
End: 2024-02-05

## 2024-02-06 NOTE — TELEPHONE ENCOUNTER
From: Kitty Hamm  To: Christophe Mixon  Sent: 2/5/2024 7:26 PM CST  Subject: Surgery     Hi, so I been waiting all day today to receive a phone call to know at what time my surgery is tomorrow 2/6 and I never received the phone call. It was after 6pm today that I started contacting the outpatient surgery department at [facility] to know why I haven’t received a phone call and no one ever answered. Then I checked [facility] and noticed that my surgery was changed to a different date 2/20. Can someone please call me to discuss what is happening and how no one is notifying me that my appointment changed again.

## 2024-03-04 ENCOUNTER — OFFICE VISIT (OUTPATIENT)
Dept: INTERNAL MEDICINE CLINIC | Facility: CLINIC | Age: 39
End: 2024-03-04
Payer: COMMERCIAL

## 2024-03-04 VITALS
BODY MASS INDEX: 41.83 KG/M2 | SYSTOLIC BLOOD PRESSURE: 106 MMHG | HEIGHT: 69 IN | HEART RATE: 82 BPM | DIASTOLIC BLOOD PRESSURE: 68 MMHG | WEIGHT: 282.38 LBS | TEMPERATURE: 99 F

## 2024-03-04 DIAGNOSIS — J06.9 ACUTE URI: Primary | ICD-10-CM

## 2024-03-04 PROCEDURE — 3008F BODY MASS INDEX DOCD: CPT | Performed by: INTERNAL MEDICINE

## 2024-03-04 PROCEDURE — 3074F SYST BP LT 130 MM HG: CPT | Performed by: INTERNAL MEDICINE

## 2024-03-04 PROCEDURE — 99213 OFFICE O/P EST LOW 20 MIN: CPT | Performed by: INTERNAL MEDICINE

## 2024-03-04 PROCEDURE — 3078F DIAST BP <80 MM HG: CPT | Performed by: INTERNAL MEDICINE

## 2024-03-04 RX ORDER — MONTELUKAST SODIUM 4 MG/1
4 TABLET, CHEWABLE ORAL DAILY
Qty: 20 TABLET | Refills: 0 | Status: SHIPPED | OUTPATIENT
Start: 2024-03-04 | End: 2025-02-27

## 2024-03-04 NOTE — PROGRESS NOTES
Kitty Hamm is a 38 year old female.   Chief Complaint   Patient presents with    Sinus Problem    Sore Throat     HPI:   Since Saturday, 2 days ago, she has had symptoms of fatigue, nasal congestion with yellow drainage, sinus pressure and pain, \"clicking\" in her left ear and a sore throat.  No fever.  No ear pain.  No cough or shortness of breath.  She took Zyrtec yesterday without relief.  Home COVID testing was negative.  Occasionally, pseudoephedrine will not relieve symptoms although she has not taken pseudoephedrine with this illness.  She has had good relief from montelukast in the past.    History of type 2 diabetes and dyslipidemia.  Medications reviewed, as listed below.  No medication allergies.  Current Outpatient Medications   Medication Sig Dispense Refill    montelukast 4 MG Oral Chew Tab Chew 1 tablet (4 mg total) by mouth daily. 20 tablet 0    Meloxicam 15 MG Oral Tab Take 1 tablet (15 mg total) by mouth daily. 30 tablet 0    Lancets Does not apply Misc Dx. E11.65. Checks qam. 100 each 3    atorvastatin 10 MG Oral Tab Take 1 tablet (10 mg total) by mouth nightly. 90 tablet 3    Glucose Blood (ONETOUCH VERIO) In Vitro Strip       Blood Glucose Monitoring Suppl w/Device Does not apply Kit Dx. E11.65. Checks qam. 1 kit 0    Blood Gluc Meter Disp-Strips Does not apply Device Dx. E11.65. Checks qam. 50 each 11    Blood Glucose Monitoring Suppl (BLOOD GLUCOSE SYSTEM MEHUL) Does not apply Kit DX E 11.22. 1 kit 0    acetaminophen 500 MG Oral Tab Take 1 tablet (500 mg total) by mouth every 6 (six) hours as needed for Pain.      Semaglutide (RYBELSUS) 7 MG Oral Tab Take 7 mg by mouth daily. 30 tablet 3     No Known Allergies   Past Medical History:   Diagnosis Date    Bartholin's cyst 2013, 2012    Surgical. 2012-Bartholin's abscess, drug therapy    Diabetes (HCC)     High cholesterol     History of Papanicolaou smear of cervix 11/12/2013    Hypercholesterolemia without hypertriglyceridemia     IBS  (irritable bowel syndrome)     Lipid screening 04/11/2013    Microhematuria     Dr. Rajan.     Morbid obesity with BMI of 40.0-44.9, adult ([facility])     Osteoporosis screening 04/16/2012    PVC (premature ventricular contraction)     Symptomatic. Sees Dr. Baum at [facility].       Social History:  Social History     Socioeconomic History    Marital status:    Tobacco Use    Smoking status: Never    Smokeless tobacco: Never   Vaping Use    Vaping Use: Never used   Substance and Sexual Activity    Alcohol use: Yes     Comment: Special occassions    Drug use: No    Sexual activity: Yes     Birth control/protection: Injection   Other Topics Concern    Caffeine Concern Yes     Comment: Soda        EXAM:   GENERAL: Pleasant female appearing well and breathing comfortably in no distress  /68 (BP Location: Right arm, Patient Position: Sitting, Cuff Size: large)   Pulse 82   Temp 98.9 °F (37.2 °C) (Temporal)   Ht 5' 9\" (1.753 m)   Wt 282 lb 6.4 oz (128.1 kg)   LMP 09/30/2023 (Approximate)   BMI 41.70 kg/m²   HEENT: Anicteric, conjunctiva pink, canals and TMs normal bilaterally, bilateral maxillary and bilateral frontal sinus tenderness, oropharynx normal without erythema ulceration swelling or exudate  NECK: Supple without mass or lymphadenopathy  LUNGS: Resonant to percussion and clear to auscultation without crackles or wheezes    ASSESSMENT AND PLAN:   1. Acute URI  Likely viral.  Symptomatic treatment-pseudoephedrine and singular 10 mg daily as needed.  Prescription for generic Singulair sent to pharmacy  Call if not soon better. Would then consider an empiric course of antibiotics.    The patient indicates understanding of these issues and agrees to the plan.    Dieudonne Kelley MD  3/4/2024  4:10 PM

## 2024-03-05 ENCOUNTER — TELEMEDICINE (OUTPATIENT)
Dept: PHYSICAL MEDICINE AND REHAB | Facility: CLINIC | Age: 39
End: 2024-03-05
Payer: COMMERCIAL

## 2024-03-05 DIAGNOSIS — M47.816 LUMBAR SPONDYLOSIS: Primary | ICD-10-CM

## 2024-03-05 DIAGNOSIS — M54.50 MYOFASCIAL LOW BACK PAIN: ICD-10-CM

## 2024-03-05 PROCEDURE — 99214 OFFICE O/P EST MOD 30 MIN: CPT | Performed by: PHYSICAL MEDICINE & REHABILITATION

## 2024-03-05 RX ORDER — CYCLOBENZAPRINE HCL 10 MG
10 TABLET ORAL NIGHTLY
Qty: 30 TABLET | Refills: 0 | Status: SHIPPED | OUTPATIENT
Start: 2024-03-05

## 2024-03-05 NOTE — PROGRESS NOTES
[facility]    Telemedicine Visit - Follow Up Evaluation    Telehealth Verbal Consent   I conducted a telehealth visit with Kitty Hamm today, 03/05/24, which was completed using two-way, real-time interactive audio and video communication.The patient was made aware of the limitations of the telehealth visit, including treatment limitations as no physical exam could be performed.  The patient was advised to call 911 or to go to the ER in case there was an emergency.  The patient was also advised of the potential privacy & security concerns related to the telehealth platform.   The patient was made aware of where to find [facility]'s notice of privacy practices, telehealth consent form and other related consent forms and documents.  which are located on the [facility] website. The patient verbally agreed to telehealth consent form, related consents and the risks discussed.    Lastly, the patient confirmed that they were in [place].   Included in this visit, time may have been spent reviewing labs, medications, radiology tests and decision making. Appropriate medical decision-making and tests are ordered as detailed in the plan of care above.  Coding/billing information is submitted for this visit based on complexity of care and/or time spent for the visit.    Chief Complaint: Axial low back pain    HISTORY OF PRESENT ILLNESS:   The patient is a 38 year old female who presents by video visit after L5/S1 facet injections. She states that her pain is not as bad after the procedure. She states that if she moves forward flexing and to the side she feels the pain in the low back. She states that her pain intensity is improved but is still present.     She does a home exercise in bed. She does some low back stabilization exercises against a wall. She has been pretty consistent with these.     She continues to take Meloxicam with improvement.     She does have to take rest breaks during  activity and with stretching and rest she improves, but still feels pain with any sort of increased activity.     CURRENT MEDICATIONS:     Current Outpatient Medications   Medication Sig Dispense Refill    montelukast 4 MG Oral Chew Tab Chew 1 tablet (4 mg total) by mouth daily. 20 tablet 0    Meloxicam 15 MG Oral Tab Take 1 tablet (15 mg total) by mouth daily. 30 tablet 0    Lancets Does not apply Misc Dx. E11.65. Checks qam. 100 each 3    atorvastatin 10 MG Oral Tab Take 1 tablet (10 mg total) by mouth nightly. 90 tablet 3    Semaglutide (RYBELSUS) 7 MG Oral Tab Take 7 mg by mouth daily. 30 tablet 3    Glucose Blood (ONETOUCH VERIO) In Vitro Strip       Blood Glucose Monitoring Suppl w/Device Does not apply Kit Dx. E11.65. Checks qam. 1 kit 0    Blood Gluc Meter Disp-Strips Does not apply Device Dx. E11.65. Checks qam. 50 each 11    Blood Glucose Monitoring Suppl (BLOOD GLUCOSE SYSTEM MEHUL) Does not apply Kit DX E 11.22. 1 kit 0    acetaminophen 500 MG Oral Tab Take 1 tablet (500 mg total) by mouth every 6 (six) hours as needed for Pain.           ALLERGIES:   No Known Allergies      REVIEW OF SYSTEMS:   A comprehensive 10 point review of systems was completed.  Pertinent positives and negatives noted in the the HPI.      PHYSICAL EXAM:   General: No immediate distress  Head: Normocephalic/ Atraumatic  Eyes: Extra-occular movements intact  Ears/Nose/Throat:  External appearance identifies normal appearance without obvious deformity  Cardiovascular: No cyanosis, clubbing or edema  Respiratory: Non-labored respirations  Skin: No lesions noted   Neurological: alert & oriented x 3, attentive, able to follow commands, comprehention intact, spontaneous speech intact  Psychiatric: Mood and affect appropriate    Musculoskeletal Exam:  Physical exam limited by video visit however patient is sitting comfortably during the call.  She endorses good subjective strength.  Patient is able to forward flex and rotate her low back  with some exacerbation of her pain.      IMAGING:   Independently reviewed the MRI of the lumbar spine dated 12/5/2023 which reveals some facet arthropathy at L4-5 and L5-S1 there is very minimal left neuroforaminal stenosis at L5-S1 significant neuroforaminal compromise.  There is facet increase signal on T2 weighted imaging and bilateral L5 and S1 facet joints consistent with synovitis.     All imaging results were reviewed and discussed with patient.      ASSESSMENT:     1. Lumbar spondylosis    2. Myofascial low back pain          PLAN:   Meloxciam and muscle relaxer to address of the myofascial overlay. Ongoing HEP.   See me back in about 3-4 weeks at which time we will consider medial branch block with eventual RFA for axial low back pain continues to appear facetogenic at follow-up.    The patient verbalized understanding with this plan and was in agreement.  There are no barriers to learning.  All questions were answered.    Duration of the service: 15 minutes          Christophe Mixon DO  Interventional Spine and Sports Medicine Specialist   Physical Medicine and Rehabilitation  [address] [address]    [address] [place] [address]

## 2024-03-19 ENCOUNTER — OFFICE VISIT (OUTPATIENT)
Dept: PODIATRY CLINIC | Facility: CLINIC | Age: 39
End: 2024-03-19
Payer: COMMERCIAL

## 2024-03-19 DIAGNOSIS — M72.2 PLANTAR FASCIITIS: Primary | ICD-10-CM

## 2024-03-19 PROCEDURE — 99213 OFFICE O/P EST LOW 20 MIN: CPT | Performed by: STUDENT IN AN ORGANIZED HEALTH CARE EDUCATION/TRAINING PROGRAM

## 2024-03-19 NOTE — PROGRESS NOTES
[facility]  Progress Note      Kitty Hamm is a 38 year old female.   Chief Complaint   Patient presents with    Heel Pain     L heel f/u - Pt received injection to L heel on 01/16 with results. No pain,numbness or tingling.             HPI:     Patient is a pleasant 38-year-old female presents to clinic for follow-up of left plantar fasciitis.  She received a cortisone injection on January 16 which has provided her with relief until now.  She has been performing stretching and icing as instructed.  Admits to using supportive shoes with walking.      Allergies: Patient has no known allergies.    Current Outpatient Medications   Medication Sig Dispense Refill    cyclobenzaprine 10 MG Oral Tab Take 1 tablet (10 mg total) by mouth nightly. 30 tablet 0    montelukast 4 MG Oral Chew Tab Chew 1 tablet (4 mg total) by mouth daily. 20 tablet 0    Meloxicam 15 MG Oral Tab Take 1 tablet (15 mg total) by mouth daily. 30 tablet 0    Lancets Does not apply Misc Dx. E11.65. Checks qam. 100 each 3    atorvastatin 10 MG Oral Tab Take 1 tablet (10 mg total) by mouth nightly. 90 tablet 3    Semaglutide (RYBELSUS) 7 MG Oral Tab Take 7 mg by mouth daily. 30 tablet 3    Glucose Blood (ONETOUCH VERIO) In Vitro Strip       Blood Glucose Monitoring Suppl w/Device Does not apply Kit Dx. E11.65. Checks qam. 1 kit 0    Blood Gluc Meter Disp-Strips Does not apply Device Dx. E11.65. Checks qam. 50 each 11    Blood Glucose Monitoring Suppl (BLOOD GLUCOSE SYSTEM MEHUL) Does not apply Kit DX E 11.22. 1 kit 0    acetaminophen 500 MG Oral Tab Take 1 tablet (500 mg total) by mouth every 6 (six) hours as needed for Pain.        Past Medical History:   Diagnosis Date    Bartholin's cyst 2013, 2012    Surgical. 2012-Bartholin's abscess, drug therapy    Diabetes (HCC)     High cholesterol     History of Papanicolaou smear of cervix 11/12/2013    Hypercholesterolemia without hypertriglyceridemia     IBS (irritable bowel syndrome)     Lipid  screening 04/11/2013    Microhematuria     Dr. Rajan.     Morbid obesity with BMI of 40.0-44.9, adult ([facility])     Osteoporosis screening 04/16/2012    PVC (premature ventricular contraction)     Symptomatic. [facility] Baum [facility].       Past Surgical History:   Procedure Laterality Date    EGD  8/11    OTHER SURGICAL HISTORY      Bartholin's cyst surgery    OTHER SURGICAL HISTORY  8/1/2011    Excision of left sebacious cyst      Family History   Problem Relation Age of Onset    Diabetes Father     Hypertension Father     Diabetes Paternal Grandmother     Hypertension Paternal Grandmother     Renal Disease Paternal Grandfather     Diabetes Paternal Grandfather     Hypertension Mother     Cancer Maternal Grandfather         stomach    Breast Cancer Maternal Aunt     Colon Cancer Paternal Aunt     Dementia Maternal Grandmother     Hypertension Brother       Social History     Socioeconomic History    Marital status:    Tobacco Use    Smoking status: Never    Smokeless tobacco: Never   Vaping Use    Vaping Use: Never used   Substance and Sexual Activity    Alcohol use: Yes     Comment: Special occassions    Drug use: No    Sexual activity: Yes     Birth control/protection: Injection   Other Topics Concern    Caffeine Concern Yes     Comment: Soda           REVIEW OF SYSTEMS:     Denies nause, fever, chills  No calf pain  Denies chest pain or SOB      EXAM:   LMP 09/30/2023 (Approximate)   GENERAL: well developed, well nourished, in no apparent distress  EXTREMITIES:   1. Integument: Normal skin temperature and turgor  2. Vascular: Dorsalis pedis two out of four bilateral and posterior tibial pulses two out of   four bilateral, capillary refill normal.   3. Musculoskeletal: No pain with palpation to left medial calcaneal tubercle   4. Neurological: Normal sharp dull sensation; reflexes normal.             ASSESSMENT AND PLAN:   Diagnoses and all orders for this visit:    Plantar fasciitis        Plan:     Patient  seen and examined and findings discussed with patient.  No need for any further cortisone injections at this time.  Continue daily calf stretching and icing.  Use supportive shoes at all times when walking.  Return to clinic if symptoms worsen or fail to improve.    The patient indicates understanding of these issues and agrees to the plan.        Dee Fountain DPM

## 2024-04-01 ENCOUNTER — OFFICE VISIT (OUTPATIENT)
Dept: PHYSICAL MEDICINE AND REHAB | Facility: CLINIC | Age: 39
End: 2024-04-01
Payer: COMMERCIAL

## 2024-04-01 ENCOUNTER — TELEPHONE (OUTPATIENT)
Dept: PHYSICAL MEDICINE AND REHAB | Facility: CLINIC | Age: 39
End: 2024-04-01

## 2024-04-01 VITALS — HEIGHT: 69 IN | BODY MASS INDEX: 41.47 KG/M2 | WEIGHT: 280 LBS

## 2024-04-01 DIAGNOSIS — M47.816 LUMBAR SPONDYLOSIS: Primary | ICD-10-CM

## 2024-04-01 PROCEDURE — 99214 OFFICE O/P EST MOD 30 MIN: CPT | Performed by: PHYSICAL MEDICINE & REHABILITATION

## 2024-04-01 PROCEDURE — 3008F BODY MASS INDEX DOCD: CPT | Performed by: PHYSICAL MEDICINE & REHABILITATION

## 2024-04-01 RX ORDER — MELOXICAM 15 MG/1
15 TABLET ORAL DAILY
Qty: 30 TABLET | Refills: 0 | Status: SHIPPED | OUTPATIENT
Start: 2024-04-01

## 2024-04-01 NOTE — TELEPHONE ENCOUNTER
Initiated Bilateral L4/5 and L5/S1 Medial branch blocks CPT Code: 00773-55, 64494x2 & Dx: M47.816 to be done at [facility] with site [facility].   Status: No authorization required.   Ref # [id number]      Patient has been scheduled for Bilateral L4/5 and L5/S1 Medial branch blocks on 4/9/24 at the [facility] with Dr. Mixon.   -Anesthesia type:  Local  -Patient was advised that if he/she does receive the covid vaccine it needs to be at least 2 weeks before or after the injection.  -Medications and allergies reviewed.  -Patient reminded to hold NSAIDs (Ibuprofen, ASA 81, Aleve, Naproxen, Mobic, Diclofenac, Etodolac, Celebrex etc.) for 3 days prior to Lumbar MBB/Facet if BMI is greater than 35. For Cervical injections only hold multivitamins, Vitamin E, Fish Oil, Phentermine/Lomaira for 7 days prior to injection and NSAIDS.   mg to be held for 7 days prior to injections.  -If patient is receiving MAC/IVCS, weight loss oral/injectable medications will need to be held for 7 days prior to injection.  -Patient informed to fast 12 hours prior to procedure with IVCS/MAC.   -If on blood thinner, clearance has been received and approved to hold this medication by provider.   -Patient informed of [facility]'s  policy:  he/she will need a  to and from procedure and must be on site for their entirety of their visit, if their ride is unable to the procedure will be cancelled.   -[facility] is located in the [facility] 1st floor,  may park in the yellow/purple parking lot.  Patient verbalized understanding and agrees with plan.  Scheduled in Epic: Yes  Scheduled in Surgical Case: Yes  Follow up appointment made: NOV: Visit date not found Condition update needed.  JAMAAL ELM diary sent to patient.

## 2024-04-01 NOTE — PROGRESS NOTES
RETURN PATIENT VISIT    CHIEF COMPLAINT  Axial low back pain    INTERVAL HISTORY  Kitty Hamm is a 38 year old who was last seen in clinic on 3/4/2024, at that visit patient was following up after lumbar facet injections, patient returns today stating that she has no weakness but ongoing bilateral low back aching pain, left greater than right.  Currently using meloxicam and Tylenol with Flexeril as needed at night.  She had positive diagnostic response to facet injections with significant relief however this was short-lived and her pain returned.    Currently rates her pain 4/10.    REVIEW OF SYSTEMS  Review of systems was completed with the patient today as pertinent to today's visit    PHYSICAL EXAMINATION  CONSTITUTIONAL: Well-appearing, in no apparent distress  EYES: No scleral icterus or conjunctival hemorrhage  CARDIOVASCULAR: Skin warm and well-perfused, no peripheral edema  RESPIRATORY: Breathing unlabored without accessory muscle use  PSYCHIATRIC: Alert, cooperative, appropriate mood and affect  SKIN: No lesions or rashes on exposed skin  MUSCULOSKELETAL: Ongoing extension bias axial low back pain, facet loading positive bilaterally.  NEUROLOGIC: Neural tension signs are negative, provocative maneuvers are negative.  Strength and sensation are well-maintained in the lower extremities.    REVIEW OF PRIOR X-RAYS/STUDIES  Independently reviewed the MRI of the lumbar spine dated 12/5/2023 which reveals some facet arthropathy at L4-5 and L5-S1 there is very minimal left neuroforaminal stenosis at L5-S1 significant neuroforaminal compromise.  There is facet increase signal on T2 weighted imaging and bilateral L5 and S1 facet joints consistent with synovitis.     IMPRESSION/DIAGNOSIS  1.    Encounter Diagnosis   Name Primary?    Lumbar spondylosis Yes         TREATMENT/PLAN  We discussed weight loss, she previously saw Dr. Winters - plans to return to see him for weight management.  Additionally we will move  forward with bilateral L4-5 and L5-S1 medial branch blocks with fluoroscopic guidance and local anesthesia.    Refill meloxicam    Education was provided regarding the above impression/diagnosis and treatment options/plan were discussed.  All questions were answered during today's visit.  Patient will contact clinic if any other questions or concerns.          Christophe Mixon DO  Interventional Spine and Sports Medicine Specialist   Physical Medicine and Rehabilitation  [facility]  [address] [place] [id number]    [facility]  [address]. [address] [place] [address]

## 2024-04-03 DIAGNOSIS — E11.65 UNCONTROLLED TYPE 2 DIABETES MELLITUS WITH HYPERGLYCEMIA (HCC): ICD-10-CM

## 2024-04-04 RX ORDER — ORAL SEMAGLUTIDE 7 MG/1
7 TABLET ORAL DAILY
Qty: 90 TABLET | Refills: 3 | OUTPATIENT
Start: 2024-04-04 | End: 2024-05-04

## 2024-04-04 RX ORDER — ORAL SEMAGLUTIDE 7 MG/1
7 TABLET ORAL DAILY
Qty: 90 TABLET | Refills: 0 | Status: SHIPPED | OUTPATIENT
Start: 2024-04-04 | End: 2024-05-04

## 2024-04-04 NOTE — TELEPHONE ENCOUNTER
Please review.  Protocol failed / No protocol.    Requested Prescriptions   Pending Prescriptions Disp Refills    Semaglutide (RYBELSUS) 7 MG Oral Tab 90 tablet 3     Sig: Take 7 mg by mouth daily.       Diabetes Medication Protocol Failed - 4/4/2024 10:40 AM        Failed - Last A1C < 7.5 and within past 6 months     Lab Results   Component Value Date    A1C 6.3 (H) 04/27/2023             Passed - In person appointment or virtual visit in the past 6 mos or appointment in next 3 mos     Recent Outpatient Visits              3 days ago Lumbar spondylosis    [facility] Christophe Mixon DO    Office Visit    2 weeks ago Plantar fasciitis    [facility] Dee Fountain DPM    Office Visit    1 month ago Lumbar spondylosis    [facility] Christophe Mixon DO    Telemedicine    1 month ago Acute URI    [facility] Dieudonne Kelley MD    Office Visit    2 months ago Plantar fasciitis    [facility] Dee Fountain DPM    Office Visit          Future Appointments         Provider Department Appt Notes    In 5 days Christophe Mixon DO [facility] Bilateral L4/5 and L5/S1 Medial branch blocks CPT Code: 13452-86, 64494x2    In 1 week [facility] - OB/GYN DEPO SHOT    In 2 months Deidre Gustafson MD [facility] px    In 4 months Marco Winters MD [facility] BCBS PPO  NON SX F/U               Passed - Microalbumin procedure in past 12 months or taking ACE/ARB        Passed - EGFRCR or GFRNAA > 50     GFR Evaluation  EGFRCR: 112 , resulted on 10/27/2023          Passed - GFR in the past 12 months              Recent Outpatient  Visits              [facility]    2 weeks ago [facility]    Office Visit    1 month ago [facility]ondyl[facility]    [facility]    1 month ago Acute URI    [place] [place] Dieudonne Kelley MD    Office Visit    2 months ago [facility]    [facility]             Future Appointments         Provider Department Appt Notes    In 5 days Christophe Mixon DO [facility] Bilateral L4/5 and L5/S1 Medial branch blocks CPT Code: 35095-27, 64494x2    In 1 week [facility] RN [facility] - OB/GYN DEPO SHOT    In 2 months Deidre Gustafson MD [facility] px    In 4 months Marco Winters MD [facility] BCBS PPO  NON SX F/U

## 2024-04-10 ENCOUNTER — PATIENT MESSAGE (OUTPATIENT)
Dept: PHYSICAL MEDICINE AND REHAB | Facility: CLINIC | Age: 39
End: 2024-04-10

## 2024-04-10 ENCOUNTER — TELEPHONE (OUTPATIENT)
Dept: PHYSICAL MEDICINE AND REHAB | Facility: CLINIC | Age: 39
End: 2024-04-10

## 2024-04-10 DIAGNOSIS — M47.816 LUMBAR SPONDYLOSIS: Primary | ICD-10-CM

## 2024-04-10 NOTE — TELEPHONE ENCOUNTER
From: Kitty Hamm  To: Christophe Mixon  Sent: 4/10/2024 7:24 AM CDT  Subject: Update after procedure    Good morning, I have attached the form for my condition update from yesterday's procedure. Right after the procedure I did not feel any pain and I actually felt weird not to feel that pain lol. I did a lot yesterday and took advantage of the pain free I felt. Started to feel the pain return late at night slowly and when I woke up this morning to go to work more of the pain had came back back not all of it. It is slowly returning but I have a great day yesterday.

## 2024-04-10 NOTE — TELEPHONE ENCOUNTER
Initiated authorization for Bilateral L4-5 and L5-S1 facet joint medial branch radiofrequency ablation, fluoroscopic guidance CPT/HCPCS 84852-03, 64636x2 dx:M47.816 to be done at [facility] with [employer]  Case #[id number]  Status: Approved-authorization is not required per health plan however may be subject to review once claim is submitted      Per Dr. Mixon w/ IVCS

## 2024-04-10 NOTE — TELEPHONE ENCOUNTER
Patient has been scheduled for  Bilateral L4-5 and L5-S1 facet joint medial branch radiofrequency ablation on 4/23/24 at the [facility] with Dr. Mixon.   -Anesthesia type:  IVCS- Reviewed IVCS rx protocol.  -Patient was advised that if he/she does receive the covid vaccine it needs to be at least 2 weeks before or after the injection.  -Medications and allergies reviewed.  -Patient reminded to hold NSAIDs (Ibuprofen, ASA 81, Aleve, Naproxen, Mobic, Diclofenac, Etodolac, Celebrex etc.) for 3 days prior to Lumbar MBB/Facet if BMI is greater than 35. For Cervical injections only hold multivitamins, Vitamin E, Fish Oil, Phentermine/Lomaira for 7 days prior to injection and NSAIDS.   mg to be held for 7 days prior to injections.  -If patient is receiving MAC/IVCS, weight loss oral/injectable medications will need to be held for 7 days prior to injection. - Patient to discontinue Rybelsus oral tab for 7 days.   -Patient informed to fast 12 hours prior to procedure with IVCS/MAC.   -If on blood thinner, clearance has been received and approved to hold this medication by provider.   -Patient informed of [facility]'s  policy:  he/she will need a  to and from procedure and must be on site for their entirety of their visit, if their ride is unable to the procedure will be cancelled.   -[facility] is located in the [facility] 1st floor,  may park in the yellow/purple parking lot.  Patient verbalized understanding and agrees with plan.  Scheduled in Epic: Yes  Scheduled in Surgical Case: Yes  Follow up appointment made: NOV: 5/7/2024 Christophe Mixon DO

## 2024-04-16 ENCOUNTER — NURSE ONLY (OUTPATIENT)
Dept: OBGYN CLINIC | Facility: CLINIC | Age: 39
End: 2024-04-16
Payer: COMMERCIAL

## 2024-04-16 VITALS — SYSTOLIC BLOOD PRESSURE: 122 MMHG | DIASTOLIC BLOOD PRESSURE: 81 MMHG

## 2024-04-16 DIAGNOSIS — Z30.42 ENCOUNTER FOR DEPO-PROVERA CONTRACEPTION: Primary | ICD-10-CM

## 2024-04-16 PROCEDURE — 81025 URINE PREGNANCY TEST: CPT | Performed by: OBSTETRICS & GYNECOLOGY

## 2024-04-16 PROCEDURE — 96372 THER/PROPH/DIAG INJ SC/IM: CPT | Performed by: OBSTETRICS & GYNECOLOGY

## 2024-04-16 PROCEDURE — 3074F SYST BP LT 130 MM HG: CPT | Performed by: OBSTETRICS & GYNECOLOGY

## 2024-04-16 PROCEDURE — 3079F DIAST BP 80-89 MM HG: CPT | Performed by: OBSTETRICS & GYNECOLOGY

## 2024-04-16 RX ADMIN — MEDROXYPROGESTERONE ACETATE 150 MG: 150 INJECTION, SUSPENSION INTRAMUSCULAR at 15:04:00

## 2024-05-07 ENCOUNTER — TELEMEDICINE (OUTPATIENT)
Dept: PHYSICAL MEDICINE AND REHAB | Facility: CLINIC | Age: 39
End: 2024-05-07
Payer: COMMERCIAL

## 2024-05-07 DIAGNOSIS — M54.50 MYOFASCIAL LOW BACK PAIN: ICD-10-CM

## 2024-05-07 DIAGNOSIS — M47.816 LUMBAR SPONDYLOSIS: Primary | ICD-10-CM

## 2024-05-07 PROCEDURE — 99214 OFFICE O/P EST MOD 30 MIN: CPT | Performed by: PHYSICAL MEDICINE & REHABILITATION

## 2024-05-07 NOTE — PROGRESS NOTES
[facility]    Telemedicine Visit - Follow Up Evaluation    Telehealth Verbal Consent   I conducted a telehealth visit with Kitty Hamm today, 05/07/24, which was completed using two-way, real-time interactive audio and video communication.The patient was made aware of the limitations of the telehealth visit, including treatment limitations as no physical exam could be performed.  The patient was advised to call 911 or to go to the ER in case there was an emergency.  The patient was also advised of the potential privacy & security concerns related to the telehealth platform.   The patient was made aware of where to find [facility]'s notice of privacy practices, telehealth consent form and other related consent forms and documents.  which are located on the [facility] website. The patient verbally agreed to telehealth consent form, related consents and the risks discussed.    Lastly, the patient confirmed that they were in [place].   Included in this visit, time may have been spent reviewing labs, medications, radiology tests and decision making. Appropriate medical decision-making and tests are ordered as detailed in the plan of care above.  Coding/billing information is submitted for this visit based on complexity of care and/or time spent for the visit.    Chief Complaint: Axial low back pain     HISTORY OF PRESENT ILLNESS:   The patient is a 38 year old female with axial low back pain presenting by video visit follow-up after radiofrequency ablation.  This procedure was performed on 4/23/2024.  Patient states that the week following the procedure the patient had some increased soreness which improved with Tylenol and ice as well as relative rest.  She states that over the following 2 weeks she slowly started to notice improvement in her axial low back complaints.  She continues to have intermittent sharp pains in the low back however these are becoming less frequent.  She  states that she has more good days and subsequently improved mood and improved energy levels.     She states that her pain is easing off over the last week or so. She continues to have axial low back pain when she is being very active.       CURRENT MEDICATIONS:     Current Outpatient Medications   Medication Sig Dispense Refill    Semaglutide (RYBELSUS) 7 MG Oral Tab Take 7 mg by mouth daily. 90 tablet 0    Meloxicam 15 MG Oral Tab Take 1 tablet (15 mg total) by mouth daily. (Patient not taking: Reported on 4/16/2024) 30 tablet 0    cyclobenzaprine 10 MG Oral Tab Take 1 tablet (10 mg total) by mouth nightly. 30 tablet 0    montelukast 4 MG Oral Chew Tab Chew 1 tablet (4 mg total) by mouth daily. (Patient not taking: Reported on 4/16/2024) 20 tablet 0    Meloxicam 15 MG Oral Tab Take 1 tablet (15 mg total) by mouth daily. 30 tablet 0    Lancets Does not apply Misc Dx. E11.65. Checks qam. 100 each 3    atorvastatin 10 MG Oral Tab Take 1 tablet (10 mg total) by mouth nightly. 90 tablet 3    Glucose Blood (ONETOUCH VERIO) In Vitro Strip       Blood Glucose Monitoring Suppl w/Device Does not apply Kit Dx. E11.65. Checks qam. 1 kit 0    Blood Gluc Meter Disp-Strips Does not apply Device Dx. E11.65. Checks qam. 50 each 11    Blood Glucose Monitoring Suppl (BLOOD GLUCOSE SYSTEM MEHUL) Does not apply Kit DX E 11.22. 1 kit 0    acetaminophen 500 MG Oral Tab Take 1 tablet (500 mg total) by mouth every 6 (six) hours as needed for Pain.           ALLERGIES:   No Known Allergies      REVIEW OF SYSTEMS:   A comprehensive 10 point review of systems was completed.  Pertinent positives and negatives noted in the the HPI.      PHYSICAL EXAM:   General: No immediate distress  Head: Normocephalic/ Atraumatic  Eyes: Extra-occular movements intact  Ears/Nose/Throat:  External appearance identifies normal appearance without obvious deformity  Cardiovascular: No cyanosis, clubbing or edema  Respiratory: Non-labored respirations  Skin: No  lesions noted   Neurological: alert & oriented x 3, attentive, able to follow commands, comprehention intact, spontaneous speech intact  Psychiatric: Mood and affect appropriate    Musculoskeletal Exam:  Physical exam limited by video visit however patient is sitting comfortably on the exam call.  In no acute distress.  Patient endorses good subjective strength with ongoing extension bias axial low back pain, slightly improved since the procedure.    IMAGING:   Independently reviewed the MRI of the lumbar spine dated 12/5/2023 which reveals facet arthropathy at L4-5 and L5-S1 without significant neural compromise at L4-5 and very minor left foraminal stenosis at L5-S1.    All imaging results were reviewed and discussed with patient.      ASSESSMENT:     1. Lumbar spondylosis    2. Myofascial low back pain          PLAN:   At this point, physical fitness, weight loss, core strengthening and home exercises will be paramount.  She has slowly been improving after radiofrequency ablation procedure.  She will follow-up with me in about 1 month to check in on her progress.  She may continue to take meloxicam and cyclobenzaprine on an as-needed basis at this point.  Ideally able to wean these off over the next few weeks.    The patient verbalized understanding with this plan and was in agreement.  There are no barriers to learning.  All questions were answered.    Duration of the service: 14 minutes        Christophe Mixon DO  Physical Medicine and Rehabilitation  Interventional Spine and Sports Medicine   [facility]

## 2024-06-11 ENCOUNTER — LAB ENCOUNTER (OUTPATIENT)
Dept: LAB | Facility: HOSPITAL | Age: 39
End: 2024-06-11
Attending: INTERNAL MEDICINE
Payer: COMMERCIAL

## 2024-06-11 ENCOUNTER — OFFICE VISIT (OUTPATIENT)
Dept: INTERNAL MEDICINE CLINIC | Facility: CLINIC | Age: 39
End: 2024-06-11

## 2024-06-11 ENCOUNTER — TELEMEDICINE (OUTPATIENT)
Dept: PHYSICAL MEDICINE AND REHAB | Facility: CLINIC | Age: 39
End: 2024-06-11
Payer: COMMERCIAL

## 2024-06-11 VITALS
TEMPERATURE: 98 F | SYSTOLIC BLOOD PRESSURE: 105 MMHG | BODY MASS INDEX: 42.45 KG/M2 | HEART RATE: 74 BPM | HEIGHT: 69 IN | DIASTOLIC BLOOD PRESSURE: 69 MMHG | OXYGEN SATURATION: 100 % | WEIGHT: 286.63 LBS

## 2024-06-11 DIAGNOSIS — E11.65 UNCONTROLLED TYPE 2 DIABETES MELLITUS WITH HYPERGLYCEMIA (HCC): ICD-10-CM

## 2024-06-11 DIAGNOSIS — I49.3 PVC (PREMATURE VENTRICULAR CONTRACTION): ICD-10-CM

## 2024-06-11 DIAGNOSIS — K58.0 IRRITABLE BOWEL SYNDROME WITH DIARRHEA: ICD-10-CM

## 2024-06-11 DIAGNOSIS — R51.9 HEADACHE AROUND THE EYES: ICD-10-CM

## 2024-06-11 DIAGNOSIS — Z00.00 ROUTINE GENERAL MEDICAL EXAMINATION AT A HEALTH CARE FACILITY: ICD-10-CM

## 2024-06-11 DIAGNOSIS — Z71.85 VACCINE COUNSELING: ICD-10-CM

## 2024-06-11 DIAGNOSIS — E55.9 VITAMIN D DEFICIENCY: ICD-10-CM

## 2024-06-11 DIAGNOSIS — E66.01 MORBID OBESITY WITH BMI OF 40.0-44.9, ADULT (HCC): ICD-10-CM

## 2024-06-11 DIAGNOSIS — R31.29 MICROHEMATURIA: ICD-10-CM

## 2024-06-11 DIAGNOSIS — Z80.9 FAMILY HISTORY OF CANCER: ICD-10-CM

## 2024-06-11 DIAGNOSIS — M54.50 CHRONIC BILATERAL LOW BACK PAIN WITHOUT SCIATICA: Primary | ICD-10-CM

## 2024-06-11 DIAGNOSIS — E78.00 HYPERCHOLESTEROLEMIA WITHOUT HYPERTRIGLYCERIDEMIA: Primary | ICD-10-CM

## 2024-06-11 DIAGNOSIS — F43.9 STRESS: ICD-10-CM

## 2024-06-11 DIAGNOSIS — G89.29 CHRONIC BILATERAL LOW BACK PAIN WITHOUT SCIATICA: Primary | ICD-10-CM

## 2024-06-11 LAB
ALBUMIN SERPL-MCNC: 4.5 G/DL (ref 3.2–4.8)
ALBUMIN/GLOB SERPL: 1.5 {RATIO} (ref 1–2)
ALP LIVER SERPL-CCNC: 64 U/L
ALT SERPL-CCNC: 19 U/L
ANION GAP SERPL CALC-SCNC: 6 MMOL/L (ref 0–18)
APPEARANCE: CLEAR
AST SERPL-CCNC: 16 U/L (ref ?–34)
BILIRUB SERPL-MCNC: 0.4 MG/DL (ref 0.3–1.2)
BILIRUBIN: NEGATIVE
BUN BLD-MCNC: 7 MG/DL (ref 9–23)
BUN/CREAT SERPL: 10.1 (ref 10–20)
CALCIUM BLD-MCNC: 9.2 MG/DL (ref 8.7–10.4)
CHLORIDE SERPL-SCNC: 109 MMOL/L (ref 98–112)
CHOLEST SERPL-MCNC: 139 MG/DL (ref ?–200)
CO2 SERPL-SCNC: 25 MMOL/L (ref 21–32)
CREAT BLD-MCNC: 0.69 MG/DL
CREAT UR-SCNC: 155 MG/DL
EGFRCR SERPLBLD CKD-EPI 2021: 113 ML/MIN/1.73M2 (ref 60–?)
EST. AVERAGE GLUCOSE BLD GHB EST-MCNC: 134 MG/DL (ref 68–126)
FASTING PATIENT LIPID ANSWER: YES
FASTING STATUS PATIENT QL REPORTED: YES
GLOBULIN PLAS-MCNC: 3.1 G/DL (ref 2–3.5)
GLUCOSE (URINE DIPSTICK): NEGATIVE MG/DL
GLUCOSE BLD-MCNC: 99 MG/DL (ref 70–99)
HBA1C MFR BLD: 6.3 % (ref ?–5.7)
HDLC SERPL-MCNC: 45 MG/DL (ref 40–59)
KETONES (URINE DIPSTICK): NEGATIVE MG/DL
LDLC SERPL CALC-MCNC: 80 MG/DL (ref ?–100)
LEUKOCYTES: NEGATIVE
MICROALBUMIN UR-MCNC: 0.3 MG/DL
MICROALBUMIN/CREAT 24H UR-RTO: 1.9 UG/MG (ref ?–30)
MULTISTIX LOT#: NORMAL NUMERIC
NITRITE, URINE: NEGATIVE
NONHDLC SERPL-MCNC: 94 MG/DL (ref ?–130)
OCCULT BLOOD: NEGATIVE
OSMOLALITY SERPL CALC.SUM OF ELEC: 288 MOSM/KG (ref 275–295)
PH, URINE: 7 (ref 4.5–8)
POTASSIUM SERPL-SCNC: 3.8 MMOL/L (ref 3.5–5.1)
PROT SERPL-MCNC: 7.6 G/DL (ref 5.7–8.2)
PROTEIN (URINE DIPSTICK): NEGATIVE MG/DL
SODIUM SERPL-SCNC: 140 MMOL/L (ref 136–145)
SPECIFIC GRAVITY: 1.02 (ref 1–1.03)
TRIGL SERPL-MCNC: 68 MG/DL (ref 30–149)
URINE-COLOR: YELLOW
UROBILINOGEN,SEMI-QN: 1 MG/DL (ref 0–1.9)
VLDLC SERPL CALC-MCNC: 11 MG/DL (ref 0–30)

## 2024-06-11 PROCEDURE — 81003 URINALYSIS AUTO W/O SCOPE: CPT | Performed by: INTERNAL MEDICINE

## 2024-06-11 PROCEDURE — 36415 COLL VENOUS BLD VENIPUNCTURE: CPT

## 2024-06-11 PROCEDURE — 99395 PREV VISIT EST AGE 18-39: CPT | Performed by: INTERNAL MEDICINE

## 2024-06-11 PROCEDURE — 80061 LIPID PANEL: CPT

## 2024-06-11 PROCEDURE — 3008F BODY MASS INDEX DOCD: CPT | Performed by: INTERNAL MEDICINE

## 2024-06-11 PROCEDURE — 3074F SYST BP LT 130 MM HG: CPT | Performed by: INTERNAL MEDICINE

## 2024-06-11 PROCEDURE — 3078F DIAST BP <80 MM HG: CPT | Performed by: INTERNAL MEDICINE

## 2024-06-11 PROCEDURE — 83036 HEMOGLOBIN GLYCOSYLATED A1C: CPT

## 2024-06-11 PROCEDURE — 3061F NEG MICROALBUMINURIA REV: CPT | Performed by: INTERNAL MEDICINE

## 2024-06-11 PROCEDURE — 80053 COMPREHEN METABOLIC PANEL: CPT

## 2024-06-11 PROCEDURE — 3044F HG A1C LEVEL LT 7.0%: CPT | Performed by: INTERNAL MEDICINE

## 2024-06-11 PROCEDURE — 99214 OFFICE O/P EST MOD 30 MIN: CPT | Performed by: PHYSICAL MEDICINE & REHABILITATION

## 2024-06-11 RX ORDER — ATORVASTATIN CALCIUM 20 MG/1
20 TABLET, FILM COATED ORAL NIGHTLY
Qty: 90 TABLET | Refills: 3 | Status: SHIPPED | OUTPATIENT
Start: 2024-06-11

## 2024-06-11 NOTE — PATIENT INSTRUCTIONS
ASSESSMENT/PLAN:     Encounter Diagnoses   Name Primary?    Hypercholesterolemia without hypertriglyceridemia Increase atorvastatin to  20 mg at  night. Check blood in 3 months.    Yes    Microhematuria Check urine.        Irritable bowel syndrome with diarrhea Stable.        Morbid obesity with BMI of 40.0-44.9, adult (HCC)       PVC (premature ventricular contraction) Stable.        Routine general medical examination at a health care facility Check urine.        Stress Garcia Rules for Coping with Panic      1) Remember that although your feelings and symptoms are frightening, they are neither dangerous nor harmful.  2) Understand that what you are experiencing is merely an exaggeration of your normal reactions to stress.  3) Do not fight your feelings or try to wish them away. The more willing you are to face them, the less intense they will become.  4) Don't add to your panic by thinking about what might happen. If you finding yourself asking, 'What if?' tell yourself, 'So what!'  5) Stay in the present. Be aware of what is happening to you rather than concern yourself with  how much worse it might get.  6) Label your fear level from zero to 10 and watch it go up and down. Notice that it doesn't stay at a very high level for more than a few seconds.  7) When you find yourself thinking about fear, change your what if thinking. Focus on and perform some simple, manageable task.  8) Notice that when you stop thinking frightening thoughts, your anxiety fades.  9) When fear comes, accept it, don't fight it.  Wait and give it some time to pass. Don't try to escape from it.  10)  Be proud of the progress you've made. Think about how good you will feel when the anxiety has passed and you are in total control and at peace. Hold counselor and meds.        Uncontrolled type 2 diabetes mellitus with hyperglycemia (HCC) Stable. Careful with diet and excercise at least 30 minutes 3-4 times a week. Check sugars at different  times on different dates. Careful with low sugars. Carry something with you and check sugar if can. Can carry an cracker, etc. Decrease carbohydrates. But also, careful with fruits and natural sugars.One serving a day and no more than 1 handful every day. Check feet  every AM and careful with sores and ulcers on feet bilaterally. Check eyes every year with dilated eye exam.  Check sugars.  2-hour postmeal should be less than 140s.  Pre-meal should be 's.  Both equally affected A1c.  Discussed importance of glycemic control to prevent complications of diabetes  -Discussed complications of diabetes include retinopathy, neuropathy, nephropathy and cardiovascular disease  -Discussed ABCs of DM  -Discussed importance of SBGM  -Discussed importance of low CHO diet, recommend 45gm per meal or 135gm per day  -Normal foot exam  -Follow up with optho, has set mercedes't.  -Normotensive        Vaccine counseling Up to date.        Vitamin D deficiency Check blood in 3 months.         Orders Placed This Encounter   Procedures    Microalb/Creat Ratio, Random Urine    Vitamin D    URINALYSIS, AUTO, W/O SCOPE       Meds This Visit:  Requested Prescriptions      No prescriptions requested or ordered in this encounter       Imaging & Referrals:  None      RTC 3 months for FU DM.

## 2024-06-11 NOTE — PROGRESS NOTES
HPI:    Patient ID: Kitty Hamm is a 38 year old female.    Kitty Hamm is a 38 year old female who presents for a complete physical exam.   HPI:     Chief Complaint   Patient presents with    Physical     Patient states she is here for Annual Physical Examination.         Patient's last menstrual period was 04/03/2024 (approximate).   Symptoms: denies discharge, itching, burning or dysuria, periods are irregular, flow is  1 days. Was on depo. X 21 yoQmonth X 2 weeks heavy prior to depo.     /69 (BP Location: Right arm, Patient Position: Sitting, Cuff Size: large)   Pulse 74   Temp 98.3 °F (36.8 °C) (Oral)   Ht 5' 9\" (1.753 m)   Wt 286 lb 9.6 oz (130 kg)   LMP 04/03/2024 (Approximate)   SpO2 100%   BMI 42.32 kg/m²    Wt Readings from Last 4 Encounters:   06/11/24 286 lb 9.6 oz (130 kg)   04/12/24 280 lb (127 kg)   04/03/24 280 lb (127 kg)   04/01/24 280 lb (127 kg)     Body mass index is 42.32 kg/m².     Labs:   Lab Results   Component Value Date/Time    WBC 9.7 10/14/2021 11:32 AM    HGB 12.4 10/14/2021 11:32 AM    .0 10/14/2021 11:32 AM      Lab Results   Component Value Date/Time    GLU 99 06/11/2024 10:35 AM     06/11/2024 10:35 AM    K 3.8 06/11/2024 10:35 AM     06/11/2024 10:35 AM    CO2 25.0 06/11/2024 10:35 AM    CREATSERUM 0.69 06/11/2024 10:35 AM    CA 9.2 06/11/2024 10:35 AM    ALB 4.5 06/11/2024 10:35 AM    TP 7.6 06/11/2024 10:35 AM    ALKPHO 64 06/11/2024 10:35 AM    AST 16 06/11/2024 10:35 AM    ALT 19 06/11/2024 10:35 AM    BILT 0.4 06/11/2024 10:35 AM    TSH 1.130 10/14/2021 11:32 AM        Lab Results   Component Value Date/Time    CHOLEST 139 06/11/2024 10:35 AM    HDL 45 06/11/2024 10:35 AM    TRIG 68 06/11/2024 10:35 AM    LDL 80 06/11/2024 10:35 AM    NONHDLC 94 06/11/2024 10:35 AM       Lab Results   Component Value Date/Time    A1C 6.3 (H) 06/11/2024 10:35 AM      Lab Results   Component Value Date    VITD 36.5 04/26/2023         No recommendations at this  time    Current Outpatient Medications   Medication Sig Dispense Refill    cyclobenzaprine 10 MG Oral Tab Take 1 tablet (10 mg total) by mouth nightly. 30 tablet 0    Meloxicam 15 MG Oral Tab Take 1 tablet (15 mg total) by mouth daily. 30 tablet 0    Lancets Does not apply Misc Dx. E11.65. Checks qam. 100 each 3    atorvastatin 10 MG Oral Tab Take 1 tablet (10 mg total) by mouth nightly. 90 tablet 3    Glucose Blood (ONETOUCH VERIO) In Vitro Strip       Blood Glucose Monitoring Suppl w/Device Does not apply Kit Dx. E11.65. Checks qam. 1 kit 0    Blood Gluc Meter Disp-Strips Does not apply Device Dx. E11.65. Checks qam. 50 each 11    Blood Glucose Monitoring Suppl (BLOOD GLUCOSE SYSTEM MEHUL) Does not apply Kit DX E 11.22. 1 kit 0    acetaminophen 500 MG Oral Tab Take 1 tablet (500 mg total) by mouth every 6 (six) hours as needed for Pain.      Semaglutide (RYBELSUS) 7 MG Oral Tab Take 7 mg by mouth daily. 90 tablet 0    Meloxicam 15 MG Oral Tab Take 1 tablet (15 mg total) by mouth daily. (Patient not taking: Reported on 4/16/2024) 30 tablet 0      Past Medical History:    Bartholin's cyst    Surgical. 2012-Bartholin's abscess, drug therapy    Diabetes (HCC)    High cholesterol    History of Papanicolaou smear of cervix    Hypercholesterolemia without hypertriglyceridemia    IBS (irritable bowel syndrome)    Lipid screening    Microhematuria    Dr. Rajan.     Morbid obesity with BMI of 40.0-44.9, adult (HCC)    Osteoporosis screening    PVC (premature ventricular contraction)    Symptomatic. Sees Dr. Baum at [facility].       Past Surgical History:   Procedure Laterality Date    Egd  8/11    Other surgical history      Bartholin's cyst surgery    Other surgical history  8/1/2011    Excision of left sebacious cyst      Family History   Problem Relation Age of Onset    Diabetes Father     Hypertension Father     Diabetes Paternal Grandmother     Hypertension Paternal Grandmother     Renal Disease Paternal Grandfather      Diabetes Paternal Grandfather     Hypertension Mother     Cancer Maternal Grandfather         stomach    Breast Cancer Maternal Aunt     Colon Cancer Paternal Aunt     Dementia Maternal Grandmother     Hypertension Brother       Social History:  Social History     Socioeconomic History    Marital status:    Tobacco Use    Smoking status: Never    Smokeless tobacco: Never   Vaping Use    Vaping status: Never Used   Substance and Sexual Activity    Alcohol use: Yes     Comment: Special occassions    Drug use: No    Sexual activity: Yes     Birth control/protection: Injection   Other Topics Concern    Caffeine Concern Yes     Comment: Soda         OB History    Para Term  AB Living   0 0 0 0 0 0   SAB IAB Ectopic Multiple Live Births   0 0 0 0 0        Hx of Pap: all Paps normal           Patient here for follow up of Diabetes.  Has been taking medications regularly.    Checks sugars 3 times week.  Fasting sugars average . 2 times at 120's. 2 hr. PP: 130's. No lows.   Watches diabetic diet, low salt.  Diabetic Flow sheet      Latest Ref Rng & Units 2024     2:12 PM 2024     1:59 PM 2024    10:35 AM 2024    11:25 AM   DIABETIC FLOWSHEET ([facility])   BMI  42.32 kg/m2      Hgb A1c <5.7 %   6.3     Cholesterol Total <200 mg/dL   139     Triglycerides 30 - 149 mg/dL   68     HDL 40 - 59 mg/dL   45     LDL <100 mg/dL   80     HEMOGLOBIN A1c <5.7 %   6.3     Weight (enc vitals)  286 lb 9.6 oz      BP (enc vitals)  105/69   132/85   PHQ2 Score   0      [facility] [address] Dr. Suha Galvan.     Hypertension  Patient is here for follow up of hypertension. BP at home: occ. Checks always normal.   Has been compliant with medications.  Exercise level: exercises 5 times a  week (walks during breaktime at work X 45 minutes then at bedtime walks dog) and has been following low salt diet.  Weight has been stable.Occ. eats out on weekends.  cooks breakfest. Cooks with salt and  pepper.   Wt Readings from Last 3 Encounters:   06/11/24 286 lb 9.6 oz (130 kg)   04/12/24 280 lb (127 kg)   04/03/24 280 lb (127 kg)     BP Readings from Last 3 Encounters:   06/11/24 105/69   04/23/24 132/85   04/16/24 122/81       Labs:   Lab Results   Component Value Date/Time    GLU 99 06/11/2024 10:35 AM     06/11/2024 10:35 AM    K 3.8 06/11/2024 10:35 AM     06/11/2024 10:35 AM    CO2 25.0 06/11/2024 10:35 AM    CREATSERUM 0.69 06/11/2024 10:35 AM    CA 9.2 06/11/2024 10:35 AM    AST 16 06/11/2024 10:35 AM    ALT 19 06/11/2024 10:35 AM    TSH 1.130 10/14/2021 11:32 AM        Lab Results   Component Value Date/Time    CHOLEST 139 06/11/2024 10:35 AM    HDL 45 06/11/2024 10:35 AM    TRIG 68 06/11/2024 10:35 AM    LDL 80 06/11/2024 10:35 AM    NONHDLC 94 06/11/2024 10:35 AM          Labs:   Lab Results   Component Value Date/Time    GLU 99 06/11/2024 10:35 AM     06/11/2024 10:35 AM    K 3.8 06/11/2024 10:35 AM     06/11/2024 10:35 AM    CO2 25.0 06/11/2024 10:35 AM    CREATSERUM 0.69 06/11/2024 10:35 AM    CA 9.2 06/11/2024 10:35 AM    AST 16 06/11/2024 10:35 AM    ALT 19 06/11/2024 10:35 AM    TSH 1.130 10/14/2021 11:32 AM        Lab Results   Component Value Date/Time    CHOLEST 139 06/11/2024 10:35 AM    HDL 45 06/11/2024 10:35 AM    TRIG 68 06/11/2024 10:35 AM    LDL 80 06/11/2024 10:35 AM    NONHDLC 94 06/11/2024 10:35 AM          Steroids not help for back pain thru PMR.   Nerve block worked. Saw psyiatry telemedicine visit. Not help after a while. Then, was recc. PT. PT mercedes't to be seen. L heel pain SP steroid injections and now better.      is now cancer free. Up coming mercedes't. Nervous about Dx. at that mercedes't.           Review of Systems   Constitutional:  Positive for activity change. Negative for appetite change, chills, diaphoresis, fatigue, fever and unexpected weight change.   HENT:  Negative for congestion, dental problem, drooling, ear discharge, ear pain, facial  swelling, hearing loss, mouth sores, nosebleeds, postnasal drip, rhinorrhea, sinus pressure, sinus pain, sneezing, sore throat, tinnitus, trouble swallowing and voice change.    Eyes:  Negative for photophobia, pain, discharge, redness, itching and visual disturbance.   Respiratory:  Negative for apnea, cough, choking, chest tightness, shortness of breath, wheezing and stridor.    Cardiovascular:  Negative for chest pain, palpitations and leg swelling.   Gastrointestinal:  Negative for abdominal distention, abdominal pain, anal bleeding, blood in stool, constipation, diarrhea, nausea, rectal pain and vomiting.   Endocrine: Negative for cold intolerance, heat intolerance, polydipsia, polyphagia and polyuria.   Genitourinary:  Negative for decreased urine volume, difficulty urinating, dysuria, flank pain, frequency, hematuria, menstrual problem, pelvic pain, urgency, vaginal bleeding, vaginal discharge and vaginal pain.   Skin:  Negative for rash.   Neurological:  Negative for dizziness, tremors, seizures, syncope, facial asymmetry, speech difficulty, weakness, light-headedness, numbness and headaches.   Psychiatric/Behavioral:  Positive for agitation. Negative for behavioral problems, confusion, decreased concentration, dysphoric mood, hallucinations, self-injury, sleep disturbance and suicidal ideas. The patient is not nervous/anxious and is not hyperactive.    All other systems reviewed and are negative.        Current Outpatient Medications   Medication Sig Dispense Refill    cyclobenzaprine 10 MG Oral Tab Take 1 tablet (10 mg total) by mouth nightly. 30 tablet 0    Meloxicam 15 MG Oral Tab Take 1 tablet (15 mg total) by mouth daily. 30 tablet 0    Lancets Does not apply Misc Dx. E11.65. Checks qam. 100 each 3    atorvastatin 10 MG Oral Tab Take 1 tablet (10 mg total) by mouth nightly. 90 tablet 3    Glucose Blood (ONETOUCH VERIO) In Vitro Strip       Blood Glucose Monitoring Suppl w/Device Does not apply Kit Dx.  E11.65. Checks qam. 1 kit 0    Blood Gluc Meter Disp-Strips Does not apply Device Dx. E11.65. Checks qam. 50 each 11    Blood Glucose Monitoring Suppl (BLOOD GLUCOSE SYSTEM MEHUL) Does not apply Kit DX E 11.22. 1 kit 0    acetaminophen 500 MG Oral Tab Take 1 tablet (500 mg total) by mouth every 6 (six) hours as needed for Pain.      Semaglutide (RYBELSUS) 7 MG Oral Tab Take 7 mg by mouth daily. 90 tablet 0    Meloxicam 15 MG Oral Tab Take 1 tablet (15 mg total) by mouth daily. (Patient not taking: Reported on 4/16/2024) 30 tablet 0     Allergies:No Known Allergies    HISTORY:  Past Medical History:    Bartholin's cyst    Surgical. 2012-Bartholin's abscess, drug therapy    Diabetes (HCC)    High cholesterol    History of Papanicolaou smear of cervix    Hypercholesterolemia without hypertriglyceridemia    IBS (irritable bowel syndrome)    Lipid screening    Microhematuria    Dr. Rajan.     Morbid obesity with BMI of 40.0-44.9, adult ([facility])    Osteoporosis screening    PVC (premature ventricular contraction)    Symptomatic. Sees Dr. Baum at [facility].       Past Surgical History:   Procedure Laterality Date    Egd  8/11    Other surgical history      Bartholin's cyst surgery    Other surgical history  8/1/2011    Excision of left sebacious cyst      Family History   Problem Relation Age of Onset    Diabetes Father     Hypertension Father     Diabetes Paternal Grandmother     Hypertension Paternal Grandmother     Renal Disease Paternal Grandfather     Diabetes Paternal Grandfather     Hypertension Mother     Cancer Maternal Grandfather         stomach    Breast Cancer Maternal Aunt     Colon Cancer Paternal Aunt     Dementia Maternal Grandmother     Hypertension Brother       Social History:   Social History     Socioeconomic History    Marital status:    Tobacco Use    Smoking status: Never    Smokeless tobacco: Never   Vaping Use    Vaping status: Never Used   Substance and Sexual Activity    Alcohol use: Yes      Comment: Special occassions    Drug use: No    Sexual activity: Yes     Birth control/protection: Injection   Other Topics Concern    Caffeine Concern Yes     Comment: Soda     Social Determinants of Health      [facility]    Social Connections        PHYSICAL EXAM:   /69 (BP Location: Right arm, Patient Position: Sitting, Cuff Size: large)   Pulse 74   Temp 98.3 °F (36.8 °C) (Oral)   Ht 5' 9\" (1.753 m)   Wt 286 lb 9.6 oz (130 kg)   LMP 04/03/2024 (Approximate)   SpO2 100%   BMI 42.32 kg/m²   BP Readings from Last 3 Encounters:   06/11/24 105/69   04/23/24 132/85   04/16/24 122/81     Wt Readings from Last 3 Encounters:   06/11/24 286 lb 9.6 oz (130 kg)   04/12/24 280 lb (127 kg)   04/03/24 280 lb (127 kg)       Physical Exam  Vitals and nursing note reviewed.   Constitutional:       General: She is not in acute distress.     Appearance: Normal appearance. She is well-developed and well-groomed. She is not ill-appearing, toxic-appearing or diaphoretic.      Interventions: She is not intubated.  HENT:      Head: Normocephalic and atraumatic.      Right Ear: Tympanic membrane, ear canal and external ear normal. No decreased hearing noted. No laceration, drainage, swelling or tenderness. No middle ear effusion. There is no impacted cerumen. No foreign body. No mastoid tenderness. No PE tube. No hemotympanum. Tympanic membrane is not injected, scarred, perforated, erythematous, retracted or bulging. Tympanic membrane has normal mobility.      Left Ear: Tympanic membrane, ear canal and external ear normal. No decreased hearing noted. No laceration, drainage, swelling or tenderness.  No middle ear effusion. There is no impacted cerumen. No foreign body. No mastoid tenderness. No PE tube. No hemotympanum. Tympanic membrane is not injected, scarred, perforated, erythematous, retracted or bulging. Tympanic membrane has normal mobility.      Nose:       Right Sinus: No maxillary sinus tenderness or frontal sinus tenderness.      Left Sinus: No maxillary sinus tenderness or frontal sinus tenderness.      Mouth/Throat:      Lips: Pink. No lesions.      Mouth: Mucous membranes are moist. No injury, lacerations, oral lesions or angioedema.      Dentition: Does not have dentures. No dental tenderness, gingival swelling, dental abscesses or gum lesions.      Tongue: No lesions. Tongue does not deviate from midline.      Palate: No mass and lesions.      Pharynx: Oropharynx is clear. Uvula midline. No pharyngeal swelling, oropharyngeal exudate, posterior oropharyngeal erythema or uvula swelling.      Tonsils: No tonsillar exudate or tonsillar abscesses.   Eyes:      General: Lids are normal. No scleral icterus.        Right eye: No foreign body, discharge or hordeolum.         Left eye: No foreign body, discharge or hordeolum.      Extraocular Movements: Extraocular movements intact.      Right eye: Normal extraocular motion and no nystagmus.      Left eye: Normal extraocular motion and no nystagmus.      Conjunctiva/sclera: Conjunctivae normal.      Right eye: Right conjunctiva is not injected. No chemosis, exudate or hemorrhage.     Left eye: Left conjunctiva is not injected. No chemosis, exudate or hemorrhage.     Pupils: Pupils are equal, round, and reactive to light.   Neck:      Thyroid: No thyroid mass, thyromegaly or thyroid tenderness.      Vascular: Normal carotid pulses. No carotid bruit, hepatojugular reflux or JVD.      Trachea: Trachea and phonation normal. No tracheal tenderness, tracheostomy, abnormal tracheal secretions or tracheal deviation.   Cardiovascular:      Rate and Rhythm: Normal rate and regular rhythm.      Pulses: Normal pulses.           Carotid pulses are 2+ on the right side and 2+ on the left side.       Radial pulses are 2+ on the right side and 2+ on the left side.        Dorsalis pedis pulses are 2+ on the right side and 2+ on the  left side.        Posterior tibial pulses are 2+ on the right side and 2+ on the left side.      Heart sounds: Normal heart sounds, S1 normal and S2 normal.   Pulmonary:      Effort: Pulmonary effort is normal. No tachypnea, bradypnea, accessory muscle usage, prolonged expiration, respiratory distress or retractions. She is not intubated.      Breath sounds: Normal breath sounds and air entry. No stridor, decreased air movement or transmitted upper airway sounds. No decreased breath sounds, wheezing, rhonchi or rales.   Chest:      Chest wall: No tenderness.   Breasts:     Breasts are symmetrical.      Right: No swelling, bleeding, inverted nipple, mass, nipple discharge, skin change or tenderness.      Left: No swelling, bleeding, inverted nipple, mass, nipple discharge, skin change or tenderness.   Abdominal:      General: Bowel sounds are normal. There is no distension.      Palpations: Abdomen is soft. Abdomen is not rigid. There is no fluid wave, hepatomegaly, splenomegaly or mass.      Tenderness: There is no abdominal tenderness. There is no right CVA tenderness, left CVA tenderness, guarding or rebound.   Genitourinary:     Exam position: Supine.   Musculoskeletal:      Cervical back: Neck supple. No tenderness.      Right lower leg: No edema.      Left lower leg: No edema.   Lymphadenopathy:      Head:      Right side of head: No submental, submandibular, preauricular, posterior auricular or occipital adenopathy.      Left side of head: No submental, submandibular, preauricular, posterior auricular or occipital adenopathy.      Cervical: No cervical adenopathy.      Right cervical: No superficial, deep or posterior cervical adenopathy.     Left cervical: No superficial, deep or posterior cervical adenopathy.      Upper Body:      Right upper body: No supraclavicular, axillary or pectoral adenopathy.      Left upper body: No supraclavicular, axillary or pectoral adenopathy.   Skin:     General: Skin is warm  and dry.      Coloration: Skin is not pale.      Findings: No erythema or rash.   Neurological:      Mental Status: She is alert and oriented to person, place, and time.   Psychiatric:         Attention and Perception: She is attentive. She does not perceive auditory or visual hallucinations.         Mood and Affect: Mood is anxious. Mood is not depressed or elated. Affect is not labile, blunt, flat, angry, tearful or inappropriate.         Speech: Speech normal. She is communicative. Speech is not rapid and pressured, delayed, slurred or tangential.         Behavior: Behavior normal. Behavior is not agitated, slowed, aggressive, withdrawn, hyperactive or combative. Behavior is cooperative.         Thought Content: Thought content is not paranoid or delusional. Thought content does not include homicidal or suicidal ideation. Thought content does not include homicidal or suicidal plan.     Bilateral barefoot skin diabetic exam is normal, visualized feet and the appearance is normal.  Bilateral monofilament sensation of both feet is normal.  Pulsation pedal pulse exam of both lower legs/feet is normal as well.          ASSESSMENT/PLAN:     Encounter Diagnoses   Name Primary?    Hypercholesterolemia without hypertriglyceridemia Increase atorvastatin to  20 mg at  night. Check blood in 3 months.    Yes    Microhematuria Check urine.        Irritable bowel syndrome with diarrhea Stable.        Morbid obesity with BMI of 40.0-44.9, adult (HCC)       PVC (premature ventricular contraction) Stable.        Routine general medical examination at a health care facility Check urine.        Stress Garcia Rules for Coping with Panic      1) Remember that although your feelings and symptoms are frightening, they are neither dangerous nor harmful.  2) Understand that what you are experiencing is merely an exaggeration of your normal reactions to stress.  3) Do not fight your feelings or try to wish them away. The more willing you  are to face them, the less intense they will become.  4) Don't add to your panic by thinking about what might happen. If you finding yourself asking, 'What if?' tell yourself, 'So what!'  5) Stay in the present. Be aware of what is happening to you rather than concern yourself with  how much worse it might get.  6) Label your fear level from zero to 10 and watch it go up and down. Notice that it doesn't stay at a very high level for more than a few seconds.  7) When you find yourself thinking about fear, change your what if thinking. Focus on and perform some simple, manageable task.  8) Notice that when you stop thinking frightening thoughts, your anxiety fades.  9) When fear comes, accept it, don't fight it.  Wait and give it some time to pass. Don't try to escape from it.  10)  Be proud of the progress you've made. Think about how good you will feel when the anxiety has passed and you are in total control and at peace. Hold counselor and meds.        Uncontrolled type 2 diabetes mellitus with hyperglycemia (HCC) Stable. Careful with diet and excercise at least 30 minutes 3-4 times a week. Check sugars at different times on different dates. Careful with low sugars. Carry something with you and check sugar if can. Can carry an cracker, etc. Decrease carbohydrates. But also, careful with fruits and natural sugars.One serving a day and no more than 1 handful every day. Check feet  every AM and careful with sores and ulcers on feet bilaterally. Check eyes every year with dilated eye exam.  Check sugars.  2-hour postmeal should be less than 140s.  Pre-meal should be 's.  Both equally affected A1c.  Discussed importance of glycemic control to prevent complications of diabetes  -Discussed complications of diabetes include retinopathy, neuropathy, nephropathy and cardiovascular disease  -Discussed ABCs of DM  -Discussed importance of SBGM  -Discussed importance of low CHO diet, recommend 45gm per meal or 135gm  per day  -Normal foot exam  -Follow up with optho, has set mercedes't.  -Normotensive        Vaccine counseling Up to date.        Vitamin D deficiency Check blood in 3 months.         Orders Placed This Encounter   Procedures    Microalb/Creat Ratio, Random Urine    Vitamin D    URINALYSIS, AUTO, W/O SCOPE       Meds This Visit:  Requested Prescriptions      No prescriptions requested or ordered in this encounter       Imaging & Referrals:  None      RTC 3 months for FU DM.

## 2024-06-11 NOTE — PROGRESS NOTES
[facility]    Telemedicine Visit - Follow Up Evaluation    Telehealth Verbal Consent   I conducted a telehealth visit with Kitty Hamm today, 06/11/24, which was completed using two-way, real-time interactive audio and video communication.The patient was made aware of the limitations of the telehealth visit, including treatment limitations as no physical exam could be performed.  The patient was advised to call 911 or to go to the ER in case there was an emergency.  The patient was also advised of the potential privacy & security concerns related to the telehealth platform.   The patient was made aware of where to find [facility]'s notice of privacy practices, telehealth consent form and other related consent forms and documents.  which are located on the [facility] website. The patient verbally agreed to telehealth consent form, related consents and the risks discussed.    Lastly, the patient confirmed that they were in [place].   Included in this visit, time may have been spent reviewing labs, medications, radiology tests and decision making. Appropriate medical decision-making and tests are ordered as detailed in the plan of care above.  Coding/billing information is submitted for this visit based on complexity of care and/or time spent for the visit.    Chief Complaint: Axial low back pain     HISTORY OF PRESENT ILLNESS:   The patient is a 38 year old female with axial low back pain who presents by video visit after radiofrequency ablation of the bilateral L4-5 and L5-S1 facet joints.  She last followed up with me on 5/7/2024, at that visit she was 2 weeks out from her ablation procedure.  She was doing well at that time with improvement in her axial low back complaints.  She presents today stating that her back is hurting again. She is taking meloxicam and muscle relaxers.     She states that over the last month, she has been doing more, more activity, more exercise. This  bothered her. She states that she walks for about 45 minutes daily. She notes more pain at night with increased activity. This past weekend she was hosting her family at her house, she was grilling for about 2 hours and this significantly bothered her.     She states that she has some pain in the legs with soreness which can be severe at times.     CURRENT MEDICATIONS:     Current Outpatient Medications   Medication Sig Dispense Refill    Semaglutide (RYBELSUS) 7 MG Oral Tab Take 7 mg by mouth daily. 90 tablet 0    Meloxicam 15 MG Oral Tab Take 1 tablet (15 mg total) by mouth daily. (Patient not taking: Reported on 4/16/2024) 30 tablet 0    cyclobenzaprine 10 MG Oral Tab Take 1 tablet (10 mg total) by mouth nightly. 30 tablet 0    montelukast 4 MG Oral Chew Tab Chew 1 tablet (4 mg total) by mouth daily. (Patient not taking: Reported on 4/16/2024) 20 tablet 0    Meloxicam 15 MG Oral Tab Take 1 tablet (15 mg total) by mouth daily. 30 tablet 0    Lancets Does not apply Misc Dx. E11.65. Checks qam. 100 each 3    atorvastatin 10 MG Oral Tab Take 1 tablet (10 mg total) by mouth nightly. 90 tablet 3    Glucose Blood (ONETOUCH VERIO) In Vitro Strip       Blood Glucose Monitoring Suppl w/Device Does not apply Kit Dx. E11.65. Checks qam. 1 kit 0    Blood Gluc Meter Disp-Strips Does not apply Device Dx. E11.65. Checks qam. 50 each 11    Blood Glucose Monitoring Suppl (BLOOD GLUCOSE SYSTEM MEHUL) Does not apply Kit DX E 11.22. 1 kit 0    acetaminophen 500 MG Oral Tab Take 1 tablet (500 mg total) by mouth every 6 (six) hours as needed for Pain.           ALLERGIES:   No Known Allergies      REVIEW OF SYSTEMS:   A comprehensive 10 point review of systems was completed.  Pertinent positives and negatives noted in the the HPI.      PHYSICAL EXAM:   General: No immediate distress  Head: Normocephalic/ Atraumatic  Eyes: Extra-occular movements intact  Ears/Nose/Throat:  External appearance identifies normal appearance without obvious  deformity  Cardiovascular: No cyanosis, clubbing or edema  Respiratory: Non-labored respirations  Skin: No lesions noted   Neurological: alert & oriented x 3, attentive, able to follow commands, comprehention intact, spontaneous speech intact  Psychiatric: Mood and affect appropriate    Musculoskeletal Exam:    Physical exam limited by video visit however patient is sitting comfortably in the exam call.  She endorses stable subjective strength and sensation to lower extremities.      IMAGING:   MRI of the lumbar spine dated 12/5/2023 which reveals some facet arthropathy at L4-5 and L5-S1 there is very minimal left neuroforaminal stenosis at L5-S1 significant neuroforaminal compromise.  There is facet increase signal on T2 weighted imaging and bilateral L5 and S1 facet joints consistent with synovitis.     All imaging results were reviewed and discussed with patient.      ASSESSMENT:     1. Chronic bilateral low back pain without sciatica      PLAN:   Will trial myofascial physical therapy to work on some of the muscular pain and tension that she experiences after physical activity.    She will follow-up with me in the future at which time we may consider changing treatment strategy which may include either dry needling to the myofascial structures versus consideration of epidural intervention depending on her pain complaints at follow-up. She is ok to use muscle relaxer / NSAID as needed.     Follow-up: After therapy    The patient verbalized understanding with this plan and was in agreement.  There are no barriers to learning.  All questions were answered.    Duration of the service: 12 minutes        Christophe Mixon DO  Physical Medicine and Rehabilitation  Interventional Spine and Sports Medicine   [facility]

## 2024-07-01 ENCOUNTER — TELEPHONE (OUTPATIENT)
Dept: GENETICS | Facility: HOSPITAL | Age: 39
End: 2024-07-01

## 2024-07-01 ENCOUNTER — PATIENT MESSAGE (OUTPATIENT)
Dept: INTERNAL MEDICINE CLINIC | Facility: CLINIC | Age: 39
End: 2024-07-01

## 2024-07-03 ENCOUNTER — MED REC SCAN ONLY (OUTPATIENT)
Dept: PHYSICAL MEDICINE AND REHAB | Facility: CLINIC | Age: 39
End: 2024-07-03

## 2024-07-23 ENCOUNTER — PATIENT MESSAGE (OUTPATIENT)
Dept: PHYSICAL MEDICINE AND REHAB | Facility: CLINIC | Age: 39
End: 2024-07-23

## 2024-07-23 NOTE — TELEPHONE ENCOUNTER
Okay to pause therapy for the next month.  Will continue with home exercises through that time.,  Reasonable to start back on after a month to see if any changes should be made to her home exercise program.

## 2024-07-23 NOTE — TELEPHONE ENCOUNTER
From: Kitty Hamm  To: Christophe Mixon  Sent: 7/23/2024  9:24 AM CDT  Subject: Physical Therapy on pause    Good morning Dr Mixon, I wanted to inform you that I had to put my Physical Therapy on pause as the schedule is interfering with school now and my school length for this semester will be 3 months. I have completed 8 sessions of therapy already. When I spoke with the Therapist she explained that she would pause my case for one month and call me to see how I’m doing as I will still be doing the stretching at home as I have already been executing. She said calls me that she would determine if to pause the treatment longer or end my case.   Also I wanted to give you an update on how I’m doing with therapy. I am having the same results as when I do the stretching at home. My pain starts to feel a little better but as soon as I stop and start to relax and sit for a while when I start to move again it hurts the same all over again.    -Kitty Hamm

## 2024-08-11 ENCOUNTER — PATIENT MESSAGE (OUTPATIENT)
Dept: INTERNAL MEDICINE CLINIC | Facility: CLINIC | Age: 39
End: 2024-08-11

## 2024-08-11 DIAGNOSIS — E11.65 UNCONTROLLED TYPE 2 DIABETES MELLITUS WITH HYPERGLYCEMIA (HCC): Primary | ICD-10-CM

## 2024-08-11 DIAGNOSIS — E66.01 MORBID OBESITY WITH BMI OF 40.0-44.9, ADULT (HCC): ICD-10-CM

## 2024-08-12 NOTE — TELEPHONE ENCOUNTER
[Last office visit on 6/11/24 --> patient was advised to return in 3 months]    Future Appointments   Date Time Provider Department Center   8/30/2024 10:45 AM [id number]   9/24/2024  5:00 PM Deidre Gustafson MD [id number]  [place] [address]     Dr. Gustafson - please review MyChart message and advise

## 2024-08-12 NOTE — TELEPHONE ENCOUNTER
Spoke with patient about increasing dose of Ozempic to 1 mg and keep me posted in 2 weeks with sugars.  Discussed about side effects and use of the mild stick with it i.e. nausea constipation diarrhea etc.

## 2024-09-09 ENCOUNTER — PATIENT MESSAGE (OUTPATIENT)
Dept: INTERNAL MEDICINE CLINIC | Facility: CLINIC | Age: 39
End: 2024-09-09

## 2024-09-09 DIAGNOSIS — E66.01 MORBID OBESITY WITH BMI OF 40.0-44.9, ADULT (HCC): ICD-10-CM

## 2024-09-09 DIAGNOSIS — E11.65 UNCONTROLLED TYPE 2 DIABETES MELLITUS WITH HYPERGLYCEMIA (HCC): Primary | ICD-10-CM

## 2024-09-09 NOTE — TELEPHONE ENCOUNTER
From: Kitty Hamm  To: Deidre Gustafson  Sent: 9/9/2024 6:48 AM CDT  Subject: Ozempic 1mg update    Good morning Dr. Gustafson, yesterday was my last dose of Ozempic 1mg. Overall I feel good, only side effect is my cravings have slowed down a little bit but not by much. No nausea or stomach pains. My blood sugar are doing good but only in the morning they are a little high sometimes like 130s and 140s and other morning they are in the 120s. Before bed my sugars are between 110-90. I have not had anything above 200 or lower than 80. Please let me know if I will continue same dose and please send a refill to my pharmacy thank you.     - Kitty Hamm

## 2024-09-24 ENCOUNTER — LAB ENCOUNTER (OUTPATIENT)
Dept: LAB | Facility: HOSPITAL | Age: 39
End: 2024-09-24
Attending: INTERNAL MEDICINE
Payer: COMMERCIAL

## 2024-09-24 ENCOUNTER — OFFICE VISIT (OUTPATIENT)
Dept: INTERNAL MEDICINE CLINIC | Facility: CLINIC | Age: 39
End: 2024-09-24
Payer: COMMERCIAL

## 2024-09-24 VITALS
BODY MASS INDEX: 42.31 KG/M2 | TEMPERATURE: 98 F | HEART RATE: 69 BPM | OXYGEN SATURATION: 100 % | HEIGHT: 69 IN | SYSTOLIC BLOOD PRESSURE: 101 MMHG | WEIGHT: 285.63 LBS | DIASTOLIC BLOOD PRESSURE: 65 MMHG

## 2024-09-24 DIAGNOSIS — M79.672 FOOT PAIN, LEFT: ICD-10-CM

## 2024-09-24 DIAGNOSIS — E78.00 HYPERCHOLESTEROLEMIA WITHOUT HYPERTRIGLYCERIDEMIA: ICD-10-CM

## 2024-09-24 DIAGNOSIS — E55.9 VITAMIN D DEFICIENCY: ICD-10-CM

## 2024-09-24 DIAGNOSIS — E11.65 UNCONTROLLED TYPE 2 DIABETES MELLITUS WITH HYPERGLYCEMIA (HCC): Primary | ICD-10-CM

## 2024-09-24 DIAGNOSIS — Z71.85 VACCINE COUNSELING: ICD-10-CM

## 2024-09-24 DIAGNOSIS — R31.21 ASYMPTOMATIC MICROSCOPIC HEMATURIA: ICD-10-CM

## 2024-09-24 LAB
ALBUMIN SERPL-MCNC: 4.5 G/DL (ref 3.2–4.8)
ALBUMIN/GLOB SERPL: 1.5 {RATIO} (ref 1–2)
ALP LIVER SERPL-CCNC: 67 U/L
ALT SERPL-CCNC: 19 U/L
ANION GAP SERPL CALC-SCNC: 6 MMOL/L (ref 0–18)
AST SERPL-CCNC: 18 U/L (ref ?–34)
ATRIAL RATE: 71 BPM
BILIRUB SERPL-MCNC: 0.5 MG/DL (ref 0.3–1.2)
BILIRUB UR QL: NEGATIVE
BUN BLD-MCNC: 7 MG/DL (ref 9–23)
BUN/CREAT SERPL: 11.3 (ref 10–20)
CALCIUM BLD-MCNC: 9.3 MG/DL (ref 8.7–10.4)
CHLORIDE SERPL-SCNC: 108 MMOL/L (ref 98–112)
CHOLEST SERPL-MCNC: 146 MG/DL (ref ?–200)
CLARITY UR: CLEAR
CO2 SERPL-SCNC: 26 MMOL/L (ref 21–32)
CREAT BLD-MCNC: 0.62 MG/DL
EGFRCR SERPLBLD CKD-EPI 2021: 116 ML/MIN/1.73M2 (ref 60–?)
FASTING PATIENT LIPID ANSWER: YES
FASTING STATUS PATIENT QL REPORTED: YES
GLOBULIN PLAS-MCNC: 3 G/DL (ref 2–3.5)
GLUCOSE BLD-MCNC: 103 MG/DL (ref 70–99)
GLUCOSE UR-MCNC: NORMAL MG/DL
HDLC SERPL-MCNC: 40 MG/DL (ref 40–59)
KETONES UR-MCNC: NEGATIVE MG/DL
LDLC SERPL CALC-MCNC: 84 MG/DL (ref ?–100)
LEUKOCYTE ESTERASE UR QL STRIP.AUTO: NEGATIVE
NITRITE UR QL STRIP.AUTO: NEGATIVE
NONHDLC SERPL-MCNC: 106 MG/DL (ref ?–130)
OSMOLALITY SERPL CALC.SUM OF ELEC: 288 MOSM/KG (ref 275–295)
P AXIS: 58 DEGREES
P-R INTERVAL: 170 MS
PH UR: 5.5 [PH] (ref 5–8)
POTASSIUM SERPL-SCNC: 3.9 MMOL/L (ref 3.5–5.1)
PROT SERPL-MCNC: 7.5 G/DL (ref 5.7–8.2)
PROT UR-MCNC: NEGATIVE MG/DL
Q-T INTERVAL: 408 MS
QRS DURATION: 100 MS
QTC CALCULATION (BEZET): 443 MS
R AXIS: 10 DEGREES
SODIUM SERPL-SCNC: 140 MMOL/L (ref 136–145)
SP GR UR STRIP: 1.02 (ref 1–1.03)
T AXIS: 22 DEGREES
TRIGL SERPL-MCNC: 124 MG/DL (ref 30–149)
UROBILINOGEN UR STRIP-ACNC: NORMAL
VENTRICULAR RATE: 71 BPM
VIT D+METAB SERPL-MCNC: 22.5 NG/ML (ref 30–100)
VLDLC SERPL CALC-MCNC: 20 MG/DL (ref 0–30)

## 2024-09-24 PROCEDURE — 82306 VITAMIN D 25 HYDROXY: CPT

## 2024-09-24 PROCEDURE — 3078F DIAST BP <80 MM HG: CPT | Performed by: INTERNAL MEDICINE

## 2024-09-24 PROCEDURE — 3074F SYST BP LT 130 MM HG: CPT | Performed by: INTERNAL MEDICINE

## 2024-09-24 PROCEDURE — 93000 ELECTROCARDIOGRAM COMPLETE: CPT | Performed by: INTERNAL MEDICINE

## 2024-09-24 PROCEDURE — 80061 LIPID PANEL: CPT

## 2024-09-24 PROCEDURE — 3008F BODY MASS INDEX DOCD: CPT | Performed by: INTERNAL MEDICINE

## 2024-09-24 PROCEDURE — 80053 COMPREHEN METABOLIC PANEL: CPT

## 2024-09-24 PROCEDURE — 36415 COLL VENOUS BLD VENIPUNCTURE: CPT

## 2024-09-24 PROCEDURE — 99215 OFFICE O/P EST HI 40 MIN: CPT | Performed by: INTERNAL MEDICINE

## 2024-09-24 RX ORDER — ATORVASTATIN CALCIUM 20 MG/1
40 TABLET, FILM COATED ORAL NIGHTLY
Qty: 180 TABLET | Refills: 3 | Status: SHIPPED | OUTPATIENT
Start: 2024-09-24

## 2024-09-24 NOTE — PROGRESS NOTES
HPI:    Patient ID: Kitty Hamm is a 38 year old female.    Chief Complaint   Patient presents with    Follow - Up     Patient states she is here for a 3 month follow up.       Kitty Hamm is a 38 year old female who presents for a pre-operative physical exam. Patient is to have L foot Sx. , to be done by Jostin Anne at [facility] on 10-25-24.    Pt has had previous anesthesia:  Yes.  Previous complications:  No  Chief Complaint   Patient presents with    Follow - Up     Patient states she is here for a 3 month follow up.      Current Outpatient Medications   Medication Sig Dispense Refill    atorvastatin 20 MG Oral Tab Take 2 tablets (40 mg total) by mouth nightly. 180 tablet 3    semaglutide 8 MG/3ML Subcutaneous Solution Pen-injector Inject 2 mg into the skin once a week. 1 each 12    Insulin Pen Needle (PEN NEEDLES) 32G X 4 MM Does not apply Misc 1 each every 7 days. 30 each 0    Lancets Does not apply Misc Dx. E11.65. Checks qam. 100 each 3    Glucose Blood (ONETOUCH VERIO) In Vitro Strip       Blood Glucose Monitoring Suppl w/Device Does not apply Kit Dx. E11.65. Checks qam. 1 kit 0    Blood Gluc Meter Disp-Strips Does not apply Device Dx. E11.65. Checks qam. 50 each 11    Blood Glucose Monitoring Suppl (BLOOD GLUCOSE SYSTEM MEHUL) Does not apply Kit DX E 11.22. 1 kit 0    acetaminophen 500 MG Oral Tab Take 1 tablet (500 mg total) by mouth every 6 (six) hours as needed for Pain.        Allergies: No Known Allergies   Past Medical History:    Bartholin's cyst    Surgical. 2012-Bartholin's abscess, drug therapy    Diabetes (HCC)    High cholesterol    History of Papanicolaou smear of cervix    Hypercholesterolemia without hypertriglyceridemia    IBS (irritable bowel syndrome)    Lipid screening    Microhematuria    Dr. Rajan.     Morbid obesity with BMI of 40.0-44.9, adult ([facility])    Osteoporosis screening    PVC (premature ventricular contraction)    Symptomatic. Sees Dr. Baum at [facility].       Past  Surgical History:   Procedure Laterality Date    Egd  8/11    Other surgical history      Bartholin's cyst surgery    Other surgical history  8/1/2011    Excision of left sebacious cyst      Family History   Problem Relation Age of Onset    Hypertension Mother     Diabetes Father     Hypertension Father     Hypertension Brother     Dementia Maternal Grandmother     Cancer Maternal Grandfather         stomach    Diabetes Paternal Grandmother     Hypertension Paternal Grandmother     Renal Disease Paternal Grandfather     Diabetes Paternal Grandfather     Breast Cancer Maternal Aunt     Colon Cancer Paternal Aunt     Colon Cancer Paternal Aunt     Colon Cancer Paternal Uncle       Social History     Socioeconomic History    Marital status:    Tobacco Use    Smoking status: Never    Smokeless tobacco: Never   Vaping Use    Vaping status: Never Used   Substance and Sexual Activity    Alcohol use: Yes     Comment: Special occassions    Drug use: No    Sexual activity: Yes     Birth control/protection: Injection   Other Topics Concern    Caffeine Concern Yes     Comment: Soda         EXAM:   /65 (BP Location: Right arm, Patient Position: Sitting, Cuff Size: large)   Pulse 69   Temp 98.3 °F (36.8 °C) (Oral)   Ht 5' 9\" (1.753 m)   Wt 285 lb 9.6 oz (129.5 kg)   LMP 04/03/2024 (Approximate)   SpO2 100%   BMI 42.18 kg/m²  Body mass index is 42.18 kg/m².    Recent Labs:   Results for orders placed or performed in visit on 09/24/24   Comp Metabolic Panel (14)   Result Value Ref Range    Glucose 103 (H) 70 - 99 mg/dL    Sodium 140 136 - 145 mmol/L    Potassium 3.9 3.5 - 5.1 mmol/L    Chloride 108 98 - 112 mmol/L    CO2 26.0 21.0 - 32.0 mmol/L    Anion Gap 6 0 - 18 mmol/L    BUN 7 (L) 9 - 23 mg/dL    Creatinine 0.62 0.55 - 1.02 mg/dL    BUN/CREA Ratio 11.3 10.0 - 20.0    Calcium, Total 9.3 8.7 - 10.4 mg/dL    Calculated Osmolality 288 275 - 295 mOsm/kg    eGFR-Cr 116 >=60 mL/min/1.73m2    ALT 19 10 - 49 U/L     AST 18 <34 U/L    Alkaline Phosphatase 67 37 - 98 U/L    Bilirubin, Total 0.5 0.3 - 1.2 mg/dL    Total Protein 7.5 5.7 - 8.2 g/dL    Albumin 4.5 3.2 - 4.8 g/dL    Globulin  3.0 2.0 - 3.5 g/dL    A/G Ratio 1.5 1.0 - 2.0    Patient Fasting for CMP? Yes    Lipid Panel   Result Value Ref Range    Cholesterol, Total 146 <200 mg/dL    HDL Cholesterol 40 40 - 59 mg/dL    Triglycerides 124 30 - 149 mg/dL    LDL Cholesterol 84 <100 mg/dL    VLDL 20 0 - 30 mg/dL    Non HDL Chol 106 <130 mg/dL    Patient Fasting for Lipid? Yes    Vitamin D, 25-Hydroxy   Result Value Ref Range    Vitamin D, 25OH, Total 22.5 (L) 30.0 - 100.0 ng/mL       ASSESSMENT AND PLAN:   Kitty Hamm is a 38 year old female who presents for a pre-operative physical exam.   Perioperative Medical Risk Evaluation    1. Cardiac Risk    EKG shows: Normal sinus rhythm good R wave progression normal axis, no changes from November 30, 2021 EKG.    Per ACC/ AHA guidelines, this patient may proceed to surgery without further risk stratification.    2. Pulmonary risk    3. Heme  No history of bleeding disorder, no evidence of significant anemia  CBC ordered    4. Renal  No hx of renal disease  CMP ordered    Pt may proceed to surgery without recommendation for any further evaluation / risk stratification.  Medical conditions are optimized for surgery and perioperative medication recommendations are outlined above.     Assessment:  Encounter Diagnoses   Name Primary?    Uncontrolled type 2 diabetes mellitus with hyperglycemia (HCC) Yes    Vaccine counseling     Vitamin D deficiency     Hypercholesterolemia without hypertriglyceridemia     Foot pain, left        Plan   Orders:  Orders Placed This Encounter   Procedures    CBC With Differential With Platelet    Prothrombin Time (PT)    PTT, Activated    Urinalysis, Routine     Medications filled today:  Requested Prescriptions     Signed Prescriptions Disp Refills    atorvastatin 20 MG Oral Tab 180 tablet 3     Sig:  Take 2 tablets (40 mg total) by mouth nightly.     Radiology orders:  ELECTROCARDIOGRAM, COMPLETE    No follow-ups on file.     This consult was sent back the referring physician, Dr. Jostin Anthony.          Patient here for follow up of Diabetes.  Has been taking medications regularly.    Checks sugars 1 times daily.  Fasting sugars average 100-120's. No lows. 2 hrs. PP: 90's. BB: 90's.   Watches diabetic diet, low salt.  Diabetic Flow sheet      Latest Ref Rng & Units 9/24/2024     5:02 PM 9/24/2024     8:48 AM 6/12/2024     8:18 AM 6/11/2024     4:41 PM   DIABETIC FLOWSHEET ([facility])   BMI  42.18 kg/m2      Cholesterol Total <200 mg/dL  146      Triglycerides 30 - 149 mg/dL  124      HDL 40 - 59 mg/dL  40      LDL <100 mg/dL  84      Microalbumin Urine mg/dL    0.30    Microalb/Creatinine Calc <=30.0 ug/mg    1.9    Weight (enc vitals)  285 lb 9.6 oz      BP (enc vitals)  101/65      Last Foot Exam    6/11/2024       Last eye exam No sign of diabetic retinopathy.  Checks feet nightly, no open sores     HISTORY OF DIABETES COMPLICATIONS: :  History of Retinopathy: No.   History of Neuropathy: No.   History of Nephropathy: No.      ASSOCIATED COMPLICATIONS:   HTN: Yes.   Hyperlipidemia: Yes.   Coronary Artery Disease:  CAD,  HF, PAD  Cerebrovascular Disease: No.      MEALS:  3 meals a day  Cooks at home    Hypertension  Patient is here for follow up of hypertension. BP at home:   Not check. Has been compliant with medications.  Exercise level: not active (was walking 2 hrs. A day but started L foot pain and limping more thus less active) and has been following low salt diet.  Weight has been stable. Occ. red meat. No fried foods. Once a week. More shrimp. Eats more at home. Less eatting out.   Wt Readings from Last 3 Encounters:   09/24/24 285 lb 9.6 oz (129.5 kg)   06/11/24 286 lb 9.6 oz (130 kg)   04/12/24 280 lb (127 kg)     BP Readings from Last 3 Encounters:   09/24/24 101/65   06/11/24 105/69   04/23/24 132/85      Labs:   Lab Results   Component Value Date/Time     (H) 09/24/2024 08:48 AM     09/24/2024 08:48 AM    K 3.9 09/24/2024 08:48 AM     09/24/2024 08:48 AM    CO2 26.0 09/24/2024 08:48 AM    CREATSERUM 0.62 09/24/2024 08:48 AM    CA 9.3 09/24/2024 08:48 AM    AST 18 09/24/2024 08:48 AM    ALT 19 09/24/2024 08:48 AM    TSH 1.130 10/14/2021 11:32 AM        Lab Results   Component Value Date/Time    CHOLEST 146 09/24/2024 08:48 AM    HDL 40 09/24/2024 08:48 AM    TRIG 124 09/24/2024 08:48 AM    LDL 84 09/24/2024 08:48 AM    NONHDLC 106 09/24/2024 08:48 AM            Wt Readings from Last 3 Encounters:   09/24/24 285 lb 9.6 oz (129.5 kg)   06/11/24 286 lb 9.6 oz (130 kg)   04/12/24 280 lb (127 kg)     BP Readings from Last 3 Encounters:   09/24/24 101/65   06/11/24 105/69   04/23/24 132/85     Labs:   Lab Results   Component Value Date/Time     (H) 09/24/2024 08:48 AM     09/24/2024 08:48 AM    K 3.9 09/24/2024 08:48 AM     09/24/2024 08:48 AM    CO2 26.0 09/24/2024 08:48 AM    CREATSERUM 0.62 09/24/2024 08:48 AM    CA 9.3 09/24/2024 08:48 AM    AST 18 09/24/2024 08:48 AM    ALT 19 09/24/2024 08:48 AM    TSH 1.130 10/14/2021 11:32 AM        Lab Results   Component Value Date/Time    CHOLEST 146 09/24/2024 08:48 AM    HDL 40 09/24/2024 08:48 AM    TRIG 124 09/24/2024 08:48 AM    LDL 84 09/24/2024 08:48 AM    NONHDLC 106 09/24/2024 08:48 AM          Has been dealing with issues with the left foot.  Has had 4 cortisone injections and does not feel outside of raising sugars has really significantly helped.  Finally talked to podiatrist.  Is going to be doing surgery.  Had surgery earlier on right foot and did well for the same issue.  Now wants to do surgery on the left foot.          Review of Systems   Constitutional:  Positive for activity change. Negative for appetite change, chills, diaphoresis, fatigue, fever and unexpected weight change.   HENT:  Negative for congestion, dental  problem, drooling, ear discharge, ear pain, facial swelling, hearing loss, mouth sores, nosebleeds, postnasal drip, rhinorrhea, sinus pressure, sinus pain, sneezing, sore throat, tinnitus, trouble swallowing and voice change.    Eyes:  Negative for photophobia, pain, discharge, redness, itching and visual disturbance.   Respiratory:  Negative for apnea, cough, choking, chest tightness, shortness of breath, wheezing and stridor.    Cardiovascular:  Negative for chest pain, palpitations and leg swelling.   Gastrointestinal:  Negative for abdominal distention, abdominal pain, anal bleeding, blood in stool, constipation, diarrhea, nausea, rectal pain and vomiting.   Endocrine: Negative for cold intolerance, heat intolerance, polydipsia, polyphagia and polyuria.   Genitourinary:  Negative for decreased urine volume, difficulty urinating, dysuria, flank pain, frequency, hematuria, menstrual problem, pelvic pain, urgency, vaginal bleeding, vaginal discharge and vaginal pain.   Musculoskeletal:  Positive for gait problem.   Skin:  Negative for rash.   Neurological:  Negative for dizziness, tremors, seizures, syncope, facial asymmetry, speech difficulty, weakness, light-headedness, numbness and headaches.   Psychiatric/Behavioral:  Negative for agitation, behavioral problems, confusion, decreased concentration, dysphoric mood, hallucinations, self-injury, sleep disturbance and suicidal ideas. The patient is not nervous/anxious and is not hyperactive.    All other systems reviewed and are negative.        Current Outpatient Medications   Medication Sig Dispense Refill    atorvastatin 20 MG Oral Tab Take 2 tablets (40 mg total) by mouth nightly. 180 tablet 3    semaglutide 8 MG/3ML Subcutaneous Solution Pen-injector Inject 2 mg into the skin once a week. 1 each 12    Insulin Pen Needle (PEN NEEDLES) 32G X 4 MM Does not apply Misc 1 each every 7 days. 30 each 0    Lancets Does not apply Misc Dx. E11.65. Checks qam. 100 each 3     Glucose Blood (ONETOUCH VERIO) In Vitro Strip       Blood Glucose Monitoring Suppl w/Device Does not apply Kit Dx. E11.65. Checks qam. 1 kit 0    Blood Gluc Meter Disp-Strips Does not apply Device Dx. E11.65. Checks qam. 50 each 11    Blood Glucose Monitoring Suppl (BLOOD GLUCOSE SYSTEM MEHUL) Does not apply Kit DX E 11.22. 1 kit 0    acetaminophen 500 MG Oral Tab Take 1 tablet (500 mg total) by mouth every 6 (six) hours as needed for Pain.       Allergies:No Known Allergies    HISTORY:  Past Medical History:    Bartholin's cyst    Surgical. 2012-Bartholin's abscess, drug therapy    Diabetes (HCC)    High cholesterol    History of Papanicolaou smear of cervix    Hypercholesterolemia without hypertriglyceridemia    IBS (irritable bowel syndrome)    Lipid screening    Microhematuria    Dr. Rajan.     Morbid obesity with BMI of 40.0-44.9, adult (HCC)    Osteoporosis screening    PVC (premature ventricular contraction)    Symptomatic. Sees Dr. Baum at [facility].       Past Surgical History:   Procedure Laterality Date    Egd  8/11    Other surgical history      Bartholin's cyst surgery    Other surgical history  8/1/2011    Excision of left sebacious cyst      Family History   Problem Relation Age of Onset    Hypertension Mother     Diabetes Father     Hypertension Father     Hypertension Brother     Dementia Maternal Grandmother     Cancer Maternal Grandfather         stomach    Diabetes Paternal Grandmother     Hypertension Paternal Grandmother     Renal Disease Paternal Grandfather     Diabetes Paternal Grandfather     Breast Cancer Maternal Aunt     Colon Cancer Paternal Aunt     Colon Cancer Paternal Aunt     Colon Cancer Paternal Uncle       Social History:   Social History     Socioeconomic History    Marital status:    Tobacco Use    Smoking status: Never    Smokeless tobacco: Never   Vaping Use    Vaping status: Never Used   Substance and Sexual Activity    Alcohol use: Yes     Comment: Special occassions     Drug use: No    Sexual activity: Yes     Birth control/protection: Injection   Other Topics Concern    Caffeine Concern Yes     Comment: Soda     Social Determinants of Health      [facility]    Social Connections        PHYSICAL EXAM:   /65 (BP Location: Right arm, Patient Position: Sitting, Cuff Size: large)   Pulse 69   Temp 98.3 °F (36.8 °C) (Oral)   Ht 5' 9\" (1.753 m)   Wt 285 lb 9.6 oz (129.5 kg)   LMP 04/03/2024 (Approximate)   SpO2 100%   BMI 42.18 kg/m²   BP Readings from Last 3 Encounters:   09/24/24 101/65   06/11/24 105/69   04/23/24 132/85     Wt Readings from Last 3 Encounters:   09/24/24 285 lb 9.6 oz (129.5 kg)   06/11/24 286 lb 9.6 oz (130 kg)   04/12/24 280 lb (127 kg)       Physical Exam  Vitals and nursing note reviewed.   Constitutional:       General: She is not in acute distress.     Appearance: Normal appearance. She is well-developed and well-groomed. She is not ill-appearing, toxic-appearing or diaphoretic.      Interventions: She is not intubated.  HENT:      Head: Normocephalic and atraumatic.      Right Ear: Tympanic membrane, ear canal and external ear normal. No decreased hearing noted. No laceration, drainage, swelling or tenderness. No middle ear effusion. There is no impacted cerumen. No foreign body. No mastoid tenderness. No PE tube. No hemotympanum. Tympanic membrane is not injected, scarred, perforated, erythematous, retracted or bulging. Tympanic membrane has normal mobility.      Left Ear: Tympanic membrane, ear canal and external ear normal. No decreased hearing noted. No laceration, drainage, swelling or tenderness.  No middle ear effusion. There is no impacted cerumen. No foreign body. No mastoid tenderness. No PE tube. No hemotympanum. Tympanic membrane is not injected, scarred, perforated, erythematous, retracted or bulging. Tympanic membrane has normal mobility.      Nose:      Right Sinus:  No maxillary sinus tenderness or frontal sinus tenderness.      Left Sinus: No maxillary sinus tenderness or frontal sinus tenderness.      Mouth/Throat:      Lips: Pink. No lesions.      Mouth: Mucous membranes are moist. No injury, lacerations, oral lesions or angioedema.      Dentition: No dental tenderness, gingival swelling, dental abscesses or gum lesions.      Tongue: No lesions. Tongue does not deviate from midline.      Palate: No mass and lesions.      Pharynx: Oropharynx is clear. Uvula midline. No pharyngeal swelling, oropharyngeal exudate, posterior oropharyngeal erythema or uvula swelling.      Tonsils: No tonsillar exudate or tonsillar abscesses.   Eyes:      General: Lids are normal. Gaze aligned appropriately. No scleral icterus.        Right eye: No foreign body, discharge or hordeolum.         Left eye: No foreign body, discharge or hordeolum.      Extraocular Movements: Extraocular movements intact.      Right eye: Normal extraocular motion and no nystagmus.      Left eye: Normal extraocular motion and no nystagmus.      Conjunctiva/sclera: Conjunctivae normal.      Right eye: Right conjunctiva is not injected. No chemosis, exudate or hemorrhage.     Left eye: Left conjunctiva is not injected. No chemosis, exudate or hemorrhage.     Pupils: Pupils are equal, round, and reactive to light.   Neck:      Thyroid: No thyroid mass, thyromegaly or thyroid tenderness.      Vascular: Normal carotid pulses. No carotid bruit, hepatojugular reflux or JVD.      Trachea: Trachea and phonation normal. No tracheal tenderness, tracheostomy, abnormal tracheal secretions or tracheal deviation.   Cardiovascular:      Rate and Rhythm: Normal rate and regular rhythm.      Pulses: Normal pulses. No decreased pulses.           Carotid pulses are 2+ on the right side and 2+ on the left side.       Radial pulses are 2+ on the right side and 2+ on the left side.        Dorsalis pedis pulses are 2+ on the right side and 2+  on the left side.        Posterior tibial pulses are 2+ on the right side and 2+ on the left side.      Heart sounds: Normal heart sounds, S1 normal and S2 normal.   Pulmonary:      Effort: Pulmonary effort is normal. No tachypnea, bradypnea, accessory muscle usage, prolonged expiration, respiratory distress or retractions. She is not intubated.      Breath sounds: Normal breath sounds and air entry. No stridor, decreased air movement or transmitted upper airway sounds. No decreased breath sounds, wheezing, rhonchi or rales.   Chest:      Chest wall: No tenderness.   Abdominal:      General: Bowel sounds are normal. There is no distension.      Palpations: Abdomen is soft. Abdomen is not rigid. There is no fluid wave, hepatomegaly, splenomegaly or mass.      Tenderness: There is no abdominal tenderness. There is no right CVA tenderness, left CVA tenderness, guarding or rebound.   Musculoskeletal:      Cervical back: Neck supple. No tenderness.      Right lower leg: No edema.      Left lower leg: No edema.   Lymphadenopathy:      Head:      Right side of head: No submental, submandibular, preauricular, posterior auricular or occipital adenopathy.      Left side of head: No submental, submandibular, preauricular, posterior auricular or occipital adenopathy.      Cervical: No cervical adenopathy.      Right cervical: No superficial, deep or posterior cervical adenopathy.     Left cervical: No superficial, deep or posterior cervical adenopathy.      Upper Body:      Right upper body: No supraclavicular adenopathy.      Left upper body: No supraclavicular adenopathy.   Skin:     General: Skin is warm and dry.      Coloration: Skin is not pale.      Findings: No erythema or rash.   Neurological:      Mental Status: She is alert and oriented to person, place, and time.   Psychiatric:         Mood and Affect: Mood normal.         Speech: Speech normal.         Behavior: Behavior normal. Behavior is cooperative.          Thought Content: Thought content normal.              ASSESSMENT/PLAN:     Encounter Diagnoses   Name Primary?    Uncontrolled type 2 diabetes mellitus with hyperglycemia (HCC) Better. Careful with diet and excercise at least 30 minutes 3-4 times a week. Check sugars at different times on different dates. Careful with low sugars. Carry something with you and check sugar if can. Can carry an cracker, etc. Decrease carbohydrates. But also, careful with fruits and natural sugars.One serving a day and no more than 1 handful every day. Check feet  every AM and careful with sores and ulcers on feet bilaterally. Check eyes every year with dilated eye exam.  Check sugars.  2-hour postmeal should be less than 140s.  Pre-meal should be 's.  Both equally affected A1c.  Discussed importance of glycemic control to prevent complications of diabetes  -Discussed complications of diabetes include retinopathy, neuropathy, nephropathy and cardiovascular disease  -Discussed ABCs of DM  -Discussed importance of SBGM  -Discussed importance of low CHO diet, recommend 45gm per meal or 135gm per day  -UTD with optho, check records.   -Normotensive    Yes    Vaccine counseling Recc. Covid booster and flu vaccines. [facility] Separate all vaccines by 2 weeks.        Vitamin D deficiency Stable.        Hypercholesterolemia without hypertriglyceridemia Check blood 12-24.  Increase to 40 mg at night.        Foot pain, left For Sx. Check ECG and blood.       Current Outpatient Medications   Medication Sig Instructions Comments    semaglutide 8 MG/3ML Subcutaneous Solution Pen-injector Inject 2 mg into the skin once a week. Hold 1 week prior.      Insulin Pen Needle (PEN NEEDLES) 32G X 4 MM Does not apply Misc 1 each every 7 days. Take as usual     atorvastatin 20 MG Oral Tab Take 1 tablet (20 mg total) by mouth nightly. Take as usual     Lancets Does not apply Misc Dx. E11.65. Checks qam. Take as usual     Glucose Blood  (ONETOUCH VERIO) In Vitro Strip  Take as usual     Blood Glucose Monitoring Suppl w/Device Does not apply Kit Dx. E11.65. Checks qam. Take as usual     Blood Gluc Meter Disp-Strips Does not apply Device Dx. E11.65. Checks qam. Take as usual     Blood Glucose Monitoring Suppl (BLOOD GLUCOSE SYSTEM MEHUL) Does not apply Kit DX E 11.22. Take as usual     acetaminophen 500 MG Oral Tab Take 1 tablet (500 mg total) by mouth every 6 (six) hours as needed for Pain. Take as usual       No motrin, ibuprofen, advil, alleve, naprosyn at least 10 days prior to surgery. Take all other medications with small sips of water on AM of surgery, unless otherwise directed. Don't eat breakfest that AM. All other medications take with a sip of water even on the morning of surgery.  Hold all over-the-counter natural herbal supplements and vitamins at least a week before surgery.  Anticoagulants: hold plavix for 5 days prior to procedure with cardiology approval, discussed with patient the increased risk of stroke/MI when alterations in therapy occur - patient verbalizes understanding and wishes to proceed. Aspirin 81 mg patient instructed to continue this.     Orders Placed This Encounter   Procedures    CBC With Differential With Platelet    Prothrombin Time (PT)    PTT, Activated    Urinalysis, Routine       Meds This Visit:  Requested Prescriptions     Signed Prescriptions Disp Refills    atorvastatin 20 MG Oral Tab 180 tablet 3     Sig: Take 2 tablets (40 mg total) by mouth nightly.       Imaging & Referrals:  ELECTROCARDIOGRAM, COMPLETE        RTC 6 months for FU.   RTC after 6-11-25 for physical.

## 2024-09-24 NOTE — PATIENT INSTRUCTIONS
ASSESSMENT/PLAN:     Encounter Diagnoses   Name Primary?    Uncontrolled type 2 diabetes mellitus with hyperglycemia (HCC) Better. Careful with diet and excercise at least 30 minutes 3-4 times a week. Check sugars at different times on different dates. Careful with low sugars. Carry something with you and check sugar if can. Can carry an cracker, etc. Decrease carbohydrates. But also, careful with fruits and natural sugars.One serving a day and no more than 1 handful every day. Check feet  every AM and careful with sores and ulcers on feet bilaterally. Check eyes every year with dilated eye exam.  Check sugars.  2-hour postmeal should be less than 140s.  Pre-meal should be 's.  Both equally affected A1c.  Discussed importance of glycemic control to prevent complications of diabetes  -Discussed complications of diabetes include retinopathy, neuropathy, nephropathy and cardiovascular disease  -Discussed ABCs of DM  -Discussed importance of SBGM  -Discussed importance of low CHO diet, recommend 45gm per meal or 135gm per day  -UTD with optho, check records.   -Normotensive    Yes    Vaccine counseling Recc. Covid booster and flu vaccines. Can get at local [facility]. Separate all vaccines by 2 weeks.        Vitamin D deficiency Stable.        Hypercholesterolemia without hypertriglyceridemia Check blood 12-24.  Increase to 40 mg at night.        Foot pain, left For Sx. Check ECG and blood.       Current Outpatient Medications   Medication Sig Instructions Comments    semaglutide 8 MG/3ML Subcutaneous Solution Pen-injector Inject 2 mg into the skin once a week. Hold 1 week prior.      Insulin Pen Needle (PEN NEEDLES) 32G X 4 MM Does not apply Misc 1 each every 7 days. Take as usual     atorvastatin 20 MG Oral Tab Take 1 tablet (20 mg total) by mouth nightly. Take as usual     Lancets Does not apply Misc Dx. E11.65. Checks qam. Take as usual     Glucose Blood (ONETOUCH VERIO) In Vitro Strip  Take as usual      Blood Glucose Monitoring Suppl w/Device Does not apply Kit Dx. E11.65. Checks qam. Take as usual     Blood Gluc Meter Disp-Strips Does not apply Device Dx. E11.65. Checks qam. Take as usual     Blood Glucose Monitoring Suppl (BLOOD GLUCOSE SYSTEM MEHUL) Does not apply Kit DX E 11.22. Take as usual     acetaminophen 500 MG Oral Tab Take 1 tablet (500 mg total) by mouth every 6 (six) hours as needed for Pain. Take as usual       No motrin, ibuprofen, advil, alleve, naprosyn at least 10 days prior to surgery. Take all other medications with small sips of water on AM of surgery, unless otherwise directed. Don't eat breakfest that AM. All other medications take with a sip of water even on the morning of surgery.  Hold all over-the-counter natural herbal supplements and vitamins at least a week before surgery.  Anticoagulants: hold plavix for 5 days prior to procedure with cardiology approval, discussed with patient the increased risk of stroke/MI when alterations in therapy occur - patient verbalizes understanding and wishes to proceed. Aspirin 81 mg patient instructed to continue this.     Orders Placed This Encounter   Procedures    CBC With Differential With Platelet    Prothrombin Time (PT)    PTT, Activated    Urinalysis, Routine       Meds This Visit:  Requested Prescriptions     Signed Prescriptions Disp Refills    atorvastatin 20 MG Oral Tab 180 tablet 3     Sig: Take 2 tablets (40 mg total) by mouth nightly.       Imaging & Referrals:  ELECTROCARDIOGRAM, COMPLETE        RTC 6 months for FU.   RTC after 6-11-25 for physical.

## 2024-09-24 NOTE — PROGRESS NOTES
CMP Normal (electrolytes, kidney and liver functions),   Lipid (choilesterol) is similar to prior.  Goal LDL should be less than 70.  Goal HDL should be above 50.  HDL proved to cardiovascular sustained aerobic exercise at least 30 minutes 3-4 times a week.  Careful with diet.  Avoid red meat, shellfish, pork.  Try not to roach foods.  Lower carb diet.  Exercise is most part at least 30 minutes 3-4 times a week sustained cardiovascular aerobic exercise getting that heart rate going and keeping it going for 30 minutes at a time.  If cannot do 30 will start with 10 minutes of brisk walking is good at each week build up 5 minutes more. Recheck lipid in 3 months.  Orders written.  If not taking atorvastatin to begin.  If taking atorvastatin would increase to 40 mg at night.  Please update med list.  Vitamin D is lower than prior.  Goal is between 30 and 60.  Would add an extra 400 IUs of vitamin D over-the-counter.  Recheck vitamin D level in 3 months.  Orders written.

## 2024-09-26 ENCOUNTER — MED REC SCAN ONLY (OUTPATIENT)
Dept: INTERNAL MEDICINE CLINIC | Facility: CLINIC | Age: 39
End: 2024-09-26

## 2024-09-30 ENCOUNTER — PATIENT MESSAGE (OUTPATIENT)
Dept: INTERNAL MEDICINE CLINIC | Facility: CLINIC | Age: 39
End: 2024-09-30

## 2024-09-30 NOTE — TELEPHONE ENCOUNTER
Dr Gustafson, [facility]  Clinical staff, please assist with scanning form into chart    From: Kitty Hamm  To: Deidre Gustafson  Sent: 9/30/2024  8:50 AM CDT  Subject: Eye exam paperwork    Good morning, I am attaching my eye exam that was completed on 6/24/2024 by Dr. Jayde Galvan, per instructions of Dr. Gustafson, thank you. Please let me know that it was received.

## 2024-10-04 ENCOUNTER — LAB ENCOUNTER (OUTPATIENT)
Dept: LAB | Facility: HOSPITAL | Age: 39
End: 2024-10-04
Attending: INTERNAL MEDICINE
Payer: COMMERCIAL

## 2024-10-04 DIAGNOSIS — E11.65 UNCONTROLLED TYPE 2 DIABETES MELLITUS WITH HYPERGLYCEMIA (HCC): ICD-10-CM

## 2024-10-04 DIAGNOSIS — E78.00 HYPERCHOLESTEROLEMIA WITHOUT HYPERTRIGLYCERIDEMIA: ICD-10-CM

## 2024-10-04 DIAGNOSIS — M79.672 FOOT PAIN, LEFT: ICD-10-CM

## 2024-10-04 DIAGNOSIS — R31.21 ASYMPTOMATIC MICROSCOPIC HEMATURIA: ICD-10-CM

## 2024-10-04 LAB
APTT PPP: 34.9 SECONDS (ref 23–36)
BASOPHILS # BLD AUTO: 0.05 X10(3) UL (ref 0–0.2)
BASOPHILS NFR BLD AUTO: 0.6 %
BILIRUB UR QL: NEGATIVE
CLARITY UR: CLEAR
DEPRECATED RDW RBC AUTO: 46.7 FL (ref 35.1–46.3)
EOSINOPHIL # BLD AUTO: 0.13 X10(3) UL (ref 0–0.7)
EOSINOPHIL NFR BLD AUTO: 1.4 %
ERYTHROCYTE [DISTWIDTH] IN BLOOD BY AUTOMATED COUNT: 14.2 % (ref 11–15)
GLUCOSE UR-MCNC: NORMAL MG/DL
HCT VFR BLD AUTO: 40.9 %
HGB BLD-MCNC: 13.1 G/DL
HGB UR QL STRIP.AUTO: NEGATIVE
IMM GRANULOCYTES # BLD AUTO: 0.1 X10(3) UL (ref 0–1)
IMM GRANULOCYTES NFR BLD: 1.1 %
INR BLD: 1.09 (ref 0.8–1.2)
KETONES UR-MCNC: NEGATIVE MG/DL
LEUKOCYTE ESTERASE UR QL STRIP.AUTO: NEGATIVE
LYMPHOCYTES # BLD AUTO: 2.77 X10(3) UL (ref 1–4)
LYMPHOCYTES NFR BLD AUTO: 30.5 %
MCH RBC QN AUTO: 28.7 PG (ref 26–34)
MCHC RBC AUTO-ENTMCNC: 32 G/DL (ref 31–37)
MCV RBC AUTO: 89.7 FL
MONOCYTES # BLD AUTO: 0.82 X10(3) UL (ref 0.1–1)
MONOCYTES NFR BLD AUTO: 9 %
NEUTROPHILS # BLD AUTO: 5.22 X10 (3) UL (ref 1.5–7.7)
NEUTROPHILS # BLD AUTO: 5.22 X10(3) UL (ref 1.5–7.7)
NEUTROPHILS NFR BLD AUTO: 57.4 %
NITRITE UR QL STRIP.AUTO: NEGATIVE
PH UR: 5.5 [PH] (ref 5–8)
PLATELET # BLD AUTO: 258 10(3)UL (ref 150–450)
PROT UR-MCNC: NEGATIVE MG/DL
PROTHROMBIN TIME: 14.8 SECONDS (ref 11.6–14.8)
RBC # BLD AUTO: 4.56 X10(6)UL
SP GR UR STRIP: 1.02 (ref 1–1.03)
UROBILINOGEN UR STRIP-ACNC: NORMAL
WBC # BLD AUTO: 9.1 X10(3) UL (ref 4–11)

## 2024-10-04 PROCEDURE — 36415 COLL VENOUS BLD VENIPUNCTURE: CPT

## 2024-10-04 PROCEDURE — 85610 PROTHROMBIN TIME: CPT

## 2024-10-04 PROCEDURE — 87086 URINE CULTURE/COLONY COUNT: CPT

## 2024-10-04 PROCEDURE — 85025 COMPLETE CBC W/AUTO DIFF WBC: CPT

## 2024-10-04 PROCEDURE — 81003 URINALYSIS AUTO W/O SCOPE: CPT

## 2024-10-04 PROCEDURE — 85730 THROMBOPLASTIN TIME PARTIAL: CPT

## 2024-10-05 NOTE — PROGRESS NOTES
CBC Normal (white blood cells and red blood cells and platelets),   Urine culture shows no evidence of urinary tract infection.  Urinalysis looks okay.    Clotting factors are normal.  Please send all labs EKG and H&P to surgeon.

## 2024-10-15 ENCOUNTER — PATIENT MESSAGE (OUTPATIENT)
Dept: INTERNAL MEDICINE CLINIC | Facility: CLINIC | Age: 39
End: 2024-10-15

## 2024-12-03 ENCOUNTER — TELEPHONE (OUTPATIENT)
Dept: INTERNAL MEDICINE CLINIC | Facility: CLINIC | Age: 39
End: 2024-12-03

## 2024-12-03 NOTE — TELEPHONE ENCOUNTER
To complete the PA for this pt:    1. Go to  [email or web address] and click \"Enter a Key\"  2. Enter the pts last name, , and key.   Key:[id number]     Pts last name: Hansel      :10/03/1985                Ozempic 2MG/Dose) *MG/#ML   3. Complete the form and click \"Send to Plan\"    Please notify us when you receive the determination from the plan.

## 2024-12-22 ENCOUNTER — TELEPHONE (OUTPATIENT)
Dept: INTERNAL MEDICINE CLINIC | Facility: CLINIC | Age: 39
End: 2024-12-22

## 2024-12-22 DIAGNOSIS — E11.65 UNCONTROLLED TYPE 2 DIABETES MELLITUS WITH HYPERGLYCEMIA (HCC): Primary | ICD-10-CM

## 2024-12-22 NOTE — TELEPHONE ENCOUNTER
Per mychart sent (see letter to patient from plan in mychart)  Patient starts to need PA starting 1/1/25 - She has refills available, checking to see when she is due again for refill so we can be sure she does not run out or have plan secured prior to next fill and future doses.

## 2024-12-23 NOTE — TELEPHONE ENCOUNTER
Letter sent in by patient from [facility] stating  prior auth requied  1/1/25 (Ozempic)   Attachments   [email or web address]

## 2025-01-02 NOTE — TELEPHONE ENCOUNTER
Please inform patient need office visit sooner for follow-up on Ozempic.  Orders written for labs.  She also has labs in the system for vitamin D and lipid panel she can do all of them on same day fasting.

## 2025-01-04 ENCOUNTER — LAB ENCOUNTER (OUTPATIENT)
Dept: LAB | Facility: HOSPITAL | Age: 40
End: 2025-01-04
Attending: INTERNAL MEDICINE
Payer: COMMERCIAL

## 2025-01-04 DIAGNOSIS — E11.65 UNCONTROLLED TYPE 2 DIABETES MELLITUS WITH HYPERGLYCEMIA (HCC): ICD-10-CM

## 2025-01-04 DIAGNOSIS — E78.00 HYPERCHOLESTEROLEMIA WITHOUT HYPERTRIGLYCERIDEMIA: ICD-10-CM

## 2025-01-04 DIAGNOSIS — E55.9 VITAMIN D DEFICIENCY: ICD-10-CM

## 2025-01-04 LAB
ALBUMIN SERPL-MCNC: 4.8 G/DL (ref 3.2–4.8)
ALBUMIN/GLOB SERPL: 1.7 {RATIO} (ref 1–2)
ALP LIVER SERPL-CCNC: 64 U/L
ALT SERPL-CCNC: 19 U/L
ANION GAP SERPL CALC-SCNC: 7 MMOL/L (ref 0–18)
AST SERPL-CCNC: 15 U/L (ref ?–34)
BILIRUB SERPL-MCNC: 0.7 MG/DL (ref 0.3–1.2)
BUN BLD-MCNC: 7 MG/DL (ref 9–23)
BUN/CREAT SERPL: 10.3 (ref 10–20)
CALCIUM BLD-MCNC: 9.6 MG/DL (ref 8.7–10.4)
CHLORIDE SERPL-SCNC: 106 MMOL/L (ref 98–112)
CHOLEST SERPL-MCNC: 133 MG/DL (ref ?–200)
CO2 SERPL-SCNC: 27 MMOL/L (ref 21–32)
CREAT BLD-MCNC: 0.68 MG/DL
EGFRCR SERPLBLD CKD-EPI 2021: 113 ML/MIN/1.73M2 (ref 60–?)
EST. AVERAGE GLUCOSE BLD GHB EST-MCNC: 117 MG/DL (ref 68–126)
FASTING PATIENT LIPID ANSWER: YES
FASTING STATUS PATIENT QL REPORTED: YES
GLOBULIN PLAS-MCNC: 2.9 G/DL (ref 2–3.5)
GLUCOSE BLD-MCNC: 99 MG/DL (ref 70–99)
HBA1C MFR BLD: 5.7 % (ref ?–5.7)
HDLC SERPL-MCNC: 45 MG/DL (ref 40–59)
LDLC SERPL CALC-MCNC: 65 MG/DL (ref ?–100)
NONHDLC SERPL-MCNC: 88 MG/DL (ref ?–130)
OSMOLALITY SERPL CALC.SUM OF ELEC: 288 MOSM/KG (ref 275–295)
POTASSIUM SERPL-SCNC: 3.8 MMOL/L (ref 3.5–5.1)
PROT SERPL-MCNC: 7.7 G/DL (ref 5.7–8.2)
SODIUM SERPL-SCNC: 140 MMOL/L (ref 136–145)
TRIGL SERPL-MCNC: 127 MG/DL (ref 30–149)
VIT D+METAB SERPL-MCNC: 23.9 NG/ML (ref 30–100)
VLDLC SERPL CALC-MCNC: 19 MG/DL (ref 0–30)

## 2025-01-04 PROCEDURE — 80053 COMPREHEN METABOLIC PANEL: CPT

## 2025-01-04 PROCEDURE — 36415 COLL VENOUS BLD VENIPUNCTURE: CPT

## 2025-01-04 PROCEDURE — 83036 HEMOGLOBIN GLYCOSYLATED A1C: CPT

## 2025-01-04 PROCEDURE — 80061 LIPID PANEL: CPT

## 2025-01-04 PROCEDURE — 82306 VITAMIN D 25 HYDROXY: CPT

## 2025-01-06 NOTE — TELEPHONE ENCOUNTER
Approved    Prior authorization approved  Payer: FIOR Nguyen Case ID: [id number]    [phone]    [phone]  Note from payer: Your PA request has been approved.  Additional information will be provided in the approval communication. (Message 2624)  Approval Details    Authorized from December 7, 2024 to January 6, 2026      Patient notified via [employer].

## 2025-01-06 NOTE — PROGRESS NOTES
CMP Normal (electrolytes, sugar, kidney and liver functions),   Lipid (choilesterol) is good,   Vitamin D level is slightly better than prior but still low.  Goal is between 30 and 60.  Vitamin D would add 400 on a Monday Wednesday Friday regimen and recheck vitamin D level in 3 months.  Over-the-counter.  Hemoglobin A1c which is a 3-month average of sugars is much better than prior.  Good job!  Goal is less than 6.5.

## 2025-01-07 ENCOUNTER — TELEPHONE (OUTPATIENT)
Dept: INTERNAL MEDICINE CLINIC | Facility: CLINIC | Age: 40
End: 2025-01-07

## 2025-01-07 NOTE — TELEPHONE ENCOUNTER
To complete the PA for this pt:    1. Go to  [email or web address] and click \"Enter a Key\"  2. Enter the pts last name, , and key.   Key: [id number]    Pts last name:Hansel     : 1985                Ozempic 8MG/3ML  3. Complete the form and click \"Send to Plan\"    Please notify us when you receive the determination from the plan.

## 2025-02-04 ENCOUNTER — PATIENT MESSAGE (OUTPATIENT)
Dept: INTERNAL MEDICINE CLINIC | Facility: CLINIC | Age: 40
End: 2025-02-04

## 2025-02-04 DIAGNOSIS — E11.65 UNCONTROLLED TYPE 2 DIABETES MELLITUS WITH HYPERGLYCEMIA (HCC): ICD-10-CM

## 2025-02-04 DIAGNOSIS — E66.01 MORBID OBESITY WITH BMI OF 40.0-44.9, ADULT (HCC): ICD-10-CM

## 2025-02-05 NOTE — TELEPHONE ENCOUNTER
please see patient Mychart message below. I have pended the medication for your review and approval.

## 2025-03-24 PROBLEM — Z12.31 SCREENING MAMMOGRAM FOR BREAST CANCER: Status: ACTIVE | Noted: 2025-03-24

## 2025-03-24 PROBLEM — M72.2 PLANTAR FASCIAL FIBROMATOSIS: Status: ACTIVE | Noted: 2024-10-12

## 2025-03-25 ENCOUNTER — OFFICE VISIT (OUTPATIENT)
Dept: INTERNAL MEDICINE CLINIC | Facility: CLINIC | Age: 40
End: 2025-03-25

## 2025-03-25 VITALS
BODY MASS INDEX: 39.93 KG/M2 | HEART RATE: 73 BPM | DIASTOLIC BLOOD PRESSURE: 70 MMHG | HEIGHT: 69 IN | OXYGEN SATURATION: 100 % | SYSTOLIC BLOOD PRESSURE: 101 MMHG | WEIGHT: 269.63 LBS | TEMPERATURE: 98 F

## 2025-03-25 DIAGNOSIS — E78.00 HYPERCHOLESTEROLEMIA WITHOUT HYPERTRIGLYCERIDEMIA: ICD-10-CM

## 2025-03-25 DIAGNOSIS — Z12.31 SCREENING MAMMOGRAM FOR BREAST CANCER: ICD-10-CM

## 2025-03-25 DIAGNOSIS — R06.83 SNORING: ICD-10-CM

## 2025-03-25 DIAGNOSIS — E55.9 VITAMIN D DEFICIENCY: ICD-10-CM

## 2025-03-25 DIAGNOSIS — Z71.85 VACCINE COUNSELING: ICD-10-CM

## 2025-03-25 DIAGNOSIS — F34.1 PERSISTENT DEPRESSIVE DISORDER: ICD-10-CM

## 2025-03-25 DIAGNOSIS — E11.65 UNCONTROLLED TYPE 2 DIABETES MELLITUS WITH HYPERGLYCEMIA (HCC): Primary | ICD-10-CM

## 2025-03-25 LAB
CREAT UR-SCNC: 115.9 MG/DL
MICROALBUMIN UR-MCNC: <0.3 MG/DL

## 2025-03-25 PROCEDURE — 3044F HG A1C LEVEL LT 7.0%: CPT | Performed by: INTERNAL MEDICINE

## 2025-03-25 PROCEDURE — 3074F SYST BP LT 130 MM HG: CPT | Performed by: INTERNAL MEDICINE

## 2025-03-25 PROCEDURE — 99215 OFFICE O/P EST HI 40 MIN: CPT | Performed by: INTERNAL MEDICINE

## 2025-03-25 PROCEDURE — 3078F DIAST BP <80 MM HG: CPT | Performed by: INTERNAL MEDICINE

## 2025-03-25 PROCEDURE — 3061F NEG MICROALBUMINURIA REV: CPT | Performed by: INTERNAL MEDICINE

## 2025-03-25 PROCEDURE — 3008F BODY MASS INDEX DOCD: CPT | Performed by: INTERNAL MEDICINE

## 2025-03-25 RX ORDER — ESCITALOPRAM OXALATE 5 MG/1
5 TABLET ORAL EVERY EVENING
Qty: 30 TABLET | Refills: 2 | Status: SHIPPED | OUTPATIENT
Start: 2025-03-25

## 2025-03-25 NOTE — PROGRESS NOTES
HPI:    Patient ID: Kitty Hamm is a 39 year old female.    Chief Complaint   Patient presents with    Follow - Up     Patient states she is here for an 6 month follow up         Patient here for follow up of Diabetes.  Has been taking medications regularly.    Checks sugars 1 times daily. At bedtime : 80's. Get hungry due to low.   Fasting sugars average 100-120's.    Watches diabetic diet, low salt.  Diabetic Flow sheet      Latest Ref Rng & Units 3/25/2025     5:27 PM 3/25/2025     5:25 PM 1/4/2025     9:02 AM 9/30/2024     3:46 PM   DIABETIC FLOWSHEET (EE)   BMI  39.81 kg/m2      Hgb A1c <5.7 %   5.7     Cholesterol Total <200 mg/dL   133     Triglycerides 30 - 149 mg/dL   127     HDL 40 - 59 mg/dL   45     LDL <100 mg/dL   65     HEMOGLOBIN A1c <5.7 %   5.7     Weight (enc vitals)  269 lb 9.6 oz      BP (enc vitals)  101/70      Last Eye Exam     6/24/2024   Eye Doctor Name     Jayde reynark, OD   Eye Exam Retinopathy     No   PHQ2 Score   0        HISTORY OF DIABETES COMPLICATIONS: :  History of Retinopathy: N  History of Neuropathy: N  History of Nephropathy: N     ASSOCIATED COMPLICATIONS:   HTN: Y  Hyperlipidemia: Y  Coronary Artery Disease:  CAD,  HF, PAD  Cerebrovascular Disease: N     MEALS:  2 meals a day, dinner at 5 PM.   Cooks at home    Hypertension  Patient is here for follow up of hypertension. BP at home: not check.   Has been compliant with medications.  Exercise level: trying to do more (walks X 30 minutes at work 2 times a week) and has been following low salt diet.  Weight has been stable. Cooks more. Occ. eats out weekends.   Wt Readings from Last 3 Encounters:   03/25/25 269 lb 9.6 oz (122.3 kg)   09/24/24 285 lb 9.6 oz (129.5 kg)   06/11/24 286 lb 9.6 oz (130 kg)     BP Readings from Last 3 Encounters:   03/25/25 101/70   09/24/24 101/65   06/11/24 105/69     Labs:   Lab Results   Component Value Date/Time    GLU 99 01/04/2025 09:02 AM     01/04/2025 09:02 AM    K 3.8 01/04/2025  09:02 AM     01/04/2025 09:02 AM    CO2 27.0 01/04/2025 09:02 AM    CREATSERUM 0.68 01/04/2025 09:02 AM    CA 9.6 01/04/2025 09:02 AM    AST 15 01/04/2025 09:02 AM    ALT 19 01/04/2025 09:02 AM    TSH 1.130 10/14/2021 11:32 AM        Lab Results   Component Value Date/Time    CHOLEST 133 01/04/2025 09:02 AM    HDL 45 01/04/2025 09:02 AM    TRIG 127 01/04/2025 09:02 AM    LDL 65 01/04/2025 09:02 AM    NONHDLC 88 01/04/2025 09:02 AM            Wt Readings from Last 3 Encounters:   03/25/25 269 lb 9.6 oz (122.3 kg)   09/24/24 285 lb 9.6 oz (129.5 kg)   06/11/24 286 lb 9.6 oz (130 kg)     BP Readings from Last 3 Encounters:   03/25/25 101/70   09/24/24 101/65   06/11/24 105/69     Labs:   Lab Results   Component Value Date/Time    GLU 99 01/04/2025 09:02 AM     01/04/2025 09:02 AM    K 3.8 01/04/2025 09:02 AM     01/04/2025 09:02 AM    CO2 27.0 01/04/2025 09:02 AM    CREATSERUM 0.68 01/04/2025 09:02 AM    CA 9.6 01/04/2025 09:02 AM    AST 15 01/04/2025 09:02 AM    ALT 19 01/04/2025 09:02 AM    TSH 1.130 10/14/2021 11:32 AM        Lab Results   Component Value Date/Time    CHOLEST 133 01/04/2025 09:02 AM    HDL 45 01/04/2025 09:02 AM    TRIG 127 01/04/2025 09:02 AM    LDL 65 01/04/2025 09:02 AM    NONHDLC 88 01/04/2025 09:02 AM          Feeling sad and lonely X several months.   Snoring ( Y)   nocturnal awakenings (  Y)   excessive daytime somnolence ( Y )    difficulty awakening ( Y)   witnessed apneic events   (  N)   unrefreshing sleep ( Y )   Snoring Y  Apnea N   Nocturia N  Dry mouth Y  Headaches Y  Excessive daytime sleepiness Y  Fatigue Y  Sleepiness/drowsiness while driving Y  Insomnia Y  Sleep schedule: Time goes to bed: 830 PM                             Sleep latency: 10 PM falls asleep. Q1 hrs.                              Awakenings:q1 hrs.                              Wake time: 430 AM. Needs white noise.                             Naps: 0    Saw eye doctor because having to use eyedrops  every hour.  Due to irritation eye.  Told dry eyes and given prescription eyedrops.          Review of Systems   Constitutional:  Positive for fatigue. Negative for activity change, appetite change, chills, diaphoresis, fever and unexpected weight change.   Eyes:  Positive for redness. Negative for photophobia, pain, discharge, itching and visual disturbance.   Respiratory:  Negative for apnea, cough, choking, chest tightness, shortness of breath, wheezing and stridor.    Cardiovascular:  Negative for chest pain, palpitations and leg swelling.   Gastrointestinal:  Negative for abdominal distention and abdominal pain.   Endocrine: Negative for cold intolerance, heat intolerance, polydipsia, polyphagia and polyuria.   Neurological:  Negative for dizziness, tremors, seizures, syncope, facial asymmetry, speech difficulty, weakness, light-headedness, numbness and headaches.   Psychiatric/Behavioral:  Positive for agitation, decreased concentration, dysphoric mood and sleep disturbance. Negative for behavioral problems, confusion, hallucinations, self-injury and suicidal ideas. The patient is not nervous/anxious and is not hyperactive.    All other systems reviewed and are negative.        Current Outpatient Medications   Medication Sig Dispense Refill    semaglutide 8 MG/3ML Subcutaneous Solution Pen-injector Inject 2 mg into the skin once a week. 9 mL 3    atorvastatin 20 MG Oral Tab Take 2 tablets (40 mg total) by mouth nightly. 180 tablet 3    Insulin Pen Needle (PEN NEEDLES) 32G X 4 MM Does not apply Misc 1 each every 7 days. 30 each 0    Lancets Does not apply Misc Dx. E11.65. Checks qam. 100 each 3    Glucose Blood (ONETOUCH VERIO) In Vitro Strip       Blood Glucose Monitoring Suppl w/Device Does not apply Kit Dx. E11.65. Checks qam. 1 kit 0    Blood Gluc Meter Disp-Strips Does not apply Device Dx. E11.65. Checks qam. 50 each 11    Blood Glucose Monitoring Suppl (BLOOD GLUCOSE SYSTEM MEHUL) Does not apply Kit DX E  11.22. 1 kit 0    acetaminophen 500 MG Oral Tab Take 1 tablet (500 mg total) by mouth every 6 (six) hours as needed for Pain.       Allergies:Allergies[1]    HISTORY:  Past Medical History:    Bartholin's cyst    Surgical. 2012-Bartholin's abscess, drug therapy    Diabetes (HCC)    High cholesterol    History of Papanicolaou smear of cervix    Hypercholesterolemia without hypertriglyceridemia    IBS (irritable bowel syndrome)    Lipid screening    Microhematuria    Dr. Rajan.     Morbid obesity with BMI of 40.0-44.9, adult (HCC)    Osteoporosis screening    PVC (premature ventricular contraction)    Symptomatic. Sees Dr. Baum at [facility].       Past Surgical History:   Procedure Laterality Date    Egd  8/11    Other surgical history      Bartholin's cyst surgery    Other surgical history  8/1/2011    Excision of left sebacious cyst      Family History   Problem Relation Age of Onset    Hypertension Mother     Diabetes Father     Hypertension Father     Hypertension Brother     Dementia Maternal Grandmother     Cancer Maternal Grandfather         stomach    Diabetes Paternal Grandmother     Hypertension Paternal Grandmother     Renal Disease Paternal Grandfather     Diabetes Paternal Grandfather     Breast Cancer Maternal Aunt     Colon Cancer Paternal Aunt     Colon Cancer Paternal Aunt     Colon Cancer Paternal Uncle       Social History:   Social History     Socioeconomic History    Marital status:    Tobacco Use    Smoking status: Never    Smokeless tobacco: Never   Vaping Use    Vaping status: Never Used   Substance and Sexual Activity    Alcohol use: Yes     Comment: Special occassions    Drug use: No    Sexual activity: Yes     Birth control/protection: Injection   Other Topics Concern    Caffeine Concern Yes     Comment: Soda        PHYSICAL EXAM:   /70 (BP Location: Right arm, Patient Position: Sitting, Cuff Size: large)   Pulse 73   Temp 97.5 °F (36.4 °C) (Temporal)   Ht 5' 9\" (1.753 m)   Wt  269 lb 9.6 oz (122.3 kg)   LMP 04/03/2024 (Approximate)   SpO2 100%   BMI 39.81 kg/m²   BP Readings from Last 3 Encounters:   03/25/25 101/70   09/24/24 101/65   06/11/24 105/69     Wt Readings from Last 3 Encounters:   03/25/25 269 lb 9.6 oz (122.3 kg)   09/24/24 285 lb 9.6 oz (129.5 kg)   06/11/24 286 lb 9.6 oz (130 kg)       Physical Exam  Vitals and nursing note reviewed.   Constitutional:       General: She is not in acute distress.     Appearance: Normal appearance. She is well-developed. She is not ill-appearing, toxic-appearing or diaphoretic.      Interventions: She is not intubated.  Neck:      Thyroid: No thyroid mass or thyromegaly.      Trachea: Trachea and phonation normal.   Cardiovascular:      Rate and Rhythm: Normal rate and regular rhythm.      Pulses: Normal pulses. No decreased pulses.           Carotid pulses are 2+ on the right side and 2+ on the left side.       Radial pulses are 2+ on the right side and 2+ on the left side.        Dorsalis pedis pulses are 2+ on the right side and 2+ on the left side.        Posterior tibial pulses are 2+ on the right side and 2+ on the left side.      Heart sounds: Normal heart sounds, S1 normal and S2 normal.   Pulmonary:      Effort: Pulmonary effort is normal. No tachypnea, bradypnea, accessory muscle usage, prolonged expiration, respiratory distress or retractions. She is not intubated.      Breath sounds: Normal breath sounds and air entry. No stridor, decreased air movement or transmitted upper airway sounds. No decreased breath sounds, wheezing, rhonchi or rales.   Chest:      Chest wall: No tenderness.   Abdominal:      General: There is no distension.      Palpations: Abdomen is soft.      Tenderness: There is no abdominal tenderness.   Musculoskeletal:      Right lower leg: No edema.      Left lower leg: No edema.   Skin:     General: Skin is warm and dry.   Neurological:      Mental Status: She is alert and oriented to person, place, and time.    Psychiatric:         Attention and Perception: She is attentive. She does not perceive auditory or visual hallucinations.         Mood and Affect: Mood is not anxious, depressed or elated. Affect is tearful. Affect is not labile, blunt, flat, angry or inappropriate.         Speech: She is communicative. Speech is not rapid and pressured, delayed, slurred or tangential.         Behavior: Behavior is not agitated, slowed, aggressive, withdrawn, hyperactive or combative. Behavior is cooperative.         Thought Content: Thought content normal. Thought content is not paranoid or delusional. Thought content does not include homicidal or suicidal ideation. Thought content does not include homicidal or suicidal plan.              ASSESSMENT/PLAN:     Encounter Diagnoses   Name Primary?    Uncontrolled type 2 diabetes mellitus with hyperglycemia (HCC) Stable. Check urine. Add more protein and try to keep [id number] 4 bed. Careful with diet and excercise at least 30 minutes 3-4 times a week. Check sugars at different times on different dates. Careful with low sugars. Carry something with you and check sugar if can. Can carry an cracker, etc. Decrease carbohydrates. But also, careful with fruits and natural sugars.One serving a day and no more than 1 handful every day. Check feet  every AM and careful with sores and ulcers on feet bilaterally. Check eyes every year with dilated eye exam.  Check sugars.  2-hour postmeal should be less than 140s.  Pre-meal should be 's.  Both equally affected A1c.  Discussed importance of glycemic control to prevent complications of diabetes  -Discussed complications of diabetes include retinopathy, neuropathy, nephropathy and cardiovascular disease  -Discussed ABCs of DM  -Discussed importance of SBGM  -Discussed importance of low CHO diet, recommend 45gm per meal or 135gm per day  -UTD with optho FU 6-24-25.   -Normotensive    Yes    Vaccine counseling Holding covid booster.         Vitamin D deficiency Check blood after 4-4-25.        Hypercholesterolemia without hypertriglyceridemia Stable.        Screening mammogram for breast cancer Check mammogram. Continue self breast exam every month.        Conjunctivitis.  Warm compresses to eye/s; wipe from inner to out edge  Wash hands frequently as this is very contagious  Do not wear contact lenses for at least three days.  Throw out old contacts and start with new set after at least three days.  Dispose of all eye makeup that was used in past 7 days.  Call if symptoms not improving, worsening, or other questions or concerns.      Depression/insomnia. Check blood. Check sleep study. Counselor. Lexapro 5 mg at night Discussed with patient of side effects and use of these medications.      Orders Placed This Encounter   Procedures    Microalb/Creat Ratio, Random Urine       Meds This Visit:  Requested Prescriptions      No prescriptions requested or ordered in this encounter       Imaging & Referrals:   NAVIGATOR  [facility] SHAYNA 2D+3D SCREENING BILAT (CPT=77067/28371)        Has set mercedes't.          [1] No Known Allergies

## 2025-03-25 NOTE — PATIENT INSTRUCTIONS
ASSESSMENT/PLAN:     Encounter Diagnoses   Name Primary?    Uncontrolled type 2 diabetes mellitus with hyperglycemia (HCC) Stable. Check urine. Add more protein and try to keep [id number] 4 bed. Careful with diet and excercise at least 30 minutes 3-4 times a week. Check sugars at different times on different dates. Careful with low sugars. Carry something with you and check sugar if can. Can carry an cracker, etc. Decrease carbohydrates. But also, careful with fruits and natural sugars.One serving a day and no more than 1 handful every day. Check feet  every AM and careful with sores and ulcers on feet bilaterally. Check eyes every year with dilated eye exam.  Check sugars.  2-hour postmeal should be less than 140s.  Pre-meal should be 's.  Both equally affected A1c.  Discussed importance of glycemic control to prevent complications of diabetes  -Discussed complications of diabetes include retinopathy, neuropathy, nephropathy and cardiovascular disease  -Discussed ABCs of DM  -Discussed importance of SBGM  -Discussed importance of low CHO diet, recommend 45gm per meal or 135gm per day  -UTD with optho FU 6-24-25.   -Normotensive    Yes    Vaccine counseling Holding covid booster.        Vitamin D deficiency Check blood after 4-4-25.        Hypercholesterolemia without hypertriglyceridemia Stable.        Screening mammogram for breast cancer Check mammogram. Continue self breast exam every month.        Conjunctivitis.  Warm compresses to eye/s; wipe from inner to out edge  Wash hands frequently as this is very contagious  Do not wear contact lenses for at least three days.  Throw out old contacts and start with new set after at least three days.  Dispose of all eye makeup that was used in past 7 days.  Call if symptoms not improving, worsening, or other questions or concerns.      Depression/insomnia. Check blood. Check sleep study. Counselor. Lexapro 5 mg at night Discussed with patient of side effects  and use of these medications.      Orders Placed This Encounter   Procedures    Microalb/Creat Ratio, Random Urine       Meds This Visit:  Requested Prescriptions      No prescriptions requested or ordered in this encounter       Imaging & Referrals:   CAPRIATOR  [facility] SHAYNA 2D+3D SCREENING BILAT (CPT=77067/[id number])        Has set mercedes't.

## 2025-03-31 ENCOUNTER — TELEPHONE (OUTPATIENT)
Age: 40
End: 2025-03-31

## 2025-04-11 ENCOUNTER — TELEPHONE (OUTPATIENT)
Age: 40
End: 2025-04-11

## 2025-05-15 ENCOUNTER — HOSPITAL ENCOUNTER (OUTPATIENT)
Dept: MAMMOGRAPHY | Facility: HOSPITAL | Age: 40
Discharge: HOME | End: 2025-05-15
Attending: INTERNAL MEDICINE
Payer: COMMERCIAL

## 2025-05-15 ENCOUNTER — LAB ENCOUNTER (OUTPATIENT)
Dept: LAB | Facility: HOSPITAL | Age: 40
End: 2025-05-15
Attending: INTERNAL MEDICINE
Payer: COMMERCIAL

## 2025-05-15 DIAGNOSIS — F34.1 PERSISTENT DEPRESSIVE DISORDER: ICD-10-CM

## 2025-05-15 DIAGNOSIS — Z12.31 SCREENING MAMMOGRAM FOR BREAST CANCER: ICD-10-CM

## 2025-05-15 DIAGNOSIS — E55.9 VITAMIN D DEFICIENCY: ICD-10-CM

## 2025-05-15 LAB
TSI SER-ACNC: 1.1 UIU/ML (ref 0.55–4.78)
VIT D+METAB SERPL-MCNC: 17.6 NG/ML (ref 30–100)

## 2025-05-15 PROCEDURE — 36415 COLL VENOUS BLD VENIPUNCTURE: CPT

## 2025-05-15 PROCEDURE — 77063 BREAST TOMOSYNTHESIS BI: CPT | Performed by: INTERNAL MEDICINE

## 2025-05-15 PROCEDURE — 82306 VITAMIN D 25 HYDROXY: CPT

## 2025-05-15 PROCEDURE — 77067 SCR MAMMO BI INCL CAD: CPT | Performed by: INTERNAL MEDICINE

## 2025-05-15 PROCEDURE — 84443 ASSAY THYROID STIM HORMONE: CPT

## 2025-06-02 ENCOUNTER — HOSPITAL ENCOUNTER (OUTPATIENT)
Dept: ULTRASOUND IMAGING | Facility: HOSPITAL | Age: 40
Discharge: HOME | End: 2025-06-02
Attending: INTERNAL MEDICINE
Payer: COMMERCIAL

## 2025-06-02 ENCOUNTER — HOSPITAL ENCOUNTER (OUTPATIENT)
Dept: MAMMOGRAPHY | Facility: HOSPITAL | Age: 40
Discharge: HOME | End: 2025-06-02
Attending: INTERNAL MEDICINE
Payer: COMMERCIAL

## 2025-06-02 DIAGNOSIS — R92.8 ABNORMAL MAMMOGRAM: ICD-10-CM

## 2025-06-02 PROCEDURE — 77065 DX MAMMO INCL CAD UNI: CPT | Performed by: INTERNAL MEDICINE

## 2025-06-02 PROCEDURE — 77061 BREAST TOMOSYNTHESIS UNI: CPT | Performed by: INTERNAL MEDICINE

## 2025-06-02 PROCEDURE — 76642 ULTRASOUND BREAST LIMITED: CPT | Performed by: INTERNAL MEDICINE

## 2025-06-07 ENCOUNTER — OFFICE VISIT (OUTPATIENT)
Dept: SLEEP CENTER | Age: 40
End: 2025-06-07
Attending: INTERNAL MEDICINE
Payer: COMMERCIAL

## 2025-06-07 DIAGNOSIS — G47.33 OSA (OBSTRUCTIVE SLEEP APNEA): Primary | ICD-10-CM

## 2025-06-07 PROCEDURE — 95811 POLYSOM 6/>YRS CPAP 4/> PARM: CPT

## 2025-06-10 ENCOUNTER — ORDER TRANSCRIPTION (OUTPATIENT)
Dept: SLEEP CENTER | Age: 40
End: 2025-06-10

## 2025-06-10 DIAGNOSIS — R06.83 SNORING: Primary | ICD-10-CM

## 2025-06-11 PROBLEM — G47.33 OBSTRUCTIVE SLEEP APNEA SYNDROME: Status: ACTIVE | Noted: 2025-06-11

## 2025-06-13 ENCOUNTER — TELEPHONE (OUTPATIENT)
Dept: INTERNAL MEDICINE CLINIC | Facility: CLINIC | Age: 40
End: 2025-06-13

## 2025-06-13 NOTE — TELEPHONE ENCOUNTER
----- Message from Deidre Gustafson sent at 6/11/2025  3:05 PM CDT -----  IMPRESSIONS: This study reveals severe obstructive sleep apnea and was a successful CPAP titration. The RONALD is worse in the supine position. Mild events lingered on the final setting and I recommend   up titration to a final setting of 13 CWP. Severe PLMs w/o arousal were demonstrated.   Diagnosis: Obstructive Sleep Apnea G47.33   RECOMMENDATIONS:   1. It is recommended that the patient should be prescribed CPAP at 13 cm H2O, with humidity at 3 and EPR on. Avoid the supine position.   2. During the titration, the patient was fitted with a [employer] VITARA mask, size LARGE.   3. The patient should avoid alcohol and sedative medications, as these may worsen severity of symptoms.   4. The patient should avoid drowsy driving.   5. Patient to follow up with the referring provider. As the PLMs were s/o arousal, they likely are of little clinical importance.   ----- Message -----  From: Levi Gonzales [facility] In  Sent: 6/11/2025   2:57 PM CDT  To: Deidre Gustafson MD

## 2025-06-17 NOTE — TELEPHONE ENCOUNTER
CPAP Package    CPAP Machine, Generic -     PAP Mask, Fit to Comfort, Generic, Fit Upon Setup, 1 per 3 months - MASK    PAP Headgear, 1 per 6 months -     PAP Humidifier, Heated -     PAP Mask Interface Cushion/Pillow, Fit to Comfort - 2 per month - IFC    Disposable PAP Filter, 2 per 1 month -     Non-Disposable PAP Filter, 1 per 6 months -     PAP Machine Tubing, Fit to Patient Comfort, 1 per 3 months - /    Humidifier Water Chamber, 1 per 6 months -     Quantity  1  Prescription details  Fixed Pressure - 13 cm  Oxygen Usage - None  Supplier  [facility]

## 2025-06-19 ENCOUNTER — TELEPHONE (OUTPATIENT)
Dept: INTERNAL MEDICINE CLINIC | Facility: CLINIC | Age: 40
End: 2025-06-19

## 2025-06-19 ENCOUNTER — OFFICE VISIT (OUTPATIENT)
Dept: INTERNAL MEDICINE CLINIC | Facility: CLINIC | Age: 40
End: 2025-06-19

## 2025-06-19 VITALS
DIASTOLIC BLOOD PRESSURE: 74 MMHG | BODY MASS INDEX: 33.53 KG/M2 | OXYGEN SATURATION: 100 % | HEART RATE: 70 BPM | SYSTOLIC BLOOD PRESSURE: 107 MMHG | HEIGHT: 69 IN | TEMPERATURE: 97 F | WEIGHT: 226.38 LBS

## 2025-06-19 DIAGNOSIS — Z71.85 VACCINE COUNSELING: ICD-10-CM

## 2025-06-19 DIAGNOSIS — E11.65 UNCONTROLLED TYPE 2 DIABETES MELLITUS WITH HYPERGLYCEMIA (HCC): Primary | ICD-10-CM

## 2025-06-19 DIAGNOSIS — Z12.31 SCREENING MAMMOGRAM FOR BREAST CANCER: ICD-10-CM

## 2025-06-19 DIAGNOSIS — R31.21 ASYMPTOMATIC MICROSCOPIC HEMATURIA: ICD-10-CM

## 2025-06-19 DIAGNOSIS — E78.00 HYPERCHOLESTEROLEMIA WITHOUT HYPERTRIGLYCERIDEMIA: ICD-10-CM

## 2025-06-19 DIAGNOSIS — Z00.00 ROUTINE GENERAL MEDICAL EXAMINATION AT A HEALTH CARE FACILITY: ICD-10-CM

## 2025-06-19 DIAGNOSIS — E66.01 MORBID OBESITY WITH BMI OF 40.0-44.9, ADULT (HCC): ICD-10-CM

## 2025-06-19 DIAGNOSIS — G47.33 OBSTRUCTIVE SLEEP APNEA SYNDROME: ICD-10-CM

## 2025-06-19 DIAGNOSIS — R05.1 ACUTE COUGH: ICD-10-CM

## 2025-06-19 DIAGNOSIS — E55.9 VITAMIN D DEFICIENCY: ICD-10-CM

## 2025-06-19 LAB
APPEARANCE: CLEAR
BILIRUBIN: NEGATIVE
GLUCOSE (URINE DIPSTICK): NEGATIVE MG/DL
KETONES (URINE DIPSTICK): NEGATIVE MG/DL
LEUKOCYTES: NEGATIVE
MULTISTIX LOT#: ABNORMAL NUMERIC
NITRITE, URINE: NEGATIVE
PH, URINE: 5.5 (ref 4.5–8)
PROTEIN (URINE DIPSTICK): NEGATIVE MG/DL
SPECIFIC GRAVITY: 1.02 (ref 1–1.03)
URINE-COLOR: YELLOW
UROBILINOGEN,SEMI-QN: 0.2 MG/DL (ref 0–1.9)

## 2025-06-19 PROCEDURE — 81001 URINALYSIS AUTO W/SCOPE: CPT | Performed by: INTERNAL MEDICINE

## 2025-06-19 RX ORDER — LEVALBUTEROL TARTRATE 45 UG/1
1 AEROSOL, METERED ORAL EVERY 8 HOURS PRN
Qty: 15 G | Refills: 0 | Status: SHIPPED | OUTPATIENT
Start: 2025-06-19

## 2025-06-19 RX ORDER — MONTELUKAST SODIUM 10 MG/1
10 TABLET ORAL NIGHTLY
Qty: 7 TABLET | Refills: 0 | Status: SHIPPED | OUTPATIENT
Start: 2025-06-19 | End: 2025-06-26

## 2025-06-19 NOTE — PROGRESS NOTES
-  HPI:     @Patient ID: Kitty Hamm is a 39 year old female.    Kitty Hamm is a 39 year old female who presents for a complete physical exam.   HPI:     Chief Complaint   Patient presents with    Physical     Patient states she is here for an Annual Physical Examination.         Patient's last menstrual period was 05/31/2025 (exact date). Off depo.  X 3 months cycyles were regular.    Symptoms: denies discharge, itching, burning or dysuria, periods are regular, flow is 6 days.     /74 (BP Location: Right arm, Patient Position: Sitting, Cuff Size: large)   Pulse 70   Temp 97.2 °F (36.2 °C) (Temporal)   Ht 5' 9\" (1.753 m)   Wt 226 lb 6.4 oz (102.7 kg)   LMP 05/31/2025 (Exact Date)   SpO2 100%   BMI 33.43 kg/m²    Wt Readings from Last 4 Encounters:   06/19/25 226 lb 6.4 oz (102.7 kg)   03/25/25 269 lb 9.6 oz (122.3 kg)   09/24/24 285 lb 9.6 oz (129.5 kg)   06/11/24 286 lb 9.6 oz (130 kg)     Body mass index is 33.43 kg/m².     Labs:   Lab Results   Component Value Date/Time    WBC 9.1 10/04/2024 03:58 PM    HGB 13.1 10/04/2024 03:58 PM    .0 10/04/2024 03:58 PM      Lab Results   Component Value Date/Time    GLU 99 01/04/2025 09:02 AM     01/04/2025 09:02 AM    K 3.8 01/04/2025 09:02 AM     01/04/2025 09:02 AM    CO2 27.0 01/04/2025 09:02 AM    CREATSERUM 0.68 01/04/2025 09:02 AM    CA 9.6 01/04/2025 09:02 AM    ALB 4.8 01/04/2025 09:02 AM    TP 7.7 01/04/2025 09:02 AM    ALKPHO 64 01/04/2025 09:02 AM    AST 15 01/04/2025 09:02 AM    ALT 19 01/04/2025 09:02 AM    BILT 0.7 01/04/2025 09:02 AM    TSH 1.097 05/15/2025 04:51 PM        Lab Results   Component Value Date/Time    CHOLEST 133 01/04/2025 09:02 AM    HDL 45 01/04/2025 09:02 AM    TRIG 127 01/04/2025 09:02 AM    LDL 65 01/04/2025 09:02 AM    NONHDLC 88 01/04/2025 09:02 AM       Lab Results   Component Value Date/Time    A1C 5.7 (H) 01/04/2025 09:02 AM      Lab Results   Component Value Date    VITD 17.6 (L) 05/15/2025          Health Maintenance   Topic Date Due    Mammogram  06/02/2026       Current Outpatient Medications   Medication Sig Dispense Refill    montelukast (SINGULAIR) 10 MG Oral Tab Take 1 tablet (10 mg total) by mouth nightly for 7 days. 7 tablet 0    Levalbuterol Tartrate 45 MCG/ACT Inhalation Aerosol Inhale 1 puff into the lungs every 8 (eight) hours as needed for Wheezing. 15 g 0    Cholecalciferol (VITAMIN D3) 1.25 MG (59154 UT) Oral Cap 1 po qweek. 4 capsule 4    Cholecalciferol (VITAMIN D3) 1.25 MG (10898 UT) Oral Cap 1 po qweek. 4 capsule 4    escitalopram (LEXAPRO) 10 MG Oral Tab Take 1 tablet (10 mg total) by mouth every morning. 30 tablet 1    hydrOXYzine 10 MG Oral Tab Take 1-2 tablets twice daily as needed for anxiety/sleep 60 tablet 1    semaglutide 8 MG/3ML Subcutaneous Solution Pen-injector Inject 2 mg into the skin once a week. 9 mL 3    atorvastatin 20 MG Oral Tab Take 2 tablets (40 mg total) by mouth nightly. 180 tablet 3    Insulin Pen Needle (PEN NEEDLES) 32G X 4 MM Does not apply Misc 1 each every 7 days. 30 each 0    Lancets Does not apply Misc Dx. E11.65. Checks qam. 100 each 3    Glucose Blood (ONETOUCH VERIO) In Vitro Strip       Blood Glucose Monitoring Suppl w/Device Does not apply Kit Dx. E11.65. Checks qam. 1 kit 0    Blood Gluc Meter Disp-Strips Does not apply Device Dx. E11.65. Checks qam. 50 each 11    Blood Glucose Monitoring Suppl (BLOOD GLUCOSE SYSTEM MEHUL) Does not apply Kit DX E 11.22. 1 kit 0    acetaminophen 500 MG Oral Tab Take 1 tablet (500 mg total) by mouth every 6 (six) hours as needed for Pain.        Past Medical History:    Allergic rhinitis    Amenorrhea    Bartholin's cyst    Surgical. 2012-Bartholin's abscess, drug therapy    Diabetes (HCC)    Dysmenorrhea    Every time i get my full cycle    High cholesterol    History of Papanicolaou smear of cervix    Hypercholesterolemia without hypertriglyceridemia    Hyperlipidemia    IBS (irritable bowel syndrome)    Lipid  screening    Microhematuria    Dr. Rajan.     Morbid obesity with BMI of 40.0-44.9, adult (HCC)    Obesity    Osteoporosis screening    PVC (premature ventricular contraction)    Symptomatic. Sees Dr. Baum at [facility].       Past Surgical History:   Procedure Laterality Date    Egd      Other surgical history      Bartholin's cyst surgery    Other surgical history  2011    Excision of left sebacious cyst      Family History   Problem Relation Age of Onset    Hypertension Mother     Obesity Mother     Diabetes Mother     Diabetes Father     Hypertension Father     Obesity Sister     Hypertension Brother     Obesity Brother     Dementia Maternal Grandmother     Cancer Maternal Grandfather         Stomach cancer    Diabetes Paternal Grandmother     Hypertension Paternal Grandmother     Renal Disease Paternal Grandfather     Diabetes Paternal Grandfather     Breast Cancer Maternal Aunt 50    Colon Cancer Paternal Aunt     Colon Cancer Paternal Aunt     Colon Cancer Paternal Uncle     Ovarian Cancer Neg     Pancreatic Cancer Neg       Social History:  Social History     Socioeconomic History    Marital status:    Tobacco Use    Smoking status: Never    Smokeless tobacco: Never   Vaping Use    Vaping status: Never Used   Substance and Sexual Activity    Alcohol use: Yes     Comment: Special occassions    Drug use: No    Sexual activity: Yes     Birth control/protection: Injection   Other Topics Concern    Caffeine Concern Yes     Comment: Soda           GYNE HISTORY:  OB History    Para Term  AB Living   0 0 0 0 0 0   SAB IAB Ectopic Multiple Live Births   0 0 0 0 0        Hx of Pap: all Paps normal           Patient here for follow up of Diabetes.  Has been taking medications regularly.    Checks sugars 1 times daily. Was driving to [place] and eatting [employer] to keep awake.   Fasting sugars average 73 X 1. Dinner time. Was stressed at time. Now, back to routine X 5-31-25. 110's pre  breakfest and pre bed. [facility]. 2 meals a day. Dinner at 430 PM.   sheet      Latest Ref Rng & Units 6/19/2025    11:19 AM 6/19/2025    11:11 AM 3/25/2025     6:35 PM 3/25/2025     5:27 PM   DIABETIC FLOWSHEET (EE)   BMI   33.43 kg/m2  39.81 kg/m2   Microalbumin Urine mg/dL   <0.30     Microalb/Creatinine Calc    --     Weight (enc vitals)   226 lb 6.4 oz  269 lb 9.6 oz   BP (enc vitals)   107/74  101/70   Last Foot Exam  6/19/2025         Hypertension  Patient is here for follow up of hypertension. BP at home: not check.  has been compliant with medications.  Exercise level: trying to do more (walks at work til 4-25 when got sick) and has not been following low salt diet.  Weight has been stable.  Wt Readings from Last 3 Encounters:   06/19/25 226 lb 6.4 oz (102.7 kg)   03/25/25 269 lb 9.6 oz (122.3 kg)   09/24/24 285 lb 9.6 oz (129.5 kg)     BP Readings from Last 3 Encounters:   06/19/25 107/74   03/25/25 101/70   09/24/24 101/65       Labs:   Lab Results   Component Value Date/Time    GLU 99 01/04/2025 09:02 AM     01/04/2025 09:02 AM    K 3.8 01/04/2025 09:02 AM     01/04/2025 09:02 AM    CO2 27.0 01/04/2025 09:02 AM    CREATSERUM 0.68 01/04/2025 09:02 AM    CA 9.6 01/04/2025 09:02 AM    AST 15 01/04/2025 09:02 AM    ALT 19 01/04/2025 09:02 AM    TSH 1.097 05/15/2025 04:51 PM        Lab Results   Component Value Date/Time    CHOLEST 133 01/04/2025 09:02 AM    HDL 45 01/04/2025 09:02 AM    TRIG 127 01/04/2025 09:02 AM    LDL 65 01/04/2025 09:02 AM    NONHDLC 88 01/04/2025 09:02 AM          Labs:   Lab Results   Component Value Date/Time    GLU 99 01/04/2025 09:02 AM     01/04/2025 09:02 AM    K 3.8 01/04/2025 09:02 AM     01/04/2025 09:02 AM    CO2 27.0 01/04/2025 09:02 AM    CREATSERUM 0.68 01/04/2025 09:02 AM    CA 9.6 01/04/2025 09:02 AM    AST 15 01/04/2025 09:02 AM    ALT 19 01/04/2025 09:02 AM    TSH 1.097 05/15/2025 04:51 PM        Lab Results   Component Value Date/Time    CHOLEST 133  01/04/2025 09:02 AM    HDL 45 01/04/2025 09:02 AM    TRIG 127 01/04/2025 09:02 AM    LDL 65 01/04/2025 09:02 AM    NONHDLC 88 01/04/2025 09:02 AM          Nasal Congestion  This is a new problem. The current episode started in the past 7 days. The problem occurs constantly. The problem has been unchanged. Associated symptoms include coughing (Dry cough.) and myalgias. Pertinent negatives include no abdominal pain, arthralgias, chest pain, chills, congestion, diaphoresis, fatigue, fever, headaches, nausea, neck pain, numbness, rash, sore throat, swollen glands, urinary symptoms, vertigo, visual change, vomiting or weakness. Exacerbated by: lying down at night. She has tried position changes for the symptoms.         Review of Systems   Constitutional:  Negative for activity change, appetite change, chills, diaphoresis, fatigue, fever and unexpected weight change.   HENT:  Negative for congestion, dental problem, drooling, ear discharge, ear pain, facial swelling, hearing loss, mouth sores, nosebleeds, postnasal drip, rhinorrhea, sinus pressure, sinus pain, sneezing, sore throat, tinnitus, trouble swallowing and voice change.    Eyes:  Negative for photophobia, pain, discharge, redness, itching and visual disturbance.   Respiratory:  Positive for cough (Dry cough.) and wheezing. Negative for apnea, choking, chest tightness, shortness of breath and stridor.    Cardiovascular:  Negative for chest pain, palpitations and leg swelling.   Gastrointestinal:  Negative for abdominal distention, abdominal pain, anal bleeding, blood in stool, constipation, diarrhea, nausea, rectal pain and vomiting.   Endocrine: Negative for cold intolerance, heat intolerance, polydipsia, polyphagia and polyuria.   Genitourinary:  Negative for decreased urine volume, difficulty urinating, dysuria, flank pain, frequency, hematuria, menstrual problem, pelvic pain, urgency, vaginal bleeding, vaginal discharge and vaginal pain.   Musculoskeletal:   Positive for myalgias. Negative for arthralgias and neck pain.   Skin:  Negative for rash.   Neurological:  Negative for dizziness, vertigo, tremors, seizures, syncope, facial asymmetry, speech difficulty, weakness, light-headedness, numbness and headaches.   Psychiatric/Behavioral:  Negative for agitation, behavioral problems, confusion, decreased concentration, dysphoric mood, hallucinations, self-injury, sleep disturbance and suicidal ideas. The patient is not nervous/anxious and is not hyperactive.         Claustrophobic. Had panic attack at facility where die RONALD testing. Could not tolerate mask.    All other systems reviewed and are negative.        Current Outpatient Medications   Medication Sig Dispense Refill    montelukast (SINGULAIR) 10 MG Oral Tab Take 1 tablet (10 mg total) by mouth nightly for 7 days. 7 tablet 0    Levalbuterol Tartrate 45 MCG/ACT Inhalation Aerosol Inhale 1 puff into the lungs every 8 (eight) hours as needed for Wheezing. 15 g 0    Cholecalciferol (VITAMIN D3) 1.25 MG (42423 UT) Oral Cap 1 po qweek. 4 capsule 4    Cholecalciferol (VITAMIN D3) 1.25 MG (84173 UT) Oral Cap 1 po qweek. 4 capsule 4    escitalopram (LEXAPRO) 10 MG Oral Tab Take 1 tablet (10 mg total) by mouth every morning. 30 tablet 1    hydrOXYzine 10 MG Oral Tab Take 1-2 tablets twice daily as needed for anxiety/sleep 60 tablet 1    semaglutide 8 MG/3ML Subcutaneous Solution Pen-injector Inject 2 mg into the skin once a week. 9 mL 3    atorvastatin 20 MG Oral Tab Take 2 tablets (40 mg total) by mouth nightly. 180 tablet 3    Insulin Pen Needle (PEN NEEDLES) 32G X 4 MM Does not apply Misc 1 each every 7 days. 30 each 0    Lancets Does not apply Misc Dx. E11.65. Checks qam. 100 each 3    Glucose Blood (ONETOUCH VERIO) In Vitro Strip       Blood Glucose Monitoring Suppl w/Device Does not apply Kit Dx. E11.65. Checks qam. 1 kit 0    Blood Gluc Meter Disp-Strips Does not apply Device Dx. E11.65. Checks qam. 50 each 11     Blood Glucose Monitoring Suppl (BLOOD GLUCOSE SYSTEM MEHUL) Does not apply Kit DX E 11.22. 1 kit 0    acetaminophen 500 MG Oral Tab Take 1 tablet (500 mg total) by mouth every 6 (six) hours as needed for Pain.       Allergies:No Known Allergies    HISTORY:  Past Medical History:    Allergic rhinitis    Amenorrhea    Bartholin's cyst    Surgical. 2012-Bartholin's abscess, drug therapy    Diabetes (HCC)    Dysmenorrhea    Every time i get my full cycle    High cholesterol    History of Papanicolaou smear of cervix    Hypercholesterolemia without hypertriglyceridemia    Hyperlipidemia    IBS (irritable bowel syndrome)    Lipid screening    Microhematuria    Dr. Rajan.     Morbid obesity with BMI of 40.0-44.9, adult (HCC)    Obesity    Osteoporosis screening    PVC (premature ventricular contraction)    Symptomatic. Sees Dr. Baum at [facility].       Past Surgical History:   Procedure Laterality Date    Egd  8/11    Other surgical history      Bartholin's cyst surgery    Other surgical history  8/1/2011    Excision of left sebacious cyst      Family History   Problem Relation Age of Onset    Hypertension Mother     Obesity Mother     Diabetes Mother     Diabetes Father     Hypertension Father     Obesity Sister     Hypertension Brother     Obesity Brother     Dementia Maternal Grandmother     Cancer Maternal Grandfather         Stomach cancer    Diabetes Paternal Grandmother     Hypertension Paternal Grandmother     Renal Disease Paternal Grandfather     Diabetes Paternal Grandfather     Breast Cancer Maternal Aunt 50    Colon Cancer Paternal Aunt     Colon Cancer Paternal Aunt     Colon Cancer Paternal Uncle     Ovarian Cancer Neg     Pancreatic Cancer Neg       Social History:   Social History     Socioeconomic History    Marital status:    Tobacco Use    Smoking status: Never    Smokeless tobacco: Never   Vaping Use    Vaping status: Never Used   Substance and Sexual Activity    Alcohol use: Yes     Comment:  Special occassions    Drug use: No    Sexual activity: Yes     Birth control/protection: Injection   Other Topics Concern    Caffeine Concern Yes     Comment: Soda        PHYSICAL EXAM:   /74 (BP Location: Right arm, Patient Position: Sitting, Cuff Size: large)   Pulse 70   Temp 97.2 °F (36.2 °C) (Temporal)   Ht 5' 9\" (1.753 m)   Wt 226 lb 6.4 oz (102.7 kg)   LMP 05/31/2025 (Exact Date)   SpO2 100%   BMI 33.43 kg/m²   BP Readings from Last 3 Encounters:   06/19/25 107/74   03/25/25 101/70   09/24/24 101/65     Wt Readings from Last 3 Encounters:   06/19/25 226 lb 6.4 oz (102.7 kg)   03/25/25 269 lb 9.6 oz (122.3 kg)   09/24/24 285 lb 9.6 oz (129.5 kg)       Physical Exam  Vitals and nursing note reviewed.   Constitutional:       General: She is not in acute distress.     Appearance: Normal appearance. She is well-developed and well-groomed. She is not ill-appearing, toxic-appearing or diaphoretic.      Interventions: She is not intubated.  HENT:      Head: Normocephalic and atraumatic.      Right Ear: Tympanic membrane, ear canal and external ear normal. No decreased hearing noted. No laceration, drainage, swelling or tenderness. No middle ear effusion. There is no impacted cerumen. No foreign body. No mastoid tenderness. No PE tube. No hemotympanum. Tympanic membrane is not injected, scarred, perforated, erythematous, retracted or bulging. Tympanic membrane has normal mobility.      Left Ear: Tympanic membrane, ear canal and external ear normal. No decreased hearing noted. No laceration, drainage, swelling or tenderness.  No middle ear effusion. There is no impacted cerumen. No foreign body. No mastoid tenderness. No PE tube. No hemotympanum. Tympanic membrane is not injected, scarred, perforated, erythematous, retracted or bulging. Tympanic membrane has normal mobility.      Nose:      Right Sinus: No maxillary sinus tenderness or frontal sinus tenderness.      Left Sinus: No maxillary sinus  tenderness or frontal sinus tenderness.      Mouth/Throat:      Lips: Pink. No lesions.      Mouth: Mucous membranes are moist. No injury, lacerations, oral lesions or angioedema.      Dentition: No dental tenderness, gingival swelling, dental abscesses or gum lesions.      Tongue: No lesions. Tongue does not deviate from midline.      Palate: No mass and lesions.      Pharynx: Oropharynx is clear. Uvula midline. Postnasal drip present. No pharyngeal swelling, oropharyngeal exudate, posterior oropharyngeal erythema or uvula swelling.      Tonsils: No tonsillar exudate or tonsillar abscesses.   Eyes:      General: Lids are normal. Gaze aligned appropriately. No scleral icterus.        Right eye: No foreign body, discharge or hordeolum.         Left eye: No foreign body, discharge or hordeolum.      Extraocular Movements: Extraocular movements intact.      Right eye: Normal extraocular motion and no nystagmus.      Left eye: Normal extraocular motion and no nystagmus.      Conjunctiva/sclera: Conjunctivae normal.      Right eye: Right conjunctiva is not injected. No chemosis, exudate or hemorrhage.     Left eye: Left conjunctiva is not injected. No chemosis, exudate or hemorrhage.     Pupils: Pupils are equal, round, and reactive to light.   Neck:      Thyroid: No thyroid mass, thyromegaly or thyroid tenderness.      Vascular: Normal carotid pulses. No carotid bruit, hepatojugular reflux or JVD.      Trachea: Trachea and phonation normal. No tracheal tenderness, tracheostomy, abnormal tracheal secretions or tracheal deviation.   Cardiovascular:      Rate and Rhythm: Normal rate and regular rhythm.      Pulses: Normal pulses.           Carotid pulses are 2+ on the right side and 2+ on the left side.       Radial pulses are 2+ on the right side and 2+ on the left side.        Dorsalis pedis pulses are 2+ on the right side and 2+ on the left side.        Posterior tibial pulses are 2+ on the right side and 2+ on the left  side.      Heart sounds: Normal heart sounds, S1 normal and S2 normal.   Pulmonary:      Effort: Pulmonary effort is normal. No tachypnea, bradypnea, accessory muscle usage, prolonged expiration, respiratory distress or retractions. She is not intubated.      Breath sounds: Normal breath sounds and air entry. No stridor, decreased air movement or transmitted upper airway sounds. No decreased breath sounds, wheezing, rhonchi or rales.   Chest:      Chest wall: No tenderness.   Breasts:     Breasts are symmetrical.      Right: No swelling, bleeding, inverted nipple, mass, nipple discharge, skin change or tenderness.      Left: No swelling, bleeding, inverted nipple, mass, nipple discharge, skin change or tenderness.   Abdominal:      General: Bowel sounds are normal. There is no distension.      Palpations: Abdomen is soft. Abdomen is not rigid. There is no fluid wave, hepatomegaly, splenomegaly or mass.      Tenderness: There is no abdominal tenderness. There is no right CVA tenderness, left CVA tenderness, guarding or rebound.   Genitourinary:     Exam position: Supine.   Musculoskeletal:      Cervical back: Neck supple. No tenderness.      Right lower leg: No edema.      Left lower leg: No edema.   Lymphadenopathy:      Head:      Right side of head: No submental, submandibular, preauricular, posterior auricular or occipital adenopathy.      Left side of head: No submental, submandibular, preauricular, posterior auricular or occipital adenopathy.      Cervical: No cervical adenopathy.      Right cervical: No superficial, deep or posterior cervical adenopathy.     Left cervical: No superficial, deep or posterior cervical adenopathy.      Upper Body:      Right upper body: No supraclavicular, axillary or pectoral adenopathy.      Left upper body: No supraclavicular, axillary or pectoral adenopathy.   Skin:     General: Skin is warm and dry.      Coloration: Skin is not pale.      Findings: No erythema or rash.    Neurological:      Mental Status: She is alert and oriented to person, place, and time.   Psychiatric:         Mood and Affect: Mood normal.         Speech: Speech normal.         Behavior: Behavior normal. Behavior is cooperative.     Bilateral barefoot skin diabetic exam is normal, visualized feet and the appearance is normal.  Bilateral monofilament sensation of both feet is normal.  Pulsation pedal pulse exam of both lower legs/feet is normal as well.            ASSESSMENT/PLAN:     Encounter Diagnoses   Name Primary?    Uncontrolled type 2 diabetes mellitus with hyperglycemia (HCC) Better. Careful with diet and excercise at least 30 minutes 3-4 times a week. Check sugars at different times on different dates. Careful with low sugars. Carry something with you and check sugar if can. Can carry an cracker, etc. Decrease carbohydrates. But also, careful with fruits and natural sugars.One serving a day and no more than 1 handful every day. Check feet  every AM and careful with sores and ulcers on feet bilaterally. Check eyes every year with dilated eye exam.  Check sugars.  2-hour postmeal should be less than 140s.  Pre-meal should be 's.  Both equally affected A1c.  Discussed importance of glycemic control to prevent complications of diabetes  -Discussed complications of diabetes include retinopathy, neuropathy, nephropathy and cardiovascular disease  -Discussed ABCs of DM  -Discussed importance of SBGM  -Discussed importance of low CHO diet, recommend 45gm per meal or 135gm per day  -Normal foot exam.Wear shoes or sandals in the house at all times.  Good comfortable shoes or sandals that have a strap so stay on the feet.  Check feet every morning watch for signs and symptoms of infection i.e. sores drainage redness pain etc.  If lotion feet, lotion on the top and bottom and not between the toes.   -Follow up with optho  -Normotensive    Yes    Screening mammogram for breast cancer Normal mammogram.  Continue self breast exam every month.         Morbid obesity with BMI of 40.0-44.9, adult (HCC)       Routine general medical examination at a health care facility Check urine.        Vaccine counseling Up to date.        Asymptomatic microscopic hematuria Check urine.        Obstructive sleep apnea syndrome Not able to tolerate.  Follow-up with sleep medicine.       Vitamin D deficiency Check blood 8-25.        Hypercholesterolemia without hypertriglyceridemia Stable.        Acute cough DW pt. of options. Most colds are viral, Can try Mucinex or Robitussin if very thick secretions, if watery, can try dayquil or nyquil which will help the drainage.  Steam also helps and plenty of rest.  Hold resp. panel. Use albuterol every 3 hrs. For 2 days and as needed and then, as needed.  Can try singular 10 mg at night for 7 nights.  Discussed with patient of side effects and use of these medications.          Orders Placed This Encounter   Procedures    Hemoglobin A1C    URINALYSIS, AUTO, W/O SCOPE    Urinalysis with Culture Reflex       Meds This Visit:  Requested Prescriptions     Signed Prescriptions Disp Refills    montelukast (SINGULAIR) 10 MG Oral Tab 7 tablet 0     Sig: Take 1 tablet (10 mg total) by mouth nightly for 7 days.    Levalbuterol Tartrate 45 MCG/ACT Inhalation Aerosol 15 g 0     Sig: Inhale 1 puff into the lungs every 8 (eight) hours as needed for Wheezing.       Imaging & Referrals:  PULMONARY - INTERNAL      RTC 6 months for FU.

## 2025-06-19 NOTE — TELEPHONE ENCOUNTER
To complete the PA for this pt:    1. Go to  [email or web address] and click \"Enter a Key\"  2. Enter the pts last name, , and key.   Key:[id number]     Pts last name: Hansel    :1985             Levabuterol Tartrate 45MCG/ACT Aerosol   3. Complete the form and click \"Send to Plan\"    Please notify us when you receive the determination from the plan.

## 2025-06-19 NOTE — TELEPHONE ENCOUNTER
Prior Authorization History  Levalbuterol Tartrate 45 MCG/ACT Inhalation Aerosol     History    View all authorizations for this medication  Closed   6/19/2025  5:53 PM  Close reason: Prior Authorization not required for patient/medication   Note from payer: Your PA has been resolved, no additional PA is required.  For further inquiries please contact the number on the back of the member prescription card. (Message 1000) - Prescriber details have been updated to match the prescriber directory.   Payer: [facility]    [phone]

## 2025-06-19 NOTE — PATIENT INSTRUCTIONS
ASSESSMENT/PLAN:     Encounter Diagnoses   Name Primary?    Uncontrolled type 2 diabetes mellitus with hyperglycemia (HCC) Better. Careful with diet and excercise at least 30 minutes 3-4 times a week. Check sugars at different times on different dates. Careful with low sugars. Carry something with you and check sugar if can. Can carry an cracker, etc. Decrease carbohydrates. But also, careful with fruits and natural sugars.One serving a day and no more than 1 handful every day. Check feet  every AM and careful with sores and ulcers on feet bilaterally. Check eyes every year with dilated eye exam.  Check sugars.  2-hour postmeal should be less than 140s.  Pre-meal should be 's.  Both equally affected A1c.  Discussed importance of glycemic control to prevent complications of diabetes  -Discussed complications of diabetes include retinopathy, neuropathy, nephropathy and cardiovascular disease  -Discussed ABCs of DM  -Discussed importance of SBGM  -Discussed importance of low CHO diet, recommend 45gm per meal or 135gm per day  -Normal foot exam. Wear shoes or sandals in the house at all times.  Good comfortable shoes or sandals that have a strap so stay on the feet.  Check feet every morning watch for signs and symptoms of infection i.e. sores drainage redness pain etc.  If lotion feet, lotion on the top and bottom and not between the toes.   -Follow up with optho  -Normotensive    Yes    Screening mammogram for breast cancer Normal mammogram. Continue self breast exam every month.         Morbid obesity with BMI of 40.0-44.9, adult (HCC)       Routine general medical examination at a health care facility Check urine.        Vaccine counseling Up to date.        Asymptomatic microscopic hematuria Check urine.        Obstructive sleep apnea syndrome Not able to tolerate.  Follow-up with sleep medicine.       Vitamin D deficiency Check blood 8-25.        Hypercholesterolemia without hypertriglyceridemia Stable.         Acute cough DW pt. of options. Most colds are viral, Can try Mucinex or Robitussin if very thick secretions, if watery, can try dayquil or nyquil which will help the drainage.  Steam also helps and plenty of rest.  Hold resp. panel. Use albuterol every 3 hrs. For 2 days and as needed and then, as needed. Can try singular 10 mg at night for 7 nights.  Discussed with patient of side effects and use of these medications.          Orders Placed This Encounter   Procedures    Hemoglobin A1C    URINALYSIS, AUTO, W/O SCOPE       Meds This Visit:  Requested Prescriptions     Signed Prescriptions Disp Refills    montelukast (SINGULAIR) 10 MG Oral Tab 7 tablet 0     Sig: Take 1 tablet (10 mg total) by mouth nightly for 7 days.    Levalbuterol Tartrate 45 MCG/ACT Inhalation Aerosol 15 g 0     Sig: Inhale 1 puff into the lungs every 8 (eight) hours as needed for Wheezing.       Imaging & Referrals:  PULMONARY - INTERNAL      RTC 6 months for FU.

## 2025-06-20 ENCOUNTER — PATIENT MESSAGE (OUTPATIENT)
Dept: INTERNAL MEDICINE CLINIC | Facility: CLINIC | Age: 40
End: 2025-06-20

## 2025-06-20 LAB
BILIRUB UR QL: NEGATIVE
CLARITY UR: CLEAR
GLUCOSE UR-MCNC: NORMAL MG/DL
HYALINE CASTS #/AREA URNS AUTO: PRESENT /LPF
KETONES UR-MCNC: NEGATIVE MG/DL
LEUKOCYTE ESTERASE UR QL STRIP.AUTO: NEGATIVE
NITRITE UR QL STRIP.AUTO: NEGATIVE
PH UR: 5.5 [PH] (ref 5–8)
PROT UR-MCNC: NEGATIVE MG/DL
SP GR UR STRIP: 1.02 (ref 1–1.03)
UROBILINOGEN UR STRIP-ACNC: NORMAL

## (undated) NOTE — ED AVS SNAPSHOT
Terrence Lackey   MRN: [id number]    Department:  [facility] Emergency Department   Date of Visit:  9/18/2019           Disclosure     Insurance plans vary and the physician(s) referred by the ER may not be covered by your plan.  Please contact yo CARE PHYSICIAN AT ONCE OR RETURN IMMEDIATELY TO THE EMERGENCY DEPARTMENT. If you have been prescribed any medication(s), please fill your prescription right away and begin taking the medication(s) as directed.   If you believe that any of the medications

## (undated) NOTE — ED AVS SNAPSHOT
Denver Spear   MRN: [id number]    Department:  [facility] Emergency Department   Date of Visit:  4/21/2018           Disclosure     Insurance plans vary and the physician(s) referred by the ER may not be covered by your plan.  Please contact yo CARE PHYSICIAN AT ONCE OR RETURN IMMEDIATELY TO THE EMERGENCY DEPARTMENT. If you have been prescribed any medication(s), please fill your prescription right away and begin taking the medication(s) as directed.   If you believe that any of the medications

## (undated) NOTE — MR AVS SNAPSHOT
After Visit Summary   10/14/2021    Andi Paget   MRN: [id number]           Visit Information     Date & Time  10/14/2021 11:00 AM Provider  Audrey Reyes MD [address] [place] [address], [address] [place] [address],[address], [facility]  Phone  [phone] y CUSTOM]     VITAMIN D [[id number] CUSTOM]     Future Labs/Procedures Expected by Expires    URINALYSIS, ROUTINE [[id number] CUSTOM]  10/14/2021 10/14/2022    URINE CULTURE, ROUTINE [[id number] CUSTOM]  10/14/2021 (Approximate) 10/14/2022    VITAMIN D [[id number] CUSTO using your web-cam enabled computer or mobile device wherever you are. Video Visits cost $50 and can be paid hassle-free using a credit, debit, or health savings card. Not active on [employer] Ask us how to get signed up today!         If you receive a surve

## (undated) NOTE — LETTER
7/3/2019              Kitty Hamm        [address] [facility] [place]         Dear Kirt Willams,    It was a pleasure to see you. Your recent pap test with HPV cotesting was normal.  There is no need for further testing at this time.   I look forward t

## (undated) NOTE — LETTER
To Whom It May Concern: This certifies that Mecca Munroe is a patient under my care. Please excuse her from work today and tomorrow (9/19/2019 and 9/20/2019 as patient was recently seen in the Emergency Room.  If you have any questions, please do not hesi

## (undated) NOTE — MR AVS SNAPSHOT
[facility]  [address] [facility] [address]  [phone]               Thank you for choosing us for your health care visit with Dilan Maza MD.  We are glad to serve you and happy to provide you with this summary of your hot water) to the area for 20 minutes at a time. Do this 3 to 4 times a day. · Do not cut, squeeze, or pop the boil yourself. · Apply antibiotic cream or ointment to the skin 3 to 4 times a day, unless something else was prescribed.  Some ointments includ Take 1 tablet (20 mg total) by mouth 4 (four) times daily before meals and nightly. Prn abdominal pain   Commonly known as:  BENTYL           ibuprofen 600 MG Tabs   Take  by mouth.    Commonly known as:  MOTRIN           metoprolol Tartrate 25 MG Tabs   Ta

## (undated) NOTE — MR AVS SNAPSHOT
[facility]  [address] [place] [address] [phone]               Thank you for choosing us for your health care visit with Nurse. We are glad to serve you and happy to provide you with this summary of your visit.   Please help u Results of Recent Testing     URINE PREGNANCY TEST      Component Value Standard Range & Units    Pregnancy Test, Urine negative     Control Line Present with a clear background (yes/no) present Yes/No    Kit Lot # [id number] Numeric    Kit Expiration Da

## (undated) NOTE — LETTER
AUTHORIZATION FOR SURGICAL OPERATION OR OTHER PROCEDURE    1. I hereby authorize Dr. Fountain, and [facility] staff assigned to my case to perform the following operation and/or procedure at the [facility]:    __________________________Cortisone injection left heel___________________________      _______________________________________________________________________________________________    2.  My physician has explained the nature and purpose of the operation or other procedure, possible alternative methods of treatment, the risks involved, and the possibility of complication to me.  I acknowledge that no guarantee has been made as to the result that may be obtained.  3.  I recognize that, during the course of this operation, or other procedure, unforseen conditions may necessitate additional or different procedure than those listed above.  I, therefore, further authorize and request that the above named physician, his/her physician assistants or designees perform such procedures as are, in his/her professional opinion, necessary and desirable.  4.  Any tissue or organs removed in the operation or other procedure may be disposed of by and at the discretion of the [facility] and [facility].  5.  I understand that in the event of a medical emergency, I will be transported by local paramedics to [facility] or other hospital emergency department.  6.  I certify that I have read and fully understand the above consent to operation and/or other procedure.    7.  I acknowledge that my physician has explained sedation/analgesia administration to me including the risks and benefits.  I consent to the administration of sedation/analgesia as may be necessary or desirable in the judgement of my physician.    Witness signature: ___________________________________________________ Date:  ______/______/_____                    Time:  ________ A.M.  P.M.       Patient  Name:  ______________________________________________________  (please print)      Patient signature:  ___________________________________________________             Relationship to Patient:           []  Parent    Responsible person                          []  Spouse  In case of minor or                    [] Other  _____________   Incompetent name:  __________________________________________________                               (please print)      _____________      Responsible person  In case of minor or  Incompetent signature:  _______________________________________________    Statement of Physician  My signature below affirms that prior to the time of the procedure, I have explained to the patient and/or his/her guardian, the risks and benefits involved in the proposed treatment and any reasonable alternative to the proposed treatment.  I have also explained the risks and benefits involved in the refusal of the proposed treatment and have answered the patient's questions.                        Date:  ______/______/_______  Provider                      Signature:  __________________________________________________________       Time:  ___________ A.M    P.M.

## (undated) NOTE — Clinical Note
Thanks for the referralWill restart phentermine at 30 mg. She did well on it in the past. I did order a new ekg for her. She also drinks a lot of soda from [employer]. topiramate should help curve her cravings. hctz for some edema.  She can take this PRNThanks

## (undated) NOTE — MR AVS SNAPSHOT
[facility]  [address] [place] [address] [phone]               Thank you for choosing us for your health care visit with Efren Quinn.  Baby MD Manpreet.  We are glad to serve you and happy to provide you with this summary of your visit Take 25 mg by mouth as needed (Patient to take if she is feeling heart palpitations, per patient, and prescribed by cardiologist.).    Commonly known as:  LOPRESSOR                Where to Get Your Medications      These medications were sent to [address]

## (undated) NOTE — Clinical Note
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 Please excuse Iris for 1 days. She may return to work on 6/5/2017.       If you require additional information please

## (undated) NOTE — LETTER
AUTHORIZATION FOR SURGICAL OPERATION OR OTHER PROCEDURE    1. I hereby authorize Dr. Alan Chu, and [facility] staff assigned to my case to perform the following operation and/or procedure at the [facility]:    _______________________________________________________________________________________________    Left heel cortisone injection  _______________________________________________________________________________________________    2. My physician has explained the nature and purpose of the operation or other procedure, possible alternative methods of treatment, the risks involved, and the possibility of complication to me. I acknowledge that no guarantee has been made as to the result that may be obtained. 3.  I recognize that, during the course of this operation, or other procedure, unforseen conditions may necessitate additional or different procedure than those listed above. I, therefore, further authorize and request that the above named physician, his/her physician assistants or designees perform such procedures as are, in his/her professional opinion, necessary and desirable. 4.  Any tissue or organs removed in the operation or other procedure may be disposed of by and at the discretion of the [facility] and [facility]. 5.  I understand that in the event of a medical emergency, I will be transported by local paramedics to [facility] or other hospital emergency department. 6.  I certify that I have read and fully understand the above consent to operation and/or other procedure. 7.  I acknowledge that my physician has explained sedation/analgesia administration to me including the risks and benefits. I consent to the administration of sedation/analgesia as may be necessary or desirable in the judgement of my physician.     Witness signature: ___________________________________________________ Date:  ______/______/_____ Time:  ________ A. M.  P.M. Patient Name:  ______________________________________________________  (please print)      Patient signature:  ___________________________________________________             Relationship to Patient:           []  Parent    Responsible person                          []  Spouse  In case of minor or                    [] Other  _____________   Incompetent name:  __________________________________________________                               (please print)      _____________      Responsible person  In case of minor or  Incompetent signature:  _______________________________________________    Statement of Physician  My signature below affirms that prior to the time of the procedure, I have explained to the patient and/or his/her guardian, the risks and benefits involved in the proposed treatment and any reasonable alternative to the proposed treatment. I have also explained the risks and benefits involved in the refusal of the proposed treatment and have answered the patient's questions.                         Date:  ______/______/_______  Provider                      Signature:  __________________________________________________________       Time:  ___________ A.M    P.M.

## (undated) NOTE — LETTER
[address] [facility]  [address]  [place] [facility] [id number] [phone]            March 18, 2020    Patient: Alcus Soulier   YOB: 1985   Date of Visit: 3/18/2020       Dear Employer,    At [address] [place] [address]

## (undated) NOTE — LETTER
6/1/2020          To Whom It May Concern:    Blu Kaba is currently under my medical care and may not return to work at this time. Please excuse Iris for 7 days. She may return to work on 6-8-20. Activity is restricted as follows: none.     If you